# Patient Record
Sex: FEMALE | Race: OTHER | HISPANIC OR LATINO | Employment: FULL TIME | URBAN - METROPOLITAN AREA
[De-identification: names, ages, dates, MRNs, and addresses within clinical notes are randomized per-mention and may not be internally consistent; named-entity substitution may affect disease eponyms.]

---

## 2018-05-02 ENCOUNTER — HOSPITAL ENCOUNTER (EMERGENCY)
Facility: HOSPITAL | Age: 28
Discharge: HOME | End: 2018-05-02
Attending: EMERGENCY MEDICINE | Admitting: EMERGENCY MEDICINE

## 2018-05-02 VITALS
OXYGEN SATURATION: 100 % | TEMPERATURE: 98.5 F | DIASTOLIC BLOOD PRESSURE: 85 MMHG | HEART RATE: 69 BPM | RESPIRATION RATE: 16 BRPM | WEIGHT: 176 LBS | SYSTOLIC BLOOD PRESSURE: 124 MMHG

## 2018-05-02 DIAGNOSIS — R05.9 COUGH: Primary | ICD-10-CM

## 2018-05-02 PROCEDURE — 99283 EMERGENCY DEPT VISIT LOW MDM: CPT

## 2018-05-02 RX ORDER — BENZONATATE 100 MG/1
100 CAPSULE ORAL EVERY 8 HOURS
Qty: 24 CAPSULE | Refills: 0 | Status: SHIPPED | OUTPATIENT
Start: 2018-05-02 | End: 2018-09-22 | Stop reason: ALTCHOICE

## 2018-05-02 RX ORDER — ALBUTEROL SULFATE 90 UG/1
2 AEROSOL, METERED RESPIRATORY (INHALATION) EVERY 6 HOURS PRN
Qty: 8 G | Refills: 0 | Status: SHIPPED | OUTPATIENT
Start: 2018-05-02 | End: 2018-09-22 | Stop reason: ALTCHOICE

## 2018-05-02 NOTE — DISCHARGE INSTRUCTIONS
Tos aguda   LO QUE NECESITA SABER:   Chen Lessen tos aguda puede durar hasta 3 semanas  Las causas comunes de radha tos aguda incluyen un resfriado, alergias o radha infección pulmonar  INSTRUCCIONES SOBRE EL CAMPOS HOSPITALARIA:   Regrese a la mary de emergencias si:   · Tiene dificultad para respirar o le falta el aire  · Usted tose john o nota john en ricketts mucosidad  · Se desmaya, se siente débil o mareado  · Le duele el pecho cuando respira hondo o tose  · Tiene respiración silbante  Pregúntele a ricketts [place]it [place] vitaminas y minerales son adecuados para usted  · Usted tiene fiebre  · La tos dura más de 4 semanas  · Andreina síntomas no mejoran con el tratamiento  · Usted tiene preguntas o inquietudes acerca de ricketts condición o cuidado  Medicamentos:   · Medicamentos,  para detener la tos, disminuir la inflamación de las vías respiratorias o ayudar a abrirlas  Los medicamentos también pueden despejar las vías respiratorias  Pregunte a ricketts médico qué medicamentos de venta hiram puede ayala  Si tiene radha infección causada por bacterias, es posible que necesite antibióticos  · [place] andreina medicamentos olimpia se le haya indicado  Consulte con ricketts médico si usted rl que ricketts medicamento no le está ayudando o si presenta efectos secundarios  Infórmele si es alérgico a cualquier medicamento  Mantenga radha lista actualizada de los [place], las vitaminas y los productos herbales que ana  Incluya los siguientes datos de los medicamentos: cantidad, frecuencia y motivo de administración  Traiga con usted la lista o los envases de la píldoras a andreina citas de seguimiento  Lleve la lista de los medicamentos con usted en he de radha emergencia  El manejo de ricketts síntomas:   · No fume y permanezca lejos de otras personas que fuman  La nicotina y otros químicos contenidos en los cigarrillos y puros pueden causar daño pulmonar y empeorar ricketts tos   Pida información a ricketts médico si usted actualmente fuma y [place] para dejar de fumar  Los cigarrillos electrónicos o tabaco sin humo todavía contienen nicotina  Consulte con smith médico antes de [employer]  · Benjamin líquidos adicionales olimpia se le indique  Los líquidos le ayudarán a aflojar y disolver la mucosidad para que la pueda expectorar  Los líquidos ayudarán a evitar la deshidratación  Los mejores líquidos para ayala son el agua, el jugo y el caldo  No tome líquidos que contienen cafeína  La cafeína puede aumentar el riesgo de deshidratación  Pregúntele a smith médico cuál es la cantidad de líquido que necesita ingerir a diario  · Repose según le indicaron  No realice actividades que empeoren la tos, olimpia el ejercicio físico      · Use un humidificador o vaporizador  Use un humidificador de tk frío o un vaporizador para elevar la humedad en smith casa  [place] podría ayudarle a respirar más fácilmente y al mismo tiempo disminuir la tos  · Ingiera de 2 a 5 ml de [employer]h [employer] al día  La miel puede ayudar a diluir los mocos y [employer] tos  · Utilice gotas o pastillas para la tos  Estas pueden ayudar a disminuir la irritación de la garganta y la tos  Acuda a leoncio consultas de control con smith médico según le indicaron  Anote leoncio preguntas para que se acuerde de hacerlas jeremy leoncio visitas  © 2017 [address] [employer] Information is for End User's use only and may not be sold, redistributed or otherwise used for commercial purposes  All illustrations and images included in CareNotes® are the copyrighted property of A D A M , Inc  or Gonsalo Moraes  Esta información es sólo para uso en educación  Smith intención no es darle un consejo médico sobre enfermedades o tratamientos  Colsulte con smith Jorge So farmacéutico antes de seguir cualquier régimen médico para saber si es seguro y efectivo para usted  DISCHARGE INSTRUCTIONS:    FOLLOW UP WITH YOUR PRIMARY CARE PROVIDER OR THE [address] [place] [address]   MAKE AN APPOINTMENT TO BE SEEN  TAKE TESSALON AS PRESCRIBED FOR COUGH  IF RASH, SHORTNESS OF BREATH OR TROUBLE SWALLOWING, STOP TAKING THE MEDICATION AND BE SEEN  USE ALBUTEROL INHALER AS PRESCRIBED  IF RASH, SHORTNESS OF BREATH OR TROUBLE SWALLOWING, STOP TAKING THE MEDICATION AND BE SEEN  REST AND DRINK PLENTY OF FLUIDS  IF SYMPTOMS WORSEN OR NEW SYMPTOMS ARISE, RETURN TO THE ER TO BE SEEN

## 2018-05-02 NOTE — ED PROVIDER NOTES
History  Chief Complaint   Patient presents with    Asthma     SOB- asthma for 3 days  has no inhaler     27y  o female with PMH of asthma presents to the ER for cough for 3 days  She denies taking any medication for symptoms  She describes her cough as dry  Symptoms are constant  She has positive sick contacts with similar symptoms but denies recent travel  She denies fever, chills, rhinorrhea/congestion, sore throat, chest pain, dyspnea, N/V/D, abdominal pain, weakness or paresthesias  History provided by:  Patient   used: Yes ([employer] [id number])        None       History reviewed  No pertinent past medical history  History reviewed  No pertinent surgical history  History reviewed  No pertinent family history  I have reviewed and agree with the history as documented  Social History   Substance Use Topics    Smoking status: Never Smoker    Smokeless tobacco: Never Used    Alcohol use No        Review of Systems   Constitutional: Negative for activity change, appetite change, chills and fever  HENT: Negative for congestion, drooling, ear discharge, ear pain, facial swelling, rhinorrhea and sore throat  Eyes: Negative for redness  Respiratory: Positive for cough  Negative for chest tightness and shortness of breath  Cardiovascular: Negative for chest pain  Gastrointestinal: Negative for abdominal pain, diarrhea, nausea and vomiting  Musculoskeletal: Negative for neck stiffness  Skin: Negative for rash  Allergic/Immunologic: Negative for food allergies  Neurological: Negative for weakness and numbness         Physical Exam  ED Triage Vitals [05/02/18 1232]   Temperature Pulse Respirations Blood Pressure SpO2   98 5 °F (36 9 °C) 69 16 124/85 100 %      Temp Source Heart Rate Source Patient Position - Orthostatic VS BP Location FiO2 (%)   Temporal -- -- -- --      Pain Score       No Pain           Orthostatic Vital Signs  Vitals:    05/02/18 1232   BP: 124/85 Pulse: 69       Physical Exam   Constitutional:  Non-toxic appearance  No distress  HENT:   Head: Normocephalic and atraumatic  Right Ear: Tympanic membrane, external ear and ear canal normal  No drainage, swelling or tenderness  No foreign bodies  Tympanic membrane is not erythematous  No hemotympanum  Left Ear: Tympanic membrane, external ear and ear canal normal  No drainage, swelling or tenderness  No foreign bodies  Tympanic membrane is not erythematous  No hemotympanum  Nose: Nose normal    Mouth/Throat: Uvula is midline, oropharynx is clear and moist and mucous membranes are normal  No uvula swelling  No posterior oropharyngeal edema, posterior oropharyngeal erythema or tonsillar abscesses  No tonsillar exudate  Neck: Normal range of motion and phonation normal  Neck supple  No tracheal deviation present  Cardiovascular: Normal rate, regular rhythm, S1 normal, S2 normal and normal heart sounds  Exam reveals no gallop and no friction rub  No murmur heard  Pulmonary/Chest: Effort normal and breath sounds normal  No respiratory distress  She has no decreased breath sounds  She has no wheezes  She has no rhonchi  She has no rales  She exhibits no tenderness  Neurological: She is alert  GCS eye subscore is 4  GCS verbal subscore is 5  GCS motor subscore is 6  Skin: Skin is warm and dry  No rash noted  Psychiatric: She has a normal mood and affect  Nursing note and vitals reviewed  ED Medications  Medications - No data to display    Diagnostic Studies  Results Reviewed     None                 No orders to display              Procedures  Procedures       Phone Contacts  ED Phone Contact    ED Course                               MDM  Number of Diagnoses or Management Options  Cough: new and does not require workup  Diagnosis management comments: DDX consists of but not limited to: viral syndrome, asthma, pneumonia    Without fever and normal lung sounds, will hold off on CXR       At discharge, I instructed the patient to:  -follow up with pcp  -take Tessalon as prescribed for cough  -use Albuterol inhaler as prescribed  -rest and drink plenty of fluids  -return to the ER if symptoms worsened or new symptoms arose  Patient agreed to this plan and was stable at time of discharge  Patient Progress  Patient progress: stable    CritCare Time    Disposition  Final diagnoses:   Cough     Time reflects when diagnosis was documented in both MDM as applicable and the Disposition within this note     Time User Action Codes Description Comment    5/2/2018 12:48 PM Erna Sheridan [[id number]] Cough       ED Disposition     ED Disposition Condition Comment    Discharge  Jhon Chavez discharge to home/self care  Condition at discharge: Stable        Follow-up Information     Follow up With Specialties Details Why [facility] Medicine Schedule an appointment as soon as possible for a visit As needed [address] [place] [address]  [id number] [phone]        Patient's Medications   Discharge Prescriptions    ALBUTEROL (PROVENTIL HFA,VENTOLIN HFA) 90 MCG/ACT INHALER    Inhale 2 puffs every 6 (six) hours as needed for wheezing or shortness of breath       Start Date: 5/2/2018  End Date: --       Order Dose: 2 puffs       Quantity: 8 g    Refills: 0    BENZONATATE (TESSALON PERLES) 100 MG CAPSULE    Take 1 capsule (100 mg total) by mouth every 8 (eight) hours       Start Date: 5/2/2018  End Date: --       Order Dose: 100 mg       Quantity: 24 capsule    Refills: 0     No discharge procedures on file      ED Provider  Electronically Signed by           Julia Wilburn PA-C  05/02/18 4438

## 2018-09-21 ENCOUNTER — HOSPITAL ENCOUNTER (EMERGENCY)
Facility: HOSPITAL | Age: 28
Discharge: HOME | End: 2018-09-22
Admitting: EMERGENCY MEDICINE
Payer: OTHER MISCELLANEOUS

## 2018-09-21 VITALS
RESPIRATION RATE: 16 BRPM | OXYGEN SATURATION: 100 % | DIASTOLIC BLOOD PRESSURE: 85 MMHG | HEART RATE: 77 BPM | SYSTOLIC BLOOD PRESSURE: 132 MMHG | TEMPERATURE: 98.2 F

## 2018-09-21 DIAGNOSIS — S82.402A LEFT FIBULAR FRACTURE: Primary | ICD-10-CM

## 2018-09-21 PROCEDURE — 99283 EMERGENCY DEPT VISIT LOW MDM: CPT

## 2018-09-21 RX ORDER — IBUPROFEN 600 MG/1
600 TABLET ORAL ONCE
Status: COMPLETED | OUTPATIENT
Start: 2018-09-22 | End: 2018-09-22

## 2018-09-22 ENCOUNTER — APPOINTMENT (EMERGENCY)
Dept: RADIOLOGY | Facility: HOSPITAL | Age: 28
End: 2018-09-22
Payer: OTHER MISCELLANEOUS

## 2018-09-22 PROCEDURE — 73610 X-RAY EXAM OF ANKLE: CPT

## 2018-09-22 RX ORDER — OXYCODONE HYDROCHLORIDE AND ACETAMINOPHEN 5; 325 MG/1; MG/1
1 TABLET ORAL EVERY 4 HOURS PRN
Qty: 10 TABLET | Refills: 0 | Status: SHIPPED | OUTPATIENT
Start: 2018-09-22

## 2018-09-22 RX ADMIN — IBUPROFEN 600 MG: 600 TABLET, FILM COATED ORAL at 00:00

## 2018-09-22 NOTE — ED PROVIDER NOTES
History  Chief Complaint   Patient presents with    Ankle Pain     Pt reports " I feel at work " c/o left ankle pain, ankle noted to be swollen  Pt denies hitting head and denies LOC      28y  o female with no significant PMH presents to the ER for left ankle pain for 2 hours after falling at work  She denies taking any medication for pain  She describes her pain as sharp and non-radiating  She rates her pain 10/10 and states it is constant  She denies hitting her head or LOC  She denies fever, chills, chest pain, dyspnea, N/V/D, abdominal pain, weakness or paresthesias  History provided by:  Patient   used: No        None       History reviewed  No pertinent past medical history  History reviewed  No pertinent surgical history  History reviewed  No pertinent family history  I have reviewed and agree with the history as documented  Social History   Substance Use Topics    Smoking status: Never Smoker    Smokeless tobacco: Never Used    Alcohol use No        Review of Systems   Constitutional: Negative for chills and fever  Eyes: Negative for redness  Respiratory: Negative for shortness of breath  Cardiovascular: Negative for chest pain  Gastrointestinal: Negative for abdominal pain, diarrhea, nausea and vomiting  Musculoskeletal: Positive for joint swelling (left ankle)  Negative for neck stiffness  Skin: Negative for rash  Allergic/Immunologic: Negative for food allergies  Neurological: Negative for weakness and numbness  Physical Exam  Physical Exam   Constitutional:  Non-toxic appearance  No distress  HENT:   Head: Normocephalic and atraumatic  Right Ear: Tympanic membrane, external ear and ear canal normal  No drainage, swelling or tenderness  No foreign bodies  Tympanic membrane is not erythematous  No hemotympanum  Left Ear: Tympanic membrane, external ear and ear canal normal  No drainage, swelling or tenderness  No foreign bodies  Tympanic membrane is not erythematous  No hemotympanum  Nose: Nose normal    Mouth/Throat: Uvula is midline, oropharynx is clear and moist and mucous membranes are normal  No uvula swelling  No posterior oropharyngeal edema, posterior oropharyngeal erythema or tonsillar abscesses  No tonsillar exudate  Neck: Normal range of motion and phonation normal  Neck supple  No tracheal deviation present  Cardiovascular: Normal rate, regular rhythm, S1 normal, S2 normal and normal heart sounds  Exam reveals no gallop and no friction rub  No murmur heard  Pulmonary/Chest: Effort normal and breath sounds normal  No respiratory distress  She has no decreased breath sounds  She has no wheezes  She has no rhonchi  She has no rales  She exhibits no tenderness  Musculoskeletal:        Left knee: Normal         Left ankle: She exhibits decreased range of motion and swelling  She exhibits no ecchymosis, no deformity, no laceration and normal pulse  Tenderness  Lateral malleolus tenderness found  Left lower leg: Normal         Left foot: Normal         Feet:    Neurological: She is alert  GCS eye subscore is 4  GCS verbal subscore is 5  GCS motor subscore is 6  Skin: Skin is warm and dry  No rash noted  Psychiatric: She has a normal mood and affect  Nursing note and vitals reviewed        Vital Signs  ED Triage Vitals [09/21/18 2354]   Temperature Pulse Respirations Blood Pressure SpO2   98 2 °F (36 8 °C) 77 16 132/85 100 %      Temp Source Heart Rate Source Patient Position - Orthostatic VS BP Location FiO2 (%)   Temporal Monitor Sitting Right arm --      Pain Score       Worst Possible Pain           Vitals:    09/21/18 2354   BP: 132/85   Pulse: 77   Patient Position - Orthostatic VS: Sitting       Visual Acuity      ED Medications  Medications   ibuprofen (MOTRIN) tablet 600 mg (600 mg Oral Given 9/22/18 0000)       Diagnostic Studies  Results Reviewed     None                 XR ankle 3+ views LEFT   ED Interpretation by Feliciano Fish PA-C (09/22 6579)   Distal fibular fracture seen by me at this time  Procedures  Orthopedic Injury  Date/Time: 9/22/2018 12:59 AM  Performed by: Viki Lua by: Fausto Matt     Patient Location:  ED  Other Assisting Provider: Yes (comment) (ED technician)    Verbal consent obtained?: Yes    Consent given by:  Patient  Patient states understanding of procedure being performed: Yes    Radiology Images displayed and confirmed  If images not available, report reviewed: Yes    Patient identity confirmed:  Arm band  Injury location:  Ankle  Location details:  Left ankle  Injury type:  Fracture  Fracture type: lateral malleolus    Fracture type: lateral malleolus    Neurovascular status: Neurovascularly intact    Distal perfusion: normal    Neurological function: normal    Range of motion: reduced    Local anesthesia used?: No    General anesthesia used?: No    Manipulation performed?: No    Immobilization:  Splint and crutches  Splint type:  Short leg  Supplies used:  Cotton padding, elastic bandage and Ortho-Glass  Neurovascular status: Neurovascularly intact    Distal perfusion: normal    Neurological function: normal    Range of motion: unchanged    Patient tolerance:  Patient tolerated the procedure well with no immediate complications           Phone Contacts  ED Phone Contact    ED Course                               MDM  Number of Diagnoses or Management Options  Left fibular fracture: new and requires workup  Diagnosis management comments: DDX consists of but not limited to: fracture, contusion, dislocation, sprain    Will xray the ankle  Fracture of the distal fibula seen by me at this time  Will place in posterior short leg splint      At discharge, I instructed the patient to:  -follow up with pcp  -take Tylenol or Motrin for mild pain  -take Percocet as prescribed for moderate pain  -follow up with the recommended orthopedic specialist  -rest, ice and elevate the ankle  -wear the splint until seen by orthopedics  -return to the ER if symptoms worsened or new symptoms arose  Patient agreed to this plan and was stable at time of discharge  Amount and/or Complexity of Data Reviewed  Tests in the radiology section of CPT®: ordered and reviewed  Independent visualization of images, tracings, or specimens: yes    Patient Progress  Patient progress: stable    CritCare Time    Disposition  Final diagnoses:   Left fibular fracture     Time reflects when diagnosis was documented in both MDM as applicable and the Disposition within this note     Time User Action Codes Description Comment    9/22/2018 12:54 AM Daija RICARDO Add [[id number]] Left fibular fracture       ED Disposition     ED Disposition Condition Comment    Discharge  Satira Lobe discharge to home/self care  Condition at discharge: Stable        Follow-up Information     Follow up With Specialties Details Why [address] [facility] Schedule an appointment as soon as possible for a visit in 1 day left fibula fracture [address] [facility] [place] [address] [id number] [phone]          Patient's Medications   Discharge Prescriptions    OXYCODONE-ACETAMINOPHEN (PERCOCET) 5-325 MG PER TABLET    Take 1 tablet by mouth every 4 (four) hours as needed for moderate pain Max Daily Amount: 6 tablets       Start Date: 9/22/2018 End Date: --       Order Dose: 1 tablet       Quantity: 10 tablet    Refills: 0     No discharge procedures on file      ED Provider  Electronically Signed by           Edmond Marcum PA-C  09/22/18 0110

## 2018-09-22 NOTE — DISCHARGE INSTRUCTIONS
Fractura de pierna   LO QUE NECESITA SABER:   Radha fractura de pierna es radha fractura en cualquiera de los 3 huesos largos de la pierna  El fémur es el hueso más teddy y va de ricketts cadera a la rodilla  Luly Mote y tibia son los 2 huesos en la parte inferior de ricketts pierna que Vika misti de ricketts rodilla al tobillo  INSTRUCCIONES SOBRE EL CAMPOS HOSPITALARIA:   Regrese a la mary de emergencias si:   · Ricketts pierna se siente cálida, sensible y adolorida  Se podría natasha inflamado y escobedo  · El dolor que siente en ricketts pierna lesionada empeora aún después de descansar y ayala medicamentos  · El yeso se moja o se daña  · El pie o los dedos del pie están entumecidos  · La piel en los dedos de la pierna lesionada se enrojece, está fría, o [place]  Comuníquese con ricketts médico o ricketts especialista en huesos si:   · Usted tiene fiebre  · El yeso o la abrazadera están muy apretados  · Hay manchas nuevas de john o un mal olor que sale de por debajo del yeso  · Usted tiene dificultad para  ricketts pierna o la dificultad que ya tenía empeora  · Usted tiene preguntas o inquietudes acerca de ricketts condición o cuidado  Medicamentos:   · Un medicamento con receta para el dolor  podrían ser Sherle Hedge  Pregunte cómo ayala estos medicamentos de radha forma vaughan  · AINEs (Analgésicos antiinflamatorios no esteroides) olimpia el ibuprofeno, ayudan a disminuir la inflamación, el dolor y la [place]  Los AINEs pueden causar sangrado estomacal o problemas renales en ciertas personas  Si usted ana un medicamento anticoagulante, siempre pregúntele a ricketts médico si los GATITO son seguros para usted  Siempre fior la etiqueta de lilly medicamento y [employer] instrucciones  · Acetaminofeno:  patsy el dolor y baja la fiebre  Está disponible sin receta médica  Pregunte la cantidad y la frecuencia con que debe tomarlos  [facility]   Fior las etiquetas de todos los demás medicamentos que esté usando para saber si también contienen acetaminofén, o pregunte a ricketts médico o farmacéutico  El acetaminofén puede causar daño en el hígado cuando no se ana de forma correcta  No use más de 4 gramos (4000 miligramos) en total de acetaminofeno en un día  · [place] leoncio medicamentos olimpia se le haya indicado  Consulte con ricketts médico si usted rl que ricketts medicamento no le está ayudando o si presenta efectos secundarios  Infórmele si es alérgico a algún medicamento  Mantenga radha lista actualizada de los [place], las vitaminas y los productos herbales que ana  Incluya los siguientes datos de los medicamentos: cantidad, frecuencia y motivo de administración  Traiga con usted la lista o los envases de la píldoras a leoncio citas de seguimiento  Lleve la lista de los medicamentos con usted en he de radha emergencia  Cuidado del yeso o la férula:   · Fíjese todos los días si la piel alrededor del yeso o la férula está enrojecida o [place]  · No use objetos afilados o punzantes para rascarse la piel debajo del yeso o la férula  · No se quite la férula a menos que ricketts médico lo autorice  Bañarse con un yeso o radha férula:  No permita que el yeso o férula se moje  Antes de bañarse, cubra el yeso o la férula con radha bolsa plástica  Pegue la bolsa a ricketts piel encima de la férula o yeso para sellar el agua  Mantenga la pierna fuera del agua en he de fugas en la bolsa  Pregunte cuándo puede ayala un baño o [place]  Cuidados personales:   · Descanse  la pierna olimpia se le indique y evite actividades que causen dolor en las piernas  · Aplique hielo  en la pierna de 15 a 20 minutos cada hora o olimpia se le indique  Use un paquete de hielo o ponga hielo molido dentro de [employer]  Cúbrala con radha toalla  El hielo ayuda a evitar daño al tejido y a disminuir la inflamación y el dolor  · Eleve  ricketts pierna por encima del nivel de ricketts corazón con la mayor frecuencia posible  [place] va a disminuir inflamación y el dolor   Coloque ricketts pierna sobre almohadas o cobijas para mantenerla elevada cómodamente  · Use muletas o un andador  según las indicaciones  Las [employer] ayudarán a caminar y quitar un poco de peso de la pierna lesionada hasta que sane  · Fisioterapia  podría recomendarse  Un fisioterapeuta le puede enseñar ejercicios para ayudarle a mejorar el movimiento y la fuerza, y para disminuir el dolor  Acuda a leoncio consultas de control con smith médico o especialista en huesos según le indicaron:  Es posible que deba regresar para que le quiten la férula o el yeso  Es posible que necesite radha radiografía de la pierna para comprobar qué tan berna el hueso bull sanado  Anote leoncio preguntas para que se acuerde de hacerlas jeremy leoncio visitas  © 2017 [facility] [facility] Information is for End User's use only and may not be sold, redistributed or otherwise used for commercial purposes  All illustrations and images included in CareNotes® are the copyrighted property of A D A M , Inc  or Gonsalo Moraes  Esta información es sólo para uso en educación  Smith intención no es darle un consejo médico sobre enfermedades o tratamientos  Colsulte con smith [place]ith [place] farmacéutico antes de seguir cualquier régimen médico para saber si es seguro y efectivo para usted  DISCHARGE INSTRUCTIONS:    FOLLOW UP WITH YOUR PRIMARY CARE PROVIDER OR THE [address] [place] [address]  MAKE AN APPOINTMENT TO BE SEEN  TAKE TYLENOL OR MOTRIN FOR MILD PAIN  TAKE PERCOCET AS PRESCRIBED FOR MODERATE PAIN  IF RASH, SHORTNESS OF BREATH OR TROUBLE SWALLOWING, STOP TAKING THE MEDICATION AND BE SEEN  NO DRINKING, DRIVING OR OPERATING HEAVY MACHINERY WHILE TAKING THIS MEDICATION  FOLLOW UP WITH THE RECOMMENDED ORTHOPEDIC SPECIALIST  MAKE AN APPOINTMENT TO BE SEEN  REST, ICE AND ELEVATE THE AREA  WEAR THE SPLINT UNTIL SEEN BY THE RECOMMENDED ORTHOPEDIC SPECIALIST  DO NOT GET THE SPLINT WET  DO NOT TAKE THE SPLINT OFF      IF SYMPTOMS WORSEN OR NEW SYMPTOMS ARISE, RETURN TO THE ER TO BE SEEN

## 2020-05-15 ENCOUNTER — APPOINTMENT (EMERGENCY)
Dept: RADIOLOGY | Facility: HOSPITAL | Age: 30
End: 2020-05-15
Payer: COMMERCIAL

## 2020-05-15 ENCOUNTER — HOSPITAL ENCOUNTER (EMERGENCY)
Facility: HOSPITAL | Age: 30
Discharge: HOME | End: 2020-05-15
Attending: EMERGENCY MEDICINE | Admitting: EMERGENCY MEDICINE
Payer: COMMERCIAL

## 2020-05-15 VITALS
WEIGHT: 199.3 LBS | TEMPERATURE: 97.7 F | DIASTOLIC BLOOD PRESSURE: 94 MMHG | SYSTOLIC BLOOD PRESSURE: 143 MMHG | HEART RATE: 73 BPM | OXYGEN SATURATION: 99 % | RESPIRATION RATE: 16 BRPM

## 2020-05-15 DIAGNOSIS — R07.81 RIB PAIN ON RIGHT SIDE: ICD-10-CM

## 2020-05-15 DIAGNOSIS — S52.502A DISTAL RADIUS FRACTURE, LEFT: ICD-10-CM
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
## 2020-05-15 PROCEDURE — 73110 X-RAY EXAM OF WRIST: CPT

## 2020-05-15 PROCEDURE — 99284 EMERGENCY DEPT VISIT MOD MDM: CPT | Performed by: EMERGENCY MEDICINE

## 2020-05-15 PROCEDURE — 71101 X-RAY EXAM UNILAT RIBS/CHEST: CPT

## 2020-05-15 PROCEDURE — 99284 EMERGENCY DEPT VISIT MOD MDM: CPT

## 2020-05-15 RX ORDER — ACETAMINOPHEN 325 MG/1
325 TABLET ORAL EVERY 6 HOURS PRN
Qty: 30 TABLET | Refills: 0 | Status: SHIPPED | OUTPATIENT
Start: 2020-05-15

## 2020-05-15 RX ORDER — OXYCODONE HYDROCHLORIDE AND ACETAMINOPHEN 5; 325 MG/1; MG/1
1 TABLET ORAL EVERY 4 HOURS PRN
Qty: 12 TABLET | Refills: 0 | Status: SHIPPED | OUTPATIENT
Start: 2020-05-15 | End: 2020-05-25

## 2020-05-15 RX ORDER — OXYCODONE HYDROCHLORIDE AND ACETAMINOPHEN 5; 325 MG/1; MG/1
1 TABLET ORAL ONCE
Status: COMPLETED | OUTPATIENT
Start: 2020-05-15 | End: 2020-05-15

## 2020-05-15 RX ORDER — IBUPROFEN 600 MG/1
600 TABLET ORAL EVERY 6 HOURS PRN
Qty: 30 TABLET | Refills: 0 | Status: SHIPPED | OUTPATIENT
Start: 2020-05-15 | End: 2020-07-14 | Stop reason: SDUPTHER

## 2020-05-15 RX ADMIN — OXYCODONE HYDROCHLORIDE AND ACETAMINOPHEN 1 TABLET: 5; 325 TABLET ORAL at 19:41

## 2020-05-18 ENCOUNTER — APPOINTMENT (OUTPATIENT)
Dept: RADIOLOGY | Facility: MEDICAL CENTER | Age: 30
End: 2020-05-18

## 2020-05-18 ENCOUNTER — OFFICE VISIT (OUTPATIENT)
Dept: OBGYN CLINIC | Facility: MEDICAL CENTER | Age: 30
End: 2020-05-18

## 2020-05-18 DIAGNOSIS — S52.602A CLOSED FRACTURE OF LEFT DISTAL RADIUS AND ULNA, INITIAL ENCOUNTER: ICD-10-CM

## 2020-05-18 DIAGNOSIS — S52.502A CLOSED FRACTURE OF LEFT DISTAL RADIUS AND ULNA, INITIAL ENCOUNTER: ICD-10-CM

## 2020-05-18 DIAGNOSIS — S52.602A CLOSED FRACTURE OF DISTAL ENDS OF LEFT RADIUS AND ULNA, INITIAL ENCOUNTER: Primary | ICD-10-CM

## 2020-05-18 DIAGNOSIS — S52.502A CLOSED FRACTURE OF DISTAL ENDS OF LEFT RADIUS AND ULNA, INITIAL ENCOUNTER: Primary | ICD-10-CM

## 2020-05-18 PROCEDURE — 25600 CLTX DST RDL FX/EPHYS SEP WO: CPT | Performed by: ORTHOPAEDIC SURGERY

## 2020-05-18 PROCEDURE — 99204 OFFICE O/P NEW MOD 45 MIN: CPT | Performed by: ORTHOPAEDIC SURGERY

## 2020-05-18 PROCEDURE — 73110 X-RAY EXAM OF WRIST: CPT

## 2020-05-26 ENCOUNTER — TELEPHONE (OUTPATIENT)
Dept: OBGYN CLINIC | Facility: HOSPITAL | Age: 30
End: 2020-05-26

## 2020-05-28 ENCOUNTER — APPOINTMENT (OUTPATIENT)
Dept: RADIOLOGY | Facility: MEDICAL CENTER | Age: 30
End: 2020-05-28

## 2020-05-28 ENCOUNTER — OFFICE VISIT (OUTPATIENT)
Dept: OBGYN CLINIC | Facility: MEDICAL CENTER | Age: 30
End: 2020-05-28

## 2020-05-28 VITALS
HEART RATE: 76 BPM | HEIGHT: 62 IN | SYSTOLIC BLOOD PRESSURE: 131 MMHG | DIASTOLIC BLOOD PRESSURE: 83 MMHG | BODY MASS INDEX: 36.45 KG/M2

## 2020-05-28 DIAGNOSIS — S52.602D CLOSED FRACTURE OF DISTAL ENDS OF LEFT RADIUS AND ULNA WITH ROUTINE HEALING, SUBSEQUENT ENCOUNTER: ICD-10-CM

## 2020-05-28 DIAGNOSIS — S52.502D CLOSED FRACTURE OF DISTAL ENDS OF LEFT RADIUS AND ULNA WITH ROUTINE HEALING, SUBSEQUENT ENCOUNTER: Primary | ICD-10-CM

## 2020-05-28 DIAGNOSIS — S52.502D CLOSED FRACTURE OF DISTAL ENDS OF LEFT RADIUS AND ULNA WITH ROUTINE HEALING, SUBSEQUENT ENCOUNTER: ICD-10-CM

## 2020-05-28 DIAGNOSIS — S52.602D CLOSED FRACTURE OF DISTAL ENDS OF LEFT RADIUS AND ULNA WITH ROUTINE HEALING, SUBSEQUENT ENCOUNTER: Primary | ICD-10-CM

## 2020-05-28 PROCEDURE — 73110 X-RAY EXAM OF WRIST: CPT

## 2020-05-28 PROCEDURE — 99024 POSTOP FOLLOW-UP VISIT: CPT | Performed by: ORTHOPAEDIC SURGERY

## 2020-06-03 ENCOUNTER — TELEPHONE (OUTPATIENT)
Dept: OBGYN CLINIC | Facility: MEDICAL CENTER | Age: 30
End: 2020-06-03

## 2020-06-04 ENCOUNTER — APPOINTMENT (OUTPATIENT)
Dept: RADIOLOGY | Facility: MEDICAL CENTER | Age: 30
End: 2020-06-04

## 2020-06-04 ENCOUNTER — OFFICE VISIT (OUTPATIENT)
Dept: OBGYN CLINIC | Facility: MEDICAL CENTER | Age: 30
End: 2020-06-04

## 2020-06-04 VITALS
SYSTOLIC BLOOD PRESSURE: 133 MMHG | WEIGHT: 199 LBS | DIASTOLIC BLOOD PRESSURE: 88 MMHG | HEART RATE: 64 BPM | BODY MASS INDEX: 36.4 KG/M2

## 2020-06-04 DIAGNOSIS — S52.502D CLOSED FRACTURE OF DISTAL ENDS OF LEFT RADIUS AND ULNA WITH ROUTINE HEALING, SUBSEQUENT ENCOUNTER: ICD-10-CM

## 2020-06-04 DIAGNOSIS — S52.502D CLOSED FRACTURE OF DISTAL ENDS OF LEFT RADIUS AND ULNA WITH ROUTINE HEALING, SUBSEQUENT ENCOUNTER: Primary | ICD-10-CM

## 2020-06-04 DIAGNOSIS — S52.602D CLOSED FRACTURE OF DISTAL ENDS OF LEFT RADIUS AND ULNA WITH ROUTINE HEALING, SUBSEQUENT ENCOUNTER: Primary | ICD-10-CM

## 2020-06-04 DIAGNOSIS — S52.602D CLOSED FRACTURE OF DISTAL ENDS OF LEFT RADIUS AND ULNA WITH ROUTINE HEALING, SUBSEQUENT ENCOUNTER: ICD-10-CM

## 2020-06-04 PROCEDURE — 99024 POSTOP FOLLOW-UP VISIT: CPT | Performed by: ORTHOPAEDIC SURGERY

## 2020-06-04 PROCEDURE — 73110 X-RAY EXAM OF WRIST: CPT

## 2020-06-11 ENCOUNTER — OFFICE VISIT (OUTPATIENT)
Dept: OBGYN CLINIC | Facility: MEDICAL CENTER | Age: 30
End: 2020-06-11

## 2020-06-11 ENCOUNTER — APPOINTMENT (OUTPATIENT)
Dept: RADIOLOGY | Facility: MEDICAL CENTER | Age: 30
End: 2020-06-11

## 2020-06-11 VITALS
TEMPERATURE: 98.3 F | HEART RATE: 72 BPM | HEIGHT: 61 IN | SYSTOLIC BLOOD PRESSURE: 121 MMHG | BODY MASS INDEX: 37.57 KG/M2 | WEIGHT: 199 LBS | DIASTOLIC BLOOD PRESSURE: 75 MMHG

## 2020-06-11 DIAGNOSIS — S52.602D CLOSED FRACTURE OF DISTAL ENDS OF LEFT RADIUS AND ULNA WITH ROUTINE HEALING, SUBSEQUENT ENCOUNTER: ICD-10-CM

## 2020-06-11 DIAGNOSIS — S52.602D CLOSED FRACTURE OF DISTAL ENDS OF LEFT RADIUS AND ULNA WITH ROUTINE HEALING, SUBSEQUENT ENCOUNTER: Primary | ICD-10-CM

## 2020-06-11 DIAGNOSIS — S52.502D CLOSED FRACTURE OF DISTAL ENDS OF LEFT RADIUS AND ULNA WITH ROUTINE HEALING, SUBSEQUENT ENCOUNTER: Primary | ICD-10-CM

## 2020-06-11 DIAGNOSIS — S52.502D CLOSED FRACTURE OF DISTAL ENDS OF LEFT RADIUS AND ULNA WITH ROUTINE HEALING, SUBSEQUENT ENCOUNTER: ICD-10-CM

## 2020-06-11 PROCEDURE — 99024 POSTOP FOLLOW-UP VISIT: CPT | Performed by: ORTHOPAEDIC SURGERY

## 2020-06-11 PROCEDURE — 73110 X-RAY EXAM OF WRIST: CPT

## 2020-06-28 ENCOUNTER — TELEPHONE (OUTPATIENT)
Dept: OTHER | Facility: OTHER | Age: 30
End: 2020-06-28

## 2020-07-02 ENCOUNTER — OFFICE VISIT (OUTPATIENT)
Dept: OBGYN CLINIC | Facility: MEDICAL CENTER | Age: 30
End: 2020-07-02

## 2020-07-02 ENCOUNTER — APPOINTMENT (OUTPATIENT)
Dept: RADIOLOGY | Facility: MEDICAL CENTER | Age: 30
End: 2020-07-02

## 2020-07-02 VITALS
HEIGHT: 62 IN | DIASTOLIC BLOOD PRESSURE: 74 MMHG | SYSTOLIC BLOOD PRESSURE: 126 MMHG | HEART RATE: 68 BPM | TEMPERATURE: 98.3 F | BODY MASS INDEX: 36.62 KG/M2 | WEIGHT: 199 LBS

## 2020-07-02 DIAGNOSIS — S52.602D CLOSED FRACTURE OF DISTAL ENDS OF LEFT RADIUS AND ULNA WITH ROUTINE HEALING, SUBSEQUENT ENCOUNTER: Primary | ICD-10-CM

## 2020-07-02 DIAGNOSIS — S52.502A DISTAL RADIUS FRACTURE, LEFT: ICD-10-CM

## 2020-07-02 DIAGNOSIS — S52.502D CLOSED FRACTURE OF DISTAL ENDS OF LEFT RADIUS AND ULNA WITH ROUTINE HEALING, SUBSEQUENT ENCOUNTER: ICD-10-CM

## 2020-07-02 DIAGNOSIS — S52.502D CLOSED FRACTURE OF DISTAL ENDS OF LEFT RADIUS AND ULNA WITH ROUTINE HEALING, SUBSEQUENT ENCOUNTER: Primary | ICD-10-CM

## 2020-07-02 DIAGNOSIS — S52.602D CLOSED FRACTURE OF DISTAL ENDS OF LEFT RADIUS AND ULNA WITH ROUTINE HEALING, SUBSEQUENT ENCOUNTER: ICD-10-CM

## 2020-07-02 PROCEDURE — 73110 X-RAY EXAM OF WRIST: CPT

## 2020-07-02 PROCEDURE — 99024 POSTOP FOLLOW-UP VISIT: CPT | Performed by: ORTHOPAEDIC SURGERY
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
## 2020-07-14 ENCOUNTER — TELEPHONE (OUTPATIENT)
Dept: PAIN MEDICINE | Facility: MEDICAL CENTER | Age: 30
End: 2020-07-14

## 2020-07-14 RX ORDER — IBUPROFEN 600 MG/1
600 TABLET ORAL EVERY 6 HOURS PRN
Qty: 30 TABLET | Refills: 0 | Status: SHIPPED | OUTPATIENT
Start: 2020-07-14

## 2020-07-14 NOTE — TELEPHONE ENCOUNTER
Patient's job is now requesting a note that patient is able to go back to work and continue normal work activities  Patient also went to the pharmacy to  medication and pharmacist states no scripts sent from Orth[facility]  Please advise

## 2020-07-15 NOTE — TELEPHONE ENCOUNTER
Patient states she does not need medication, she was just wondering if you wanted her to still taking it    I advised her to take it only if needed and can get it over the counter

## 2020-07-15 NOTE — TELEPHONE ENCOUNTER
I do not remember discussing medicine with her, but I called in some ibuprofen in case this is what she is hoping for

## 2022-07-05 ENCOUNTER — APPOINTMENT (EMERGENCY)
Dept: RADIOLOGY | Facility: HOSPITAL | Age: 32
End: 2022-07-05
Payer: COMMERCIAL

## 2022-07-05 ENCOUNTER — HOSPITAL ENCOUNTER (EMERGENCY)
Facility: HOSPITAL | Age: 32
Discharge: HOME | End: 2022-07-05
Attending: EMERGENCY MEDICINE | Admitting: EMERGENCY MEDICINE
Payer: COMMERCIAL

## 2022-07-05 VITALS
RESPIRATION RATE: 20 BRPM | TEMPERATURE: 98.2 F | DIASTOLIC BLOOD PRESSURE: 86 MMHG | OXYGEN SATURATION: 98 % | SYSTOLIC BLOOD PRESSURE: 128 MMHG | WEIGHT: 186.8 LBS | HEART RATE: 73 BPM | BODY MASS INDEX: 34.17 KG/M2

## 2022-07-05 DIAGNOSIS — S83.91XA RIGHT KNEE SPRAIN: Primary | ICD-10-CM

## 2022-07-05 PROCEDURE — 99283 EMERGENCY DEPT VISIT LOW MDM: CPT

## 2022-07-05 PROCEDURE — 99284 EMERGENCY DEPT VISIT MOD MDM: CPT | Performed by: EMERGENCY MEDICINE

## 2022-07-05 PROCEDURE — 73564 X-RAY EXAM KNEE 4 OR MORE: CPT

## 2022-07-05 RX ORDER — IBUPROFEN 600 MG/1
600 TABLET ORAL ONCE
Status: COMPLETED | OUTPATIENT
Start: 2022-07-05 | End: 2022-07-05

## 2022-07-05 RX ORDER — NAPROXEN 375 MG/1
375 TABLET ORAL 2 TIMES DAILY WITH MEALS
Qty: 20 TABLET | Refills: 0 | Status: SHIPPED | OUTPATIENT
Start: 2022-07-05

## 2022-07-05 RX ADMIN — IBUPROFEN 600 MG: 600 TABLET ORAL at 17:59

## 2022-07-05 NOTE — ED PROVIDER NOTES
History  Chief Complaint   Patient presents with    Knee Pain     R knee pain "When I am walking it looks white and it hurts" x2 days tylenol taken 260     78-year-old female presents emergency room with 2 days of right knee pain  Patient notes that hurts when she is walking  Pain is in the lateral knee  No trauma  No fevers chills cough congestion rhinorrhea     Denies swelling      History provided by:  Patient  Knee Pain  Location:  Knee  Time since incident:  2 days  Injury: no    Knee location:  R knee  Pain details:     Quality:  Aching    Radiates to:  Does not radiate    Severity:  Mild    Onset quality:  Gradual    Duration:  2 days    Timing:  Intermittent    Progression:  Unchanged  Chronicity:  New  Prior injury to area:  No  Relieved by:  Nothing  Worsened by:  Nothing  Associated symptoms: no back pain and no fever        Prior to Admission Medications   Prescriptions Last Dose Informant Patient Reported? Taking?   acetaminophen (TYLENOL) 325 mg tablet Not Taking at Unknown time  No No   Sig: Take 1 tablet (325 mg total) by mouth every 6 (six) hours as needed for mild pain   Patient not taking: Reported on 7/5/2022   ibuprofen (MOTRIN) 600 mg tablet Not Taking at Unknown time  No No   Sig: Take 1 tablet (600 mg total) by mouth every 6 (six) hours as needed for mild pain   Patient not taking: Reported on 7/5/2022   oxyCODONE-acetaminophen (PERCOCET) 5-325 mg per tablet Not Taking at Unknown time  No No   Sig: Take 1 tablet by mouth every 4 (four) hours as needed for moderate pain Max Daily Amount: 6 tablets   Patient not taking: Reported on 7/5/2022      Facility-Administered Medications: None       History reviewed  No pertinent past medical history  History reviewed  No pertinent surgical history  History reviewed  No pertinent family history  I have reviewed and agree with the history as documented      E-Cigarette/Vaping    E-Cigarette Use Never User      E-Cigarette/Vaping Substances Social History     Tobacco Use    Smoking status: Never Smoker    Smokeless tobacco: Never Used   Vaping Use    Vaping Use: Never used   Substance Use Topics    Alcohol use: No    Drug use: No       Review of Systems   Constitutional: Negative for chills and fever  HENT: Negative for ear pain and sore throat  Eyes: Negative for pain and visual disturbance  Respiratory: Negative for cough and shortness of breath  Cardiovascular: Negative for chest pain and palpitations  Gastrointestinal: Negative for abdominal pain and vomiting  Genitourinary: Negative for dysuria and hematuria  Musculoskeletal: Negative for arthralgias and back pain  Skin: Negative for color change and rash  Neurological: Negative for seizures and syncope  All other systems reviewed and are negative  Physical Exam  Physical Exam  Vitals and nursing note reviewed  Constitutional:       General: She is not in acute distress  Appearance: She is well-developed  HENT:      Head: Normocephalic and atraumatic  Nose: Nose normal       Mouth/Throat:      Mouth: Mucous membranes are moist    Eyes:      Conjunctiva/sclera: Conjunctivae normal    Cardiovascular:      Rate and Rhythm: Normal rate and regular rhythm  Heart sounds: No murmur heard  Pulmonary:      Effort: Pulmonary effort is normal  No respiratory distress  Breath sounds: Normal breath sounds  Abdominal:      Palpations: Abdomen is soft  Tenderness: There is no abdominal tenderness  Musculoskeletal:      Cervical back: Neck supple  Comments: Right knee with no swelling, no effusion  Mild tenderness to palpation on the lateral knee  No ligamentous laxity  Skin:     General: Skin is warm and dry  Neurological:      Mental Status: She is alert           Vital Signs  ED Triage Vitals   Temperature Pulse Respirations Blood Pressure SpO2   07/05/22 1733 07/05/22 1733 07/05/22 1733 07/05/22 1733 07/05/22 1733   98 2 °F (36 8 °C) 73 20 128/86 98 %      Temp Source Heart Rate Source Patient Position - Orthostatic VS BP Location FiO2 (%)   07/05/22 1733 07/05/22 1733 07/05/22 1733 07/05/22 1733 --   Oral Monitor Sitting Left arm       Pain Score       07/05/22 1759       8           Vitals:    07/05/22 1733   BP: 128/86   Pulse: 73   Patient Position - Orthostatic VS: Sitting         Visual Acuity      ED Medications  Medications   ibuprofen (MOTRIN) tablet 600 mg (600 mg Oral Given 7/5/22 1759)       Diagnostic Studies  Results Reviewed     None                 XR knee 4+ vw right injury    (Results Pending)              Procedures  Procedures         ED Course                               SBIRT 22yo+    Flowsheet Row Most Recent Value   SBIRT (23 yo +)    In order to provide better care to our patients, we are screening all of our patients for alcohol and drug use  Would it be okay to ask you these screening questions? No Filed at: 07/05/2022 1800                    MDM  Number of Diagnoses or Management Options  Right knee sprain  Diagnosis management comments: Likely LCL sprain, will treat with Ace wrap, NSAIDs, orthopedics follow-up if not improved in 1 week  Disposition  Final diagnoses:   Right knee sprain     Time reflects when diagnosis was documented in both MDM as applicable and the Disposition within this note     Time User Action Codes Description Comment    7/5/2022  6:36 PM Lei Vinicius Add [M25 561] Acute pain of right knee     7/5/2022  6:37 PM Lei Vinicius Remove [M25 561] Acute pain of right knee     7/5/2022  6:37 PM Quique Vinicius Add [G61 75YT] Right knee sprain       ED Disposition     ED Disposition   Discharge    Condition   Stable    Date/Time   Tue Jul 5, 2022  6:36 PM    Comment   Jonh Bruno discharge to home/self care                 Follow-up Information     Follow up With Specialties Details Why Contact Info Additional [address] [facility] [place] [facility]   [facility] [place] [id number]  [address] [place] [address], [address], [address] [phone]          Patient's Medications   Discharge Prescriptions    NAPROXEN (NAPROSYN) 375 MG TABLET    Take 1 tablet (375 mg total) by mouth 2 (two) times a day with meals       Start Date: 7/5/2022  End Date: --       Order Dose: 375 mg       Quantity: 20 tablet    Refills: 0       No discharge procedures on file      PDMP Review     None          ED Provider  Electronically Signed by           Darwin Levi MD  07/05/22 5787

## 2022-07-05 NOTE — Clinical Note
Whitney Rayo was seen and treated in our emergency department on 7/5/2022  Diagnosis:     Jhon Macias  may return to work on return date  She may return on this date: 07/07/2022         If you have any questions or concerns, please don't hesitate to call        Sandi Moreland MD    ______________________________           _______________          _______________  Hospital Representative                              Date                                Time

## 2022-08-01 ENCOUNTER — HOSPITAL ENCOUNTER (EMERGENCY)
Facility: HOSPITAL | Age: 32
Discharge: HOME | End: 2022-08-01
Attending: EMERGENCY MEDICINE
Payer: COMMERCIAL

## 2022-08-01 VITALS
BODY MASS INDEX: 33.93 KG/M2 | HEART RATE: 69 BPM | DIASTOLIC BLOOD PRESSURE: 85 MMHG | OXYGEN SATURATION: 100 % | TEMPERATURE: 98.3 F | WEIGHT: 185.5 LBS | SYSTOLIC BLOOD PRESSURE: 134 MMHG | RESPIRATION RATE: 16 BRPM

## 2022-08-01 DIAGNOSIS — K29.70 GASTRITIS: Primary | ICD-10-CM

## 2022-08-01 LAB
2HR DELTA HS TROPONIN: 0 NG/L
ALBUMIN SERPL BCP-MCNC: 4.6 G/DL (ref 3.5–5)
ALP SERPL-CCNC: 61 U/L (ref 43–122)
ALT SERPL W P-5'-P-CCNC: 25 U/L
ANION GAP SERPL CALCULATED.3IONS-SCNC: 8 MMOL/L (ref 5–14)
AST SERPL W P-5'-P-CCNC: 24 U/L (ref 14–36)
BASOPHILS # BLD AUTO: 0.03 THOUSANDS/ΜL (ref 0–0.1)
BASOPHILS NFR BLD AUTO: 1 % (ref 0–1)
BILIRUB SERPL-MCNC: 0.62 MG/DL (ref 0.2–1)
BILIRUB UR QL STRIP: NEGATIVE
BUN SERPL-MCNC: 11 MG/DL (ref 5–25)
CALCIUM SERPL-MCNC: 9.1 MG/DL (ref 8.4–10.2)
CARDIAC TROPONIN I PNL SERPL HS: 11 NG/L
CARDIAC TROPONIN I PNL SERPL HS: 11 NG/L
CHLORIDE SERPL-SCNC: 103 MMOL/L (ref 96–108)
CLARITY UR: ABNORMAL
CO2 SERPL-SCNC: 28 MMOL/L (ref 21–32)
COLOR UR: ABNORMAL
CREAT SERPL-MCNC: 0.76 MG/DL (ref 0.6–1.2)
EOSINOPHIL # BLD AUTO: 0.07 THOUSAND/ΜL (ref 0–0.61)
EOSINOPHIL NFR BLD AUTO: 2 % (ref 0–6)
ERYTHROCYTE [DISTWIDTH] IN BLOOD BY AUTOMATED COUNT: 13 % (ref 11.6–15.1)
EXT PREG TEST URINE: NEGATIVE
EXT. CONTROL ED NAV: NORMAL
GFR SERPL CREATININE-BSD FRML MDRD: 104 ML/MIN/1.73SQ M
GLUCOSE SERPL-MCNC: 81 MG/DL (ref 70–99)
GLUCOSE UR STRIP-MCNC: NEGATIVE MG/DL
HCT VFR BLD AUTO: 42.8 % (ref 34.8–46.1)
HGB BLD-MCNC: 13.9 G/DL (ref 11.5–15.4)
HGB UR QL STRIP.AUTO: NEGATIVE
IMM GRANULOCYTES # BLD AUTO: 0 THOUSAND/UL (ref 0–0.2)
IMM GRANULOCYTES NFR BLD AUTO: 0 % (ref 0–2)
KETONES UR STRIP-MCNC: NEGATIVE MG/DL
LEUKOCYTE ESTERASE UR QL STRIP: NEGATIVE
LIPASE SERPL-CCNC: 73 U/L (ref 23–300)
LYMPHOCYTES # BLD AUTO: 1.42 THOUSANDS/ΜL (ref 0.6–4.47)
LYMPHOCYTES NFR BLD AUTO: 42 % (ref 14–44)
MCH RBC QN AUTO: 31.5 PG (ref 26.8–34.3)
MCHC RBC AUTO-ENTMCNC: 32.5 G/DL (ref 31.4–37.4)
MCV RBC AUTO: 97 FL (ref 82–98)
MONOCYTES # BLD AUTO: 0.33 THOUSAND/ΜL (ref 0.17–1.22)
MONOCYTES NFR BLD AUTO: 10 % (ref 4–12)
NEUTROPHILS # BLD AUTO: 1.57 THOUSANDS/ΜL (ref 1.85–7.62)
NEUTS SEG NFR BLD AUTO: 45 % (ref 43–75)
NITRITE UR QL STRIP: NEGATIVE
NRBC BLD AUTO-RTO: 0 /100 WBCS
PH UR STRIP.AUTO: 5 [PH]
PLATELET # BLD AUTO: 274 THOUSANDS/UL (ref 149–390)
PMV BLD AUTO: 9.6 FL (ref 8.9–12.7)
POTASSIUM SERPL-SCNC: 3.4 MMOL/L (ref 3.5–5.3)
PROT SERPL-MCNC: 8.5 G/DL (ref 6.4–8.4)
PROT UR STRIP-MCNC: NEGATIVE MG/DL
RBC # BLD AUTO: 4.41 MILLION/UL (ref 3.81–5.12)
SODIUM SERPL-SCNC: 139 MMOL/L (ref 135–147)
SP GR UR STRIP.AUTO: 1.02 (ref 1–1.04)
UROBILINOGEN UA: NEGATIVE MG/DL
WBC # BLD AUTO: 3.42 THOUSAND/UL (ref 4.31–10.16)

## 2022-08-01 PROCEDURE — 99284 EMERGENCY DEPT VISIT MOD MDM: CPT

## 2022-08-01 PROCEDURE — 96361 HYDRATE IV INFUSION ADD-ON: CPT

## 2022-08-01 PROCEDURE — 81003 URINALYSIS AUTO W/O SCOPE: CPT | Performed by: PHYSICIAN ASSISTANT

## 2022-08-01 PROCEDURE — 36415 COLL VENOUS BLD VENIPUNCTURE: CPT | Performed by: PHYSICIAN ASSISTANT

## 2022-08-01 PROCEDURE — 84484 ASSAY OF TROPONIN QUANT: CPT | Performed by: PHYSICIAN ASSISTANT

## 2022-08-01 PROCEDURE — 99285 EMERGENCY DEPT VISIT HI MDM: CPT | Performed by: PHYSICIAN ASSISTANT

## 2022-08-01 PROCEDURE — 85025 COMPLETE CBC W/AUTO DIFF WBC: CPT | Performed by: PHYSICIAN ASSISTANT

## 2022-08-01 PROCEDURE — 96360 HYDRATION IV INFUSION INIT: CPT

## 2022-08-01 PROCEDURE — 81025 URINE PREGNANCY TEST: CPT | Performed by: PHYSICIAN ASSISTANT

## 2022-08-01 PROCEDURE — 93005 ELECTROCARDIOGRAM TRACING: CPT

## 2022-08-01 PROCEDURE — 83690 ASSAY OF LIPASE: CPT | Performed by: PHYSICIAN ASSISTANT

## 2022-08-01 PROCEDURE — 80053 COMPREHEN METABOLIC PANEL: CPT | Performed by: PHYSICIAN ASSISTANT

## 2022-08-01 RX ORDER — MAGNESIUM HYDROXIDE/ALUMINUM HYDROXICE/SIMETHICONE 120; 1200; 1200 MG/30ML; MG/30ML; MG/30ML
30 SUSPENSION ORAL ONCE
Status: COMPLETED | OUTPATIENT
Start: 2022-08-01 | End: 2022-08-01

## 2022-08-01 RX ORDER — LIDOCAINE HYDROCHLORIDE 20 MG/ML
15 SOLUTION OROPHARYNGEAL ONCE
Status: COMPLETED | OUTPATIENT
Start: 2022-08-01 | End: 2022-08-01

## 2022-08-01 RX ORDER — PANTOPRAZOLE SODIUM 20 MG/1
20 TABLET, DELAYED RELEASE ORAL DAILY
Qty: 14 TABLET | Refills: 0 | Status: SHIPPED | OUTPATIENT
Start: 2022-08-01

## 2022-08-01 RX ORDER — SUCRALFATE 1 G/1
1 TABLET ORAL 4 TIMES DAILY
Qty: 28 TABLET | Refills: 0 | Status: SHIPPED | OUTPATIENT
Start: 2022-08-01

## 2022-08-01 RX ORDER — FAMOTIDINE 20 MG/1
20 TABLET, FILM COATED ORAL ONCE
Status: COMPLETED | OUTPATIENT
Start: 2022-08-01 | End: 2022-08-01

## 2022-08-01 RX ADMIN — FAMOTIDINE 20 MG: 20 TABLET ORAL at 20:21

## 2022-08-01 RX ADMIN — LIDOCAINE HYDROCHLORIDE 15 ML: 20 SOLUTION ORAL; TOPICAL at 20:21

## 2022-08-01 RX ADMIN — ALUMINUM HYDROXIDE, MAGNESIUM HYDROXIDE, AND SIMETHICONE 30 ML: 200; 200; 20 SUSPENSION ORAL at 20:21

## 2022-08-01 RX ADMIN — SODIUM CHLORIDE 1000 ML: 0.9 INJECTION, SOLUTION INTRAVENOUS at 20:19

## 2022-08-01 NOTE — Clinical Note
Cr Kendall was seen and treated in our emergency department on 8/1/2022  Diagnosis:     Adina    She may return on this date: If you have any questions or concerns, please don't hesitate to call        Neysa Cushing, RN    ______________________________           _______________          _______________  Hospital Representative                              Date                                Time
no

## 2022-08-02 LAB
ATRIAL RATE: 54 BPM
P AXIS: 38 DEGREES
PR INTERVAL: 148 MS
QRS AXIS: 38 DEGREES
QRSD INTERVAL: 76 MS
QT INTERVAL: 438 MS
QTC INTERVAL: 415 MS
T WAVE AXIS: -18 DEGREES
VENTRICULAR RATE: 54 BPM

## 2022-08-02 PROCEDURE — 93010 ELECTROCARDIOGRAM REPORT: CPT

## 2022-08-02 NOTE — ED PROVIDER NOTES
History  Chief Complaint   Patient presents with    Abdominal Pain     Mid epigastric since this AM      Patient presents for an evaluation of epigastric pain ongoing since this morning  Denies any vomiting but reports nausea  No chest pain or shortness of breath  Denies history of similar  Pain localized to epigastric area without radiation  No history of abdominal surgeries  No fevers or chills  No urinary symptoms  No other complaints  Prior to Admission Medications   Prescriptions Last Dose Informant Patient Reported? Taking?   acetaminophen (TYLENOL) 325 mg tablet   No No   Sig: Take 1 tablet (325 mg total) by mouth every 6 (six) hours as needed for mild pain   Patient not taking: Reported on 7/5/2022   ibuprofen (MOTRIN) 600 mg tablet   No No   Sig: Take 1 tablet (600 mg total) by mouth every 6 (six) hours as needed for mild pain   Patient not taking: Reported on 7/5/2022   naproxen (NAPROSYN) 375 mg tablet   No No   Sig: Take 1 tablet (375 mg total) by mouth 2 (two) times a day with meals   oxyCODONE-acetaminophen (PERCOCET) 5-325 mg per tablet   No No   Sig: Take 1 tablet by mouth every 4 (four) hours as needed for moderate pain Max Daily Amount: 6 tablets   Patient not taking: Reported on 7/5/2022      Facility-Administered Medications: None       History reviewed  No pertinent past medical history  History reviewed  No pertinent surgical history  History reviewed  No pertinent family history  I have reviewed and agree with the history as documented  E-Cigarette/Vaping    E-Cigarette Use Never User      E-Cigarette/Vaping Substances     Social History     Tobacco Use    Smoking status: Never Smoker    Smokeless tobacco: Never Used   Vaping Use    Vaping Use: Never used   Substance Use Topics    Alcohol use: No    Drug use: No       Review of Systems   Constitutional: Negative for chills and fever  HENT: Negative for congestion, ear pain and sore throat      Eyes: Negative for pain    Respiratory: Negative for cough and shortness of breath  Cardiovascular: Negative for chest pain  Gastrointestinal: Positive for abdominal pain and nausea  Negative for vomiting  Genitourinary: Negative for dysuria  Musculoskeletal: Negative for back pain  Skin: Negative for rash  Neurological: Negative for dizziness and numbness  Psychiatric/Behavioral: Negative for suicidal ideas  All other systems reviewed and are negative  Physical Exam  Physical Exam  Vitals reviewed  Constitutional:       Appearance: She is well-developed  HENT:      Head: Normocephalic and atraumatic  Right Ear: External ear normal       Left Ear: External ear normal       Nose: Nose normal    Cardiovascular:      Rate and Rhythm: Normal rate and regular rhythm  Heart sounds: Normal heart sounds  Pulmonary:      Effort: Pulmonary effort is normal       Breath sounds: Normal breath sounds  Abdominal:      General: Bowel sounds are normal       Palpations: Abdomen is soft  Tenderness: There is abdominal tenderness in the epigastric area  There is no guarding  Musculoskeletal:         General: Normal range of motion  Cervical back: Normal range of motion and neck supple  Skin:     General: Skin is warm and dry  Capillary Refill: Capillary refill takes less than 2 seconds  Neurological:      Mental Status: She is alert and oriented to person, place, and time     Psychiatric:         Behavior: Behavior normal          Vital Signs  ED Triage Vitals [08/01/22 1955]   Temperature Pulse Respirations Blood Pressure SpO2   98 3 °F (36 8 °C) 69 16 134/85 100 %      Temp Source Heart Rate Source Patient Position - Orthostatic VS BP Location FiO2 (%)   Oral Monitor Sitting Left arm --      Pain Score       --           Vitals:    08/01/22 1955   BP: 134/85   Pulse: 69   Patient Position - Orthostatic VS: Sitting         Visual Acuity      ED Medications  Medications   sodium chloride 0 9 % bolus 1,000 mL (0 mL Intravenous Stopped 8/1/22 2302)   aluminum-magnesium hydroxide-simethicone (MYLANTA) oral suspension 30 mL (30 mL Oral Given 8/1/22 2021)   famotidine (PEPCID) tablet 20 mg (20 mg Oral Given 8/1/22 2021)   Lidocaine Viscous HCl (XYLOCAINE) 2 % mucosal solution 15 mL (15 mL Swish & Swallow Given 8/1/22 2021)       Diagnostic Studies  Results Reviewed     Procedure Component Value Units Date/Time    HS Troponin I 2hr [[id number]]  (Normal) Collected: 08/01/22 2209    Lab Status: Final result Specimen: Blood from Arm, Left Updated: 08/01/22 2259     hs TnI 2hr 11 ng/L      Delta 2hr hsTnI 0 ng/L     HS Troponin I 4hr [[id number]]     Lab Status: No result Specimen: Blood     UA w Reflex to Microscopic w Reflex to Culture [[id number]]  (Abnormal) Collected: 08/01/22 2032    Lab Status: Final result Specimen: Urine, Clean Catch Updated: 08/01/22 2055     Color, UA Allie     Clarity, UA Slightly Cloudy     Specific Gravity, UA 1 025     pH, UA 5 0     Leukocytes, UA Negative     Nitrite, UA Negative     Protein, UA Negative mg/dl      Glucose, UA Negative mg/dl      Ketones, UA Negative mg/dl      Bilirubin, UA Negative     Occult Blood, UA Negative     UROBILINOGEN UA Negative mg/dL     HS Troponin 0hr (reflex protocol) [[id number]]  (Normal) Collected: 08/01/22 2017    Lab Status: Final result Specimen: Blood from Arm, Left Updated: 08/01/22 2050     hs TnI 0hr 11 ng/L     Lipase [[id number]]  (Normal) Collected: 08/01/22 2017    Lab Status: Final result Specimen: Blood from Arm, Left Updated: 08/01/22 2042     Lipase 73 u/L     Comprehensive metabolic panel [[id number]]  (Abnormal) Collected: 08/01/22 2017    Lab Status: Final result Specimen: Blood from Arm, Left Updated: 08/01/22 2042     Sodium 139 mmol/L      Potassium 3 4 mmol/L      Chloride 103 mmol/L      CO2 28 mmol/L      ANION GAP 8 mmol/L      BUN 11 mg/dL      Creatinine 0 76 mg/dL      Glucose 81 mg/dL      Calcium 9 1 mg/dL      AST 24 U/L      ALT 25 U/L      Alkaline Phosphatase 61 U/L      Total Protein 8 5 g/dL      Albumin 4 6 g/dL      Total Bilirubin 0 62 mg/dL      eGFR 104 ml/min/1 73sq m     Narrative:      [place] guidelines for Chronic Kidney Disease (CKD):     Stage 1 with normal or high GFR (GFR > 90 mL/min/1 73 square meters)    Stage 2 Mild CKD (GFR = 60-89 mL/min/1 73 square meters)    Stage 3A Moderate CKD (GFR = 45-59 mL/min/1 73 square meters)    Stage 3B Moderate CKD (GFR = 30-44 mL/min/1 73 square meters)    Stage 4 Severe CKD (GFR = 15-29 mL/min/1 73 square meters)    Stage 5 End Stage CKD (GFR <15 mL/min/1 73 square meters)  Note: GFR calculation is accurate only with a steady state creatinine    POCT pregnancy, urine [[id number]]  (Normal) Resulted: 08/01/22 2032    Lab Status: Final result Updated: 08/01/22 2032     EXT PREG TEST UR (Ref: Negative) Negative     Control Valid    CBC and differential [[id number]]  (Abnormal) Collected: 08/01/22 2017    Lab Status: Final result Specimen: Blood from Arm, Left Updated: 08/01/22 2028     WBC 3 42 Thousand/uL      RBC 4 41 Million/uL      Hemoglobin 13 9 g/dL      Hematocrit 42 8 %      MCV 97 fL      MCH 31 5 pg      MCHC 32 5 g/dL      RDW 13 0 %      MPV 9 6 fL      Platelets 454 Thousands/uL      nRBC 0 /100 WBCs      Neutrophils Relative 45 %      Immat GRANS % 0 %      Lymphocytes Relative 42 %      Monocytes Relative 10 %      Eosinophils Relative 2 %      Basophils Relative 1 %      Neutrophils Absolute 1 57 Thousands/µL      Immature Grans Absolute 0 00 Thousand/uL      Lymphocytes Absolute 1 42 Thousands/µL      Monocytes Absolute 0 33 Thousand/µL      Eosinophils Absolute 0 07 Thousand/µL      Basophils Absolute 0 03 Thousands/µL                  No orders to display              Procedures  Procedures         ED Course  ED Course as of 08/01/22 2305   [facility] Aug 01, 2022   2028 Procedure Note: EKG  Date/Time: 08/01/22 8:11 PM   Performed by: Pia Sebastian  Authorized by: Pia Sebastian  ECG interpreted by me, the ED Provider: yes   The EKG demonstrates:  Rate 54  Rhythm sinus simeon  QTc 415  No ST elevations/depressions                                                 MDM  Number of Diagnoses or Management Options  Gastritis  Diagnosis management comments: Symptoms resolved  Labs unremarkable  Likely gastritis  Will Dc with PCP f/u      Disposition  Final diagnoses:   Gastritis     Time reflects when diagnosis was documented in both MDM as applicable and the Disposition within this note     Time User Action Codes Description Comment    8/1/2022 11:00 PM Duffy, Juaquin Kanner Add [K29 70] Gastritis       ED Disposition     ED Disposition   Discharge    Condition   Stable    Date/Time   Mon Aug 1, 2022 11:00 PM    [address] discharge to home/self care                 Follow-up Information     Follow up With Specialties Details Why Contact Info Additional [address] [address]   [address], [address] [facility] [address]  [address], [address], [address] [place], [place], [address]    [facility] Call in 2 days  [address] [facility] [address] [address]  Qian Staples [address] [facility], [address] [facility], [address], [address] [phone]          Patient's Medications   Discharge Prescriptions    ALUMINUM-MAGNESIUM HYDROXIDE 200-200 MG/5ML SUSPENSION    Take 15 mL by mouth every 6 (six) hours as needed for heartburn       Start Date: 8/1/2022  End Date: --       Order Dose: 15 mL       Quantity: 355 mL    Refills: 0    PANTOPRAZOLE (PROTONIX) 20 MG TABLET    Take 1 tablet (20 mg total) by mouth daily       Start Date: 8/1/2022  End Date: --       Order Dose: 20 mg       Quantity: 14 tablet    Refills: 0    SUCRALFATE (CARAFATE) 1 G TABLET    Take 1 tablet (1 g total) by mouth 4 (four) times a day       Start Date: 8/1/2022  End Date: --       Order Dose: 1 g       Quantity: 28 tablet    Refills: 0       No discharge procedures on file      PDMP Review     None          ED Provider  Electronically Signed by           Karsten Bermudez PA-C  08/01/22 9260

## 2022-09-13 ENCOUNTER — APPOINTMENT (OUTPATIENT)
Dept: RADIOLOGY | Facility: HOSPITAL | Age: 32
End: 2022-09-13
Payer: COMMERCIAL

## 2022-09-13 ENCOUNTER — HOSPITAL ENCOUNTER (EMERGENCY)
Facility: HOSPITAL | Age: 32
Discharge: HOME | End: 2022-09-13
Attending: EMERGENCY MEDICINE
Payer: COMMERCIAL

## 2022-09-13 VITALS
WEIGHT: 186.95 LBS | HEIGHT: 62 IN | TEMPERATURE: 97.8 F | OXYGEN SATURATION: 100 % | DIASTOLIC BLOOD PRESSURE: 81 MMHG | HEART RATE: 68 BPM | RESPIRATION RATE: 16 BRPM | BODY MASS INDEX: 34.4 KG/M2 | SYSTOLIC BLOOD PRESSURE: 125 MMHG

## 2022-09-13 DIAGNOSIS — M25.532 LEFT WRIST PAIN: Primary | ICD-10-CM

## 2022-09-13 PROCEDURE — 96372 THER/PROPH/DIAG INJ SC/IM: CPT

## 2022-09-13 PROCEDURE — 73110 X-RAY EXAM OF WRIST: CPT

## 2022-09-13 PROCEDURE — 99284 EMERGENCY DEPT VISIT MOD MDM: CPT | Performed by: EMERGENCY MEDICINE

## 2022-09-13 PROCEDURE — 99283 EMERGENCY DEPT VISIT LOW MDM: CPT

## 2022-09-13 RX ORDER — NAPROXEN 500 MG/1
500 TABLET ORAL 2 TIMES DAILY WITH MEALS
Qty: 30 TABLET | Refills: 0 | Status: SHIPPED | OUTPATIENT
Start: 2022-09-13

## 2022-09-13 RX ORDER — KETOROLAC TROMETHAMINE 30 MG/ML
15 INJECTION, SOLUTION INTRAMUSCULAR; INTRAVENOUS ONCE
Status: COMPLETED | OUTPATIENT
Start: 2022-09-13 | End: 2022-09-13

## 2022-09-13 RX ADMIN — KETOROLAC TROMETHAMINE 15 MG: 30 INJECTION, SOLUTION INTRAMUSCULAR; INTRAVENOUS at 09:39
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
## 2022-09-13 NOTE — Clinical Note
Ladonna Mahmood was seen and treated in our emergency department on 9/13/2022  Diagnosis:     Kylie Floor  is off the rest of the shift today, may return to work on return date  She may return on this date: 09/15/2022    Can return sooner if feeling okay     If you have any questions or concerns, please don't hesitate to call        Adebayo Fish MD    ______________________________           _______________          _______________  Hospital Representative                              Date                                Time
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
## 2022-11-09 ENCOUNTER — HOSPITAL ENCOUNTER (EMERGENCY)
Facility: HOSPITAL | Age: 32
Discharge: HOME | End: 2022-11-09
Attending: EMERGENCY MEDICINE

## 2022-11-09 VITALS
TEMPERATURE: 98.5 F | BODY MASS INDEX: 36.64 KG/M2 | OXYGEN SATURATION: 100 % | HEART RATE: 70 BPM | RESPIRATION RATE: 18 BRPM | WEIGHT: 200.3 LBS | DIASTOLIC BLOOD PRESSURE: 79 MMHG | SYSTOLIC BLOOD PRESSURE: 133 MMHG

## 2022-11-09 DIAGNOSIS — J06.9 INFECTION OF THE UPPER RESPIRATORY TRACT: Primary | ICD-10-CM

## 2022-11-09 LAB
FLUAV RNA RESP QL NAA+PROBE: NEGATIVE
FLUBV RNA RESP QL NAA+PROBE: NEGATIVE
RSV RNA RESP QL NAA+PROBE: NEGATIVE
SARS-COV-2 RNA RESP QL NAA+PROBE: NEGATIVE

## 2022-11-09 RX ORDER — ACETAMINOPHEN 500 MG
500 TABLET ORAL EVERY 6 HOURS PRN
Qty: 30 TABLET | Refills: 0 | Status: SHIPPED | OUTPATIENT
Start: 2022-11-09

## 2022-11-09 RX ORDER — IBUPROFEN 400 MG/1
400 TABLET ORAL EVERY 6 HOURS PRN
Qty: 12 TABLET | Refills: 0 | Status: SHIPPED | OUTPATIENT
Start: 2022-11-09

## 2022-11-09 NOTE — Clinical Note
Mari Iraheta was seen and treated in our emergency department on 11/9/2022  Diagnosis:     Joseph Dooley  may return to work on return date  She may return on this date: 11/12/2022    May not return to work/school until COVID-19 results return  If negative may return to school/work on date listed above  If Positive Hasmukh Genre is required until 5 days after symptoms began  If you have any questions or concerns, please don't hesitate to call        Kj Victor PA-C    ______________________________           _______________          _______________  Hospital Representative                              Date                                Time

## 2022-11-09 NOTE — ED PROVIDER NOTES
History  Chief Complaint   Patient presents with   • Cough     2 days and now has lost her voice       59-year-old female reports no significant past medical conditions presents for evaluation nasal congestion a cough and sore throat which have resolved however the patient reports she has lost her voice  Patient denies any chest pain shortness of breath, abdominal pain, nausea, vomiting, fevers, chills  Patient reports her sore throat and cough have resolved at this time however she is unable to attend work and is required to have a COVID test prior to returning to work  History provided by:  Patient   used: No    Cough  Associated symptoms: sore throat    Associated symptoms: no chest pain, no chills, no ear pain, no fever, no rash, no rhinorrhea and no shortness of breath        Prior to Admission Medications   Prescriptions Last Dose Informant Patient Reported?  Taking?   acetaminophen (TYLENOL) 325 mg tablet   No No   Sig: Take 1 tablet (325 mg total) by mouth every 6 (six) hours as needed for mild pain   Patient not taking: Reported on 7/5/2022   aluminum-magnesium hydroxide 200-200 MG/5ML suspension   No No   Sig: Take 15 mL by mouth every 6 (six) hours as needed for heartburn   ibuprofen (MOTRIN) 600 mg tablet   No No   Sig: Take 1 tablet (600 mg total) by mouth every 6 (six) hours as needed for mild pain   Patient not taking: Reported on 7/5/2022   naproxen (NAPROSYN) 375 mg tablet   No No   Sig: Take 1 tablet (375 mg total) by mouth 2 (two) times a day with meals   naproxen (Naprosyn) 500 mg tablet   No No   Sig: Take 1 tablet (500 mg total) by mouth 2 (two) times a day with meals   oxyCODONE-acetaminophen (PERCOCET) 5-325 mg per tablet   No No   Sig: Take 1 tablet by mouth every 4 (four) hours as needed for moderate pain Max Daily Amount: 6 tablets   Patient not taking: Reported on 7/5/2022   pantoprazole (PROTONIX) 20 mg tablet   No No   Sig: Take 1 tablet (20 mg total) by mouth daily   sucralfate (CARAFATE) 1 g tablet   No No   Sig: Take 1 tablet (1 g total) by mouth 4 (four) times a day      Facility-Administered Medications: None       History reviewed  No pertinent past medical history  History reviewed  No pertinent surgical history  History reviewed  No pertinent family history  I have reviewed and agree with the history as documented  E-Cigarette/Vaping   • E-Cigarette Use Never User      E-Cigarette/Vaping Substances     Social History     Tobacco Use   • Smoking status: Never Smoker   • Smokeless tobacco: Never Used   Vaping Use   • Vaping Use: Never used   Substance Use Topics   • Alcohol use: No     Comment: occasional   • Drug use: No       Review of Systems   Constitutional: Negative for chills, fatigue and fever  HENT: Positive for congestion and sore throat  Negative for ear pain and rhinorrhea  Eyes: Negative for redness  Respiratory: Positive for cough  Negative for chest tightness and shortness of breath  Cardiovascular: Negative for chest pain and palpitations  Gastrointestinal: Negative for abdominal pain, nausea and vomiting  Genitourinary: Negative for dysuria and hematuria  Musculoskeletal: Negative  Skin: Negative for rash  Neurological: Negative for dizziness, syncope, light-headedness and numbness  Physical Exam  Physical Exam  Vitals and nursing note reviewed  Constitutional:       Appearance: She is well-developed  HENT:      Head: Normocephalic  Mouth/Throat:      Comments: Patient with a raspy voice however speaking in full sentences managing oral secretions that difficulty   clear oropharynx midline uvula  Eyes:      General: No scleral icterus  Cardiovascular:      Rate and Rhythm: Normal rate and regular rhythm  Pulmonary:      Effort: Pulmonary effort is normal       Breath sounds: Normal breath sounds  No stridor  Abdominal:      General: There is no distension  Palpations: Abdomen is soft  Tenderness: There is no abdominal tenderness  Musculoskeletal:         General: Normal range of motion  Skin:     General: Skin is warm and dry  Capillary Refill: Capillary refill takes less than 2 seconds  Neurological:      Mental Status: She is alert and oriented to person, place, and time  Vital Signs  ED Triage Vitals [11/09/22 1757]   Temperature Pulse Respirations Blood Pressure SpO2   98 5 °F (36 9 °C) 70 18 133/79 100 %      Temp src Heart Rate Source Patient Position - Orthostatic VS BP Location FiO2 (%)   -- -- -- -- --      Pain Score       No Pain           Vitals:    11/09/22 1757   BP: 133/79   Pulse: 70         Visual Acuity      ED Medications  Medications - No data to display    Diagnostic Studies  Results Reviewed     Procedure Component Value Units Date/Time    FLU/RSV/COVID - if FLU/RSV clinically relevant [[id number]] Collected: 11/09/22 1808    Lab Status: In process Specimen: Nares from Nose Updated: 11/09/22 1825                 No orders to display              Procedures  Procedures         ED Course                                             MDM  Number of Diagnoses or Management Options  Infection of the upper respiratory tract  Diagnosis management comments: All imaging and/or lab testing discussed with patient, strict return to ED precautions discussed  Patient and/or family members verbalizes understanding and agrees with plan  Patient is stable for discharge     Portions of the record may have been created with voice recognition software  Occasional wrong word or "sound a like" substitutions may have occurred due to the inherent limitations of voice recognition software  Read the chart carefully and recognize, using context, where substitutions have occurred          Disposition  Final diagnoses:   Infection of the upper respiratory tract     Time reflects when diagnosis was documented in both MDM as applicable and the Disposition within this note     Time User Action Codes Description Comment    11/9/2022  6:08 PM Jeimy Suarez Add [J06 9] Infection of the upper respiratory tract       ED Disposition     ED Disposition   Discharge    Condition   Good    Date/Time   Wed Nov 9, 2022  6:08 PM    [address] discharge to home/self care  Follow-up Information     Follow up With Specialties Details Why Contact Info    Genaro Perez MD Family Medicine Schedule an appointment as soon as possible for a visit in 2 days As needed [address] [facility] [place] [id number]            Discharge Medication List as of 11/9/2022  6:09 PM      START taking these medications    Details   !! acetaminophen (TYLENOL) 500 mg tablet Take 1 tablet (500 mg total) by mouth every 6 (six) hours as needed (pain), Starting Wed 11/9/2022, Normal      !! ibuprofen (MOTRIN) 400 mg tablet Take 1 tablet (400 mg total) by mouth every 6 (six) hours as needed for mild pain, Starting Wed 11/9/2022, Normal       !! - Potential duplicate medications found  Please discuss with provider        CONTINUE these medications which have NOT CHANGED    Details   !! acetaminophen (TYLENOL) 325 mg tablet Take 1 tablet (325 mg total) by mouth every 6 (six) hours as needed for mild pain, Starting Fri 5/15/2020, Normal      aluminum-magnesium hydroxide 200-200 MG/5ML suspension Take 15 mL by mouth every 6 (six) hours as needed for heartburn, Starting Mon 8/1/2022, Normal      !! ibuprofen (MOTRIN) 600 mg tablet Take 1 tablet (600 mg total) by mouth every 6 (six) hours as needed for mild pain, Starting Tue 7/14/2020, Normal      !! naproxen (NAPROSYN) 375 mg tablet Take 1 tablet (375 mg total) by mouth 2 (two) times a day with meals, Starting Tue 7/5/2022, Normal      !! naproxen (Naprosyn) 500 mg tablet Take 1 tablet (500 mg total) by mouth 2 (two) times a day with meals, Starting Tue 9/13/2022, Normal      oxyCODONE-acetaminophen (PERCOCET) 5-325 mg per tablet Take 1 tablet by mouth every 4 (four) hours as needed for moderate pain Max Daily Amount: 6 tablets, Starting Sat 9/22/2018, Print      pantoprazole (PROTONIX) 20 mg tablet Take 1 tablet (20 mg total) by mouth daily, Starting Mon 8/1/2022, Normal      sucralfate (CARAFATE) 1 g tablet Take 1 tablet (1 g total) by mouth 4 (four) times a day, Starting Mon 8/1/2022, Normal       !! - Potential duplicate medications found  Please discuss with provider  No discharge procedures on file      PDMP Review     None          ED Provider  Electronically Signed by           Chencho Shannon PA-C  11/09/22 3620

## 2023-01-12 ENCOUNTER — ULTRASOUND (OUTPATIENT)
Dept: OBGYN CLINIC | Facility: CLINIC | Age: 33
End: 2023-01-12

## 2023-01-12 VITALS
WEIGHT: 199.6 LBS | DIASTOLIC BLOOD PRESSURE: 74 MMHG | SYSTOLIC BLOOD PRESSURE: 114 MMHG | TEMPERATURE: 73 F | BODY MASS INDEX: 36.73 KG/M2 | HEIGHT: 62 IN

## 2023-01-12 DIAGNOSIS — N92.6 MISSED MENSES: Primary | ICD-10-CM

## 2023-01-12 LAB — SL AMB POCT URINE HCG: POSITIVE

## 2023-01-12 RX ORDER — PNV NO.95/FERROUS FUM/FOLIC AC 28MG-0.8MG
1 TABLET ORAL DAILY
Qty: 90 TABLET | Refills: 4 | Status: SHIPPED | OUTPATIENT
Start: 2023-01-12

## 2023-01-12 NOTE — LETTER
[address] REFERRAL      Date: 1/12/2023    Patient name: Nilda Christianson    YOB: 1990    Estimated Date of Delivery: 9/5/23      /74   Temp (!) 73 °F (22 8 °C)   Ht 5' 2" (1 575 m)   Wt 90 5 kg (199 lb 9 6 oz)   LMP 11/16/2022   BMI 36 51 kg/m²         Thank you,  Xochilt Lock, [facility] [place] [address] [address] locations:   1  [facility] and [address] [address]       Phone: [phone]       Fax: [phone]     2   [address] [place] [address]       Phone: [phone]       Fax: [phone]

## 2023-01-12 NOTE — PATIENT INSTRUCTIONS
Jessica por ricketts confianza en nuestro equipo  Amador Larson y agradecemos leoncio comentarios  Si recibe radha encuesta nuestra, tómese unos momentos para informarnos cómo estamos     Sinceramente,  [address] [facility]

## 2023-01-12 NOTE — PROGRESS NOTES
Oni Durham presents today for viability/dating ultrasound  Patient's last menstrual period was approximately 11/16/2022  She reports positive pregnancy test at home on 12/26/22  She reports that she had a small amount of vaginal spotting 2 weeks ago x1 episode but none since  Denies abdominal/pelvic pain  /74   Temp (!) 73 °F (22 8 °C)   Ht 5' 2" (1 575 m)   Wt 90 5 kg (199 lb 9 6 oz)   LMP 11/16/2022   BMI 36 51 kg/m²   Abdomen soft and non-tender  Transvaginal Pelvic Ultrasound:  # of fetuses: 1  Intrauterine: yes  Average CRL: 0 53cm (6w2d)  EDC based on CRL: 9/5/23  Fetal cardiac activity: present  Additional Findings: + yolk sac   FHR: 120    Please choose how you are assigning the SHAHRIAR: The LMP is unknown or uncertain, therefore the final SHAHRIAR will be based on today's ultrasound  Final SHAHRIAR: 9/5/2023 by ultrasound at this encounter  Return in 2 weeks for OB intake  Rx prenatal vitamins sent to pharmacy      Current Outpatient Medications   Medication Instructions   • Prenatal Vit-Fe Fumarate-FA (Prenatal Vitamin) 27-0 8 MG TABS 1 tablet, Oral, Daily       George Baron, [address] [address]  Dept: [phone]  Dept Fax: [phone] 35: [phone]  [address] [place] [address] South: [phone]  [address] [place] [address] Fax: [phone]

## 2023-01-18 ENCOUNTER — TELEPHONE (OUTPATIENT)
Dept: OBGYN CLINIC | Facility: CLINIC | Age: 33
End: 2023-01-18

## 2023-01-24 ENCOUNTER — PATIENT OUTREACH (OUTPATIENT)
Dept: OBGYN CLINIC | Facility: CLINIC | Age: 33
End: 2023-01-24

## 2023-01-24 ENCOUNTER — INITIAL PRENATAL (OUTPATIENT)
Dept: OBGYN CLINIC | Facility: CLINIC | Age: 33
End: 2023-01-24

## 2023-01-24 VITALS
BODY MASS INDEX: 37.1 KG/M2 | HEIGHT: 62 IN | WEIGHT: 201.6 LBS | SYSTOLIC BLOOD PRESSURE: 102 MMHG | HEART RATE: 73 BPM | DIASTOLIC BLOOD PRESSURE: 70 MMHG

## 2023-01-24 DIAGNOSIS — Z3A.08 8 WEEKS GESTATION OF PREGNANCY: ICD-10-CM

## 2023-01-24 DIAGNOSIS — Z34.91 PRENATAL CARE IN FIRST TRIMESTER: Primary | ICD-10-CM

## 2023-01-24 NOTE — LETTER
[employer] REFERRAL      Date: 1/24/2023    Patient name: Eric Nelson    YOB: 1990    Estimated Date of Delivery: 9/5/23      /70 (BP Location: Left arm, Patient Position: Sitting, Cuff Size: Adult)   Pulse 73   Ht 5' 2" (1 575 m)   Wt 91 4 kg (201 lb 9 6 oz)   LMP 11/16/2022   BMI 36 87 kg/m²         Thank you,  Garrett Moreno, [address] [place] [address] [employer] locations:   1  [facility] and [address]       [address]       Phone: [phone]       Fax: [phone]     2   [address]       [facility]        [address]       Phone: [phone]       Fax: [phone]

## 2023-01-24 NOTE — PROGRESS NOTES
OBSTETRIC INTAKE VISIT    Calderon Clements I Lora Watson presents today for initial OB visit and intake at 8w0d  LMP - 22  History obtained from patient and she reports it as follows:    Past Medical History:   Diagnosis Date   • Asthma      History reviewed  No pertinent surgical history  OB History    Para Term  AB Living   1 0 0 0 0 0   SAB IAB Ectopic Multiple Live Births   0 0 0 0 0      # Outcome Date GA Lbr Van/2nd Weight Sex Delivery Anes PTL Lv   1 Current              Social History     Tobacco Use   • Smoking status: Never   • Smokeless tobacco: Never   Vaping Use   • Vaping Use: Never used   Substance Use Topics   • Alcohol use: No     Comment: occasional   • Drug use: Never       Current Outpatient Medications   Medication Instructions   • Prenatal Vit-Fe Fumarate-FA (Prenatal Vitamin) 27-0 8 MG TABS 1 tablet, Oral, Daily       Allergies   Allergen Reactions   • Penicillins        Vitals: /70 (BP Location: Left arm, Patient Position: Sitting, Cuff Size: Adult)   Pulse 73   Ht 5' 2" (1 575 m)   Wt 91 4 kg (201 lb 9 6 oz)   LMP 2022   BMI 36 87 kg/m²     Review of Systems:  Denies vaginal bleeding or leaking fluid  Denies abdominal/pelvic pain or contractions  Plan:  1  OB labwork ordered today to include Prenatal Panel, HCV antibody, Hgb fractionation cascade, Prenatal Carrier Screen Panel  Other labs include Glucose 1H PG  Advised patient to have drawn within the next few days  2  Will help pt schedule ultrasound with MFM  3  Return 23 for H&P prenatal visit  4  Referrals placed for [facility], , and [facility]  5  Given vaccines: Pt declines Flu vaccine at this time  6  Patient's depression screening was assessed with a PHQ-2 score of 0  Their PHQ-9 score was 1  Clinically patient does not have depression  No treatment is required   in to see patient    7  Reviewed the following educational topics with patient:   -routine prenatal visit/ultrasound/labwork schedule   -hospital for delivery and office phone/answering service contact information   -nutritional demands of pregnancy, healthy dietary habits   -listeria, toxoplasmosis, seafood precautions   -weight gain expectations (based on pre-pregnant BMI)   -exercise, rest, and sexual activity during pregnancy   -abstinence from alcohol, tobacco, and illegal drugs   -common discomforts of pregnancy and appropriate management   -OTC medications safe to use in pregnancy   -genetic screening options   -vaccines in pregnancy   -symptoms to report to OB provider    -signs of PTL and pre-eclampsia    -vaginal bleeding/leaking of fluid    -severe n/v-unable to tolerate ANY food/fluids for more than 24 hours

## 2023-01-24 NOTE — LETTER
1/24/2023      This letter is to confirm that [address], [address] is pregnant and is under our care  Her Estimated Date of Delivery: 9/5/23  If you have any questions or concerns, please contact our office    Thank you,    TAPAN Arias

## 2023-01-24 NOTE — PATIENT INSTRUCTIONS
SENALES TRISH EL EMBARAZO  Llame a nuestra oficina al [phone] para cualquiera de los siguientes:    1  sangrado vaginal  2  Dolor abdominal sharona que no desaparece  3  Fiebre (más de 100 4 y no se patsy con Tylenol)  4  Vómitos persistentes que bermeo más de 24 horas  5  dolor de pecho  6  Dolor o ardor al orinar  7  Dolor de davin severo que no se resuelve con Tylenol  8  Visión borrosa o natasha puntos en ricketts visión  9  Hinchazón repentina de ricketts jensen o reji  10  Enrojecimiento, hinchazón o dolor en radha pierna  11  Un aumento de peso repentino en pocos días  12  Contar los movimientos fetales del bebé  (después de 28 semanas o el sexto mes de embarazo)  15  Radha pérdida de líquido acuoso de la vagina: puede ser un chorro, un goteo o radha humedad continua  14   Después de 20 semanas de embarazo, calambres rítmicos (más de 4 por hora) o menstruales olimpia dolor Jennifer Mo / Hailee Kinyarwanda

## 2023-01-24 NOTE — PROGRESS NOTES
ESMER THURSTON was referred by TAPAN Grier to complete a PN SW assessment in the first trimester  Pt is  with an SHAHRIAR of 2023, her GA is 8w0d  She is Austrian speaking only, ESMER THURSTON utilized the interprtor today to complete the assessment:    Pt reports this pregnancy is a surprised but she and FOB are both excited, this is the first baby for both  She and FOB live together in a rented house, she denies any housing concerns  She drives herself to appointments  She has MA, she does not have SNAP, she plans to apply for [facility]  She is not currently working  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

## 2023-01-31 ENCOUNTER — INITIAL PRENATAL (OUTPATIENT)
Dept: OBGYN CLINIC | Facility: CLINIC | Age: 33
End: 2023-01-31

## 2023-01-31 ENCOUNTER — APPOINTMENT (OUTPATIENT)
Dept: LAB | Facility: CLINIC | Age: 33
End: 2023-01-31

## 2023-01-31 VITALS — SYSTOLIC BLOOD PRESSURE: 123 MMHG | BODY MASS INDEX: 36.76 KG/M2 | DIASTOLIC BLOOD PRESSURE: 67 MMHG | WEIGHT: 201 LBS

## 2023-01-31 DIAGNOSIS — Z34.91 PRENATAL CARE IN FIRST TRIMESTER: ICD-10-CM

## 2023-01-31 DIAGNOSIS — Z3A.09 9 WEEKS GESTATION OF PREGNANCY: ICD-10-CM

## 2023-01-31 DIAGNOSIS — Z34.91 PRENATAL CARE IN FIRST TRIMESTER: Primary | ICD-10-CM

## 2023-01-31 DIAGNOSIS — J45.909 ASTHMA DURING PREGNANCY: ICD-10-CM

## 2023-01-31 DIAGNOSIS — Z3A.08 8 WEEKS GESTATION OF PREGNANCY: ICD-10-CM

## 2023-01-31 DIAGNOSIS — O99.211 OBESITY AFFECTING PREGNANCY IN FIRST TRIMESTER: ICD-10-CM

## 2023-01-31 DIAGNOSIS — O99.519 ASTHMA DURING PREGNANCY: ICD-10-CM

## 2023-01-31 PROBLEM — O99.210 OBESITY AFFECTING PREGNANCY: Status: ACTIVE | Noted: 2023-01-31

## 2023-01-31 LAB
ABO GROUP BLD: NORMAL
BACTERIA UR QL AUTO: ABNORMAL /HPF
BASOPHILS # BLD AUTO: 0.03 THOUSANDS/ÂΜL (ref 0–0.1)
BASOPHILS NFR BLD AUTO: 1 % (ref 0–1)
BILIRUB UR QL STRIP: NEGATIVE
BLD GP AB SCN SERPL QL: NEGATIVE
CLARITY UR: CLEAR
COLOR UR: YELLOW
EOSINOPHIL # BLD AUTO: 0.09 THOUSAND/ÂΜL (ref 0–0.61)
EOSINOPHIL NFR BLD AUTO: 2 % (ref 0–6)
ERYTHROCYTE [DISTWIDTH] IN BLOOD BY AUTOMATED COUNT: 12.5 % (ref 11.6–15.1)
GLUCOSE UR STRIP-MCNC: NEGATIVE MG/DL
HBV SURFACE AG SER QL: NORMAL
HCT VFR BLD AUTO: 32.5 % (ref 34.8–46.1)
HCV AB SER QL: NORMAL
HGB BLD-MCNC: 11.1 G/DL (ref 11.5–15.4)
HGB UR QL STRIP.AUTO: ABNORMAL
IMM GRANULOCYTES # BLD AUTO: 0.02 THOUSAND/UL (ref 0–0.2)
IMM GRANULOCYTES NFR BLD AUTO: 1 % (ref 0–2)
KETONES UR STRIP-MCNC: NEGATIVE MG/DL
LEUKOCYTE ESTERASE UR QL STRIP: NEGATIVE
LYMPHOCYTES # BLD AUTO: 0.92 THOUSANDS/ÂΜL (ref 0.6–4.47)
LYMPHOCYTES NFR BLD AUTO: 24 % (ref 14–44)
MCH RBC QN AUTO: 32.3 PG (ref 26.8–34.3)
MCHC RBC AUTO-ENTMCNC: 34.2 G/DL (ref 31.4–37.4)
MCV RBC AUTO: 95 FL (ref 82–98)
MONOCYTES # BLD AUTO: 0.26 THOUSAND/ÂΜL (ref 0.17–1.22)
MONOCYTES NFR BLD AUTO: 7 % (ref 4–12)
MUCOUS THREADS UR QL AUTO: ABNORMAL
NEUTROPHILS # BLD AUTO: 2.47 THOUSANDS/ÂΜL (ref 1.85–7.62)
NEUTS SEG NFR BLD AUTO: 65 % (ref 43–75)
NITRITE UR QL STRIP: NEGATIVE
NON-SQ EPI CELLS URNS QL MICRO: ABNORMAL /HPF
NRBC BLD AUTO-RTO: 0 /100 WBCS
PH UR STRIP.AUTO: 6 [PH]
PLATELET # BLD AUTO: 243 THOUSANDS/UL (ref 149–390)
PMV BLD AUTO: 10.1 FL (ref 8.9–12.7)
PROT UR STRIP-MCNC: ABNORMAL MG/DL
RBC # BLD AUTO: 3.44 MILLION/UL (ref 3.81–5.12)
RBC #/AREA URNS AUTO: ABNORMAL /HPF
RH BLD: POSITIVE
RUBV IGG SERPL IA-ACNC: 87.2 IU/ML
SP GR UR STRIP.AUTO: >=1.03 (ref 1–1.03)
SPECIMEN EXPIRATION DATE: NORMAL
UROBILINOGEN UR STRIP-ACNC: <2 MG/DL
WBC # BLD AUTO: 3.79 THOUSAND/UL (ref 4.31–10.16)
WBC #/AREA URNS AUTO: ABNORMAL /HPF

## 2023-01-31 RX ORDER — ALBUTEROL SULFATE 90 UG/1
2 AEROSOL, METERED RESPIRATORY (INHALATION) EVERY 6 HOURS PRN
Qty: 8.5 G | Refills: 2 | Status: SHIPPED | OUTPATIENT
Start: 2023-01-31

## 2023-01-31 NOTE — PROGRESS NOTES
Jace Pineda presents today for H&P OB visit at 9w0d   FS=192/67  Wt=91 2 kg (201 lb); Body mass index is 36 76 kg/m² ; TWG=4 99 kg (11 lb)  Fetal Heart Rate: 170  Abdomen: soft, non-tender  She reports some vaginal spotting on 23, but reports nothing since then  Denies vaginal leaking of fluid  Pap/GC/CT collected today  Scheduled for ultrasound 23  Reviewed common discomforts of pregnancy in first trimester and warning signs  Advised to continue medications and return in 4 weeks  Current Outpatient Medications   Medication Instructions   • albuterol (ProAir HFA) 90 mcg/act inhaler 2 puffs, Inhalation, Every 6 hours PRN   • Prenatal Vit-Fe Fumarate-FA (Prenatal Vitamin) 27-0 8 MG TABS 1 tablet, Oral, Daily       Laboratory workup: initial OB labs (ordered 23)    Genetic Screening: scheduled for 23 w/MFM    Vaccinations: influenza (refused 23); Tdap (will offer after 27 weeks);  COVID-19 (recommended & declined 23)    Postpartum contraception: undecided    Fetal Ultrasounds:  23 (6w2d) EDC confirmed      G1 Problems (from 23 to present)     Problem Noted Resolved    Obesity affecting pregnancy 2023 by Walker Dubin, CRNP No    Overview Signed 2023  1:52 PM by Walker Dubin, CRNP     Pre-pregnant BMI=34 74  Needs early GDM screen - ordered 23  Serial growth scans         Asthma during pregnancy 2023 by Walker Dubin, CRNP No    Overview Signed 2023  1:57 PM by Walker Dubin, CRNP     Albuterol prn               Past Medical History:   Diagnosis Date   • Asthma      Past Surgical History:   Procedure Laterality Date   • NO PAST SURGERIES       OB History        1    Para   0    Term   0       0    AB   0    Living   0       SAB   0    IAB   0    Ectopic   0    Multiple   0    Live Births   0               Social History     Tobacco Use   • Smoking status: Never   • Smokeless tobacco: Never   Vaping Use • Vaping Use: Never used   Substance Use Topics   • Alcohol use: Not Currently     Comment: couple times/year, none since pregnant   • Drug use: Never

## 2023-01-31 NOTE — PATIENT INSTRUCTIONS
Jessica por ricketts confianza en nuestro equipo  Bernardino Cronin y agradecemos leoncio comentarios  Si recibe romy encuesta nuestra, tómese unos momentos para informarnos cómo estamos  TAPAN Guevara TRISH EL [place]  Llame a Will Danger al [phone] para cualquiera de los siguientes:    1  sangrado vaginal  2  Dolor abdominal sharona que no desaparece  3  Fiebre (más de 100 4 y no se patsy con Tylenol)  4  Vómitos persistentes que bermeo más de 24 horas  5  dolor de pecho  6  Dolor o ardor al orinar  7  Dolor de davin severo que no se resuelve con Tylenol  8  Visión borrosa o natasha puntos en ricketts visión  9  Hinchazón repentina de ricketts jensen o reji  10  Enrojecimiento, hinchazón o dolor en romy pierna  11  Un aumento de peso repentino en pocos días  12  Contar los movimientos fetales del bebé  (después de 28 semanas o el sexto mes de embarazo)  15  Romy pérdida de líquido acuoso de la vagina: puede ser un chorro, un goteo o romy humedad continua  14   Después de 20 semanas de embarazo, calambres rítmicos (más de 4 por hora) o menstruales olimpia dolor Clearnce Seller / Carvel Irma

## 2023-02-01 ENCOUNTER — APPOINTMENT (OUTPATIENT)
Dept: LAB | Facility: CLINIC | Age: 33
End: 2023-02-01

## 2023-02-01 DIAGNOSIS — Z34.91 PRENATAL CARE IN FIRST TRIMESTER: ICD-10-CM

## 2023-02-01 DIAGNOSIS — Z3A.08 8 WEEKS GESTATION OF PREGNANCY: ICD-10-CM

## 2023-02-01 LAB
BACTERIA UR CULT: NORMAL
GLUCOSE 1H P 50 G GLC PO SERPL-MCNC: 106 MG/DL (ref 40–134)
HIV 1+2 AB+HIV1 P24 AG SERPL QL IA: NORMAL
HIV 2 AB SERPL QL IA: NORMAL
HIV1 AB SERPL QL IA: NORMAL
HIV1 P24 AG SERPL QL IA: NORMAL
HPV HR 12 DNA CVX QL NAA+PROBE: POSITIVE
HPV16 DNA CVX QL NAA+PROBE: NEGATIVE
HPV18 DNA CVX QL NAA+PROBE: NEGATIVE
RPR SER QL: NORMAL

## 2023-02-03 LAB
HGB A MFR BLD: 2.4 % (ref 1.8–3.2)
HGB A MFR BLD: 97.6 % (ref 96.4–98.8)
HGB F MFR BLD: 0 % (ref 0–2)
HGB FRACT BLD-IMP: NORMAL
HGB S MFR BLD: 0 %

## 2023-02-06 LAB
CF COMMENT: NORMAL
CFTR MUT ANL BLD/T: NORMAL

## 2023-02-07 LAB
C TRACH DNA SPEC QL NAA+PROBE: NEGATIVE
N GONORRHOEA DNA SPEC QL NAA+PROBE: NEGATIVE

## 2023-02-08 LAB
CLINICAL INFO: NORMAL
COMMENTX: (FRAGILE X): NORMAL
ETHNIC BACKGROUND STATED: NORMAL
FMR1 GENE CGG RPT BLD/T QL: NORMAL
GENE MUT TESTED BLD/T: NORMAL
GENERAL COMMENTS:: NORMAL
LAB AP GYN PRIMARY INTERPRETATION: NORMAL
LAB DIRECTOR NAME PROVIDER: NORMAL
Lab: NORMAL
REASON FOR REFERRAL (NARRATIVE): NORMAL
REF LAB TEST METHOD: NORMAL
SL AMB DISCLAIMER: NORMAL
SL AMB GENETIC COUNSELOR: NORMAL
SMN1 GENE MUT ANL BLD/T: NORMAL
SPECIMEN SOURCE: NORMAL

## 2023-02-28 ENCOUNTER — TRANSCRIBE ORDERS (OUTPATIENT)
Dept: LAB | Facility: CLINIC | Age: 33
End: 2023-02-28

## 2023-02-28 ENCOUNTER — APPOINTMENT (OUTPATIENT)
Dept: LAB | Facility: CLINIC | Age: 33
End: 2023-02-28

## 2023-02-28 ENCOUNTER — ROUTINE PRENATAL (OUTPATIENT)
Dept: PERINATAL CARE | Facility: CLINIC | Age: 33
End: 2023-02-28

## 2023-02-28 VITALS
DIASTOLIC BLOOD PRESSURE: 78 MMHG | WEIGHT: 203.4 LBS | HEIGHT: 62 IN | SYSTOLIC BLOOD PRESSURE: 116 MMHG | HEART RATE: 76 BPM | BODY MASS INDEX: 37.43 KG/M2

## 2023-02-28 DIAGNOSIS — Z36.82 ENCOUNTER FOR NUCHAL TRANSLUCENCY TESTING: ICD-10-CM

## 2023-02-28 DIAGNOSIS — Z33.1 PREGNANT STATE, INCIDENTAL: Primary | ICD-10-CM

## 2023-02-28 DIAGNOSIS — Z3A.13 13 WEEKS GESTATION OF PREGNANCY: ICD-10-CM

## 2023-02-28 DIAGNOSIS — Z36.9 UNSPECIFIED ANTENATAL SCREENING: ICD-10-CM

## 2023-02-28 DIAGNOSIS — Z34.91 PRENATAL CARE IN FIRST TRIMESTER: ICD-10-CM

## 2023-02-28 DIAGNOSIS — O99.519 ASTHMA DURING PREGNANCY: Primary | ICD-10-CM

## 2023-02-28 DIAGNOSIS — J45.909 ASTHMA DURING PREGNANCY: Primary | ICD-10-CM

## 2023-02-28 DIAGNOSIS — O36.80X0 ENCOUNTER TO DETERMINE FETAL VIABILITY OF PREGNANCY, SINGLE OR UNSPECIFIED FETUS: ICD-10-CM

## 2023-02-28 DIAGNOSIS — Z3A.08 8 WEEKS GESTATION OF PREGNANCY: ICD-10-CM

## 2023-02-28 DIAGNOSIS — O46.8X1 SUBCHORIONIC HEMATOMA IN FIRST TRIMESTER, SINGLE OR UNSPECIFIED FETUS: ICD-10-CM

## 2023-02-28 DIAGNOSIS — O41.8X10 SUBCHORIONIC HEMATOMA IN FIRST TRIMESTER, SINGLE OR UNSPECIFIED FETUS: ICD-10-CM

## 2023-02-28 NOTE — PROGRESS NOTES
Patient chose to have [facility] Non-invasive Prenatal Screen with fetal sex  Patient given brochure and is aware [facility] will contact patients insurance and coordinate coverage  Patient made aware she will need to respond to text message or e-mail from [employer] within 2 business days or testing will be run through insurance  Patient informed text message will come from area code  "415"  Provided [address] [place] [address] # [phone] and web site : [email or web address]  Blood collection tubes labeled with patient identifiers (name, date of birth)    printed [facility] lab order and test kit given to patient to take to [facility] outpatient lab for blood collection  Copy of lab order scanned to Epic media  Maternal Fetal Medicine will have results in approximately 7-10 business days and will call patient or notify via [address]  Patient aware viewing lab result online will reveal fetal sex If ordered  Patient verbalized understanding of all instructions and no questions at this time

## 2023-02-28 NOTE — LETTER
March 7, 2023     Kendall Belcher, [address]  [place] [address] [place] [address]    Patient: Mono Oreilly   YOB: 1990   Date of Visit: 2/28/2023       Dear Dr Elda Viramontes:    Thank you for referring Patrick Palma to me for evaluation  Below are my notes for this consultation  If you have questions, please do not hesitate to call me  I look forward to following your patient along with you  Sincerely,        Titus Ray MD        CC: No Recipients  Titus Ray MD  3/7/2023  8:30 AM  Sign when Signing Visit  Please note, I communicated the patient utilizing  [id number]  Zeny Kumar presents today for a genetic screening ultrasound  This is her first pregnancy  She had first trimester bleeding which has since resolved  She has a history of asthma for which she utilizes albuterol infrequently  She has an allergy to penicillins and takes prenatal vitamins  Substance use history and family medical history are otherwise unremarkable  A review of systems is otherwise negative  We discussed the options for genetic screening, including but not limited to first trimester screening, second trimester screening, combined first and second trimester screening, noninvasive prenatal screening (NIPS) for patients at high risk and diagnostic screening through the use of CVS and amniocentesis  We discussed the risks and benefits of each approach including the sensitivities and false positive rates as well as the difference between a screening test and a diagnostic test  At the conclusion of our discussion the patient elected noninvasive prenatal testing utilizing the [facility] Non-invasive prenatal screening (NIPS) test  The patient had this blood work drawn in the office and the results should be available approximately 7-10 days after her blood draw  Her results will be reported from [facility]  A subchorionic hematoma is identified today    We reviewed that a subchorionic hematoma is a common first-trimester ultrasound finding which may or may not be associated with vaginal bleeding  We reviewed that a subchorionic hematoma may be a risk factor for adverse pregnancy outcomes including miscarriage,  premature rupture membranes, or placental abruption  Most subchorionic hematomas resolve without significant impact of the pregnancy  There is a particularly increased risk for increased pregnancy loss when the hematoma amounts to 25% or more of the volume of the gestational sac  Appropriate precautions were reviewed  Clinical follow-up is recommended at this gestational age  We discussed follow-up in detail and I recommend an anatomy ultrasound be scheduled for 20 weeks gestation  Thank you very much for allowing us to participate in the care of this very nice patient  Should you have any questions, please do not hesitate to contact me  Portions of the record may have been created with voice recognition software  Occasional wrong word or "sound a like" substitutions may have occurred due to the inherent limitations of voice recognition software  Read the chart carefully and recognize, using context, where substitutions have occurred

## 2023-03-01 ENCOUNTER — TELEPHONE (OUTPATIENT)
Dept: OBGYN CLINIC | Facility: CLINIC | Age: 33
End: 2023-03-01

## 2023-03-01 PROBLEM — Z3A.13 13 WEEKS GESTATION OF PREGNANCY: Status: ACTIVE | Noted: 2023-01-31

## 2023-03-01 PROBLEM — R87.619 ABNORMAL PAP SMEAR OF CERVIX: Status: ACTIVE | Noted: 2023-03-01

## 2023-03-07 PROBLEM — O46.8X1 SUBCHORIONIC HEMATOMA IN FIRST TRIMESTER: Status: ACTIVE | Noted: 2023-03-07

## 2023-03-07 PROBLEM — O41.8X10 SUBCHORIONIC HEMATOMA IN FIRST TRIMESTER: Status: ACTIVE | Noted: 2023-03-07

## 2023-03-07 NOTE — PROGRESS NOTES
Please note, I communicated the patient utilizing  [phone]  Viet Estrada presents today for a genetic screening ultrasound  This is her first pregnancy  She had first trimester bleeding which has since resolved  She has a history of asthma for which she utilizes albuterol infrequently  She has an allergy to penicillins and takes prenatal vitamins  Substance use history and family medical history are otherwise unremarkable  A review of systems is otherwise negative  We discussed the options for genetic screening, including but not limited to first trimester screening, second trimester screening, combined first and second trimester screening, noninvasive prenatal screening (NIPS) for patients at high risk and diagnostic screening through the use of CVS and amniocentesis  We discussed the risks and benefits of each approach including the sensitivities and false positive rates as well as the difference between a screening test and a diagnostic test  At the conclusion of our discussion the patient elected noninvasive prenatal testing utilizing the [facility] Non-invasive prenatal screening (NIPS) test  The patient had this blood work drawn in the office and the results should be available approximately 7-10 days after her blood draw  Her results will be reported from [facility] [place] [place]  A subchorionic hematoma is identified today  We reviewed that a subchorionic hematoma is a common first-trimester ultrasound finding which may or may not be associated with vaginal bleeding  We reviewed that a subchorionic hematoma may be a risk factor for adverse pregnancy outcomes including miscarriage,  premature rupture membranes, or placental abruption  Most subchorionic hematomas resolve without significant impact of the pregnancy  There is a particularly increased risk for increased pregnancy loss when the hematoma amounts to 25% or more of the volume of the gestational sac   Appropriate precautions were reviewed  Clinical follow-up is recommended at this gestational age  We discussed follow-up in detail and I recommend an anatomy ultrasound be scheduled for 20 weeks gestation  Thank you very much for allowing us to participate in the care of this very nice patient  Should you have any questions, please do not hesitate to contact me  Portions of the record may have been created with voice recognition software  Occasional wrong word or "sound a like" substitutions may have occurred due to the inherent limitations of voice recognition software  Read the chart carefully and recognize, using context, where substitutions have occurred

## 2023-03-10 ENCOUNTER — TELEPHONE (OUTPATIENT)
Facility: HOSPITAL | Age: 33
End: 2023-03-10

## 2023-03-10 NOTE — TELEPHONE ENCOUNTER
----- Message from Evi Easley MD sent at 3/9/2023  1:11 PM EST -----  I have reviewed the results of the NIPS which are low risk  Please call patient and notify her of reassuring results if she has not viewed on MyChart  Please ensure she is notified of recommendation of MSAFP to be ordered and followed up through her primary Obstetrician's office  Thank you

## 2023-03-10 NOTE — TELEPHONE ENCOUNTER
Used [employer] [place]   ID # [id number] Stephon Reveal to leave M for patient with results of her [facility] test, gender not provided  Patient instructed on MSAFP being ordered by OB and timing of test  Patient instructed to contact the nurse line with any questions

## 2023-03-29 ENCOUNTER — ROUTINE PRENATAL (OUTPATIENT)
Dept: OBGYN CLINIC | Facility: CLINIC | Age: 33
End: 2023-03-29

## 2023-03-29 VITALS
WEIGHT: 208.4 LBS | HEIGHT: 62 IN | DIASTOLIC BLOOD PRESSURE: 72 MMHG | BODY MASS INDEX: 38.35 KG/M2 | SYSTOLIC BLOOD PRESSURE: 108 MMHG | HEART RATE: 73 BPM

## 2023-03-29 DIAGNOSIS — Z34.92 PRENATAL CARE IN SECOND TRIMESTER: Primary | ICD-10-CM

## 2023-03-29 DIAGNOSIS — Z3A.17 17 WEEKS GESTATION OF PREGNANCY: ICD-10-CM

## 2023-03-29 NOTE — PROGRESS NOTES
Steffanie Bose presents today for routine OB visit at 17w1d  Blood Pressure: 108/72  Wt=94 5 kg (208 lb 6 4 oz); Body mass index is 38 12 kg/m² ; TWG=8 346 kg (18 lb 6 4 oz)  Fetal Heart Rate: 148  Abdomen: gravid, soft, non-tender  She reports asthma well-controlled without need for Albuterol inhaler for more than a month  Denies uterine contractions or persistent cramping  Denies vaginal bleeding or leaking of fluid  Reports fetal movement  Scheduled for ultrasound 4/18/23  Reviewed common discomforts of pregnancy in second trimester and warning signs  Advised to continue medications and return in 4 weeks  Current Outpatient Medications   Medication Instructions   • albuterol (ProAir HFA) 90 mcg/act inhaler 2 puffs, Inhalation, Every 6 hours PRN   • Prenatal Vit-Fe Fumarate-FA (Prenatal Vitamin) 27-0 8 MG TABS 1 tablet, Oral, Daily       Laboratory workup: initial OB labs (done 1/31/23)    Genetic Screening: NIPS negative, MSAFP ordered 3/29/23    Vaccinations: influenza (refused 1/24/23); Tdap (will offer after 27 weeks);  COVID-19 (recommended & declined 1/31/23)    Postpartum contraception: undecided    Fetal Ultrasounds:  1/12/23 (6w2d) EDC confirmed  2/28/23 (13w0d) [facility] noted      G1 Problems (from 01/12/23 to present)     Problem Noted Resolved    Abnormal Pap smear of cervix 3/1/2023 by Emily Gibbs MD No    Overview Addendum 3/29/2023  2:36 PM by Naman Lynn, [address] [place] [address] Drive with + other HRHPV  Needs repeat pap 1/2024         Obesity affecting pregnancy 1/31/2023 by TAPAN Swift No    Overview Addendum 3/29/2023  2:37 PM by TAPAN Swift     Pre-pregnant BMI=34 74  Needs early GDM screen - done, normal   Serial growth scans         Asthma during pregnancy 1/31/2023 by TAPAN Swift No    Overview Signed 1/31/2023  1:57 PM by TAPAN Swift     Albuterol prn

## 2023-03-29 NOTE — PATIENT INSTRUCTIONS
Jessica por ricketts confianza en nuestro equipo  [facility] y agradecemos leoncio comentarios  Si recibe romy encuesta nuestra, tómese unos momentos para informarnos cómo estamos  TAPAN [place] [facility] [place] al [phone] para cualquiera de los siguientes:    1  sangrado vaginal  2  Dolor abdominal sharona que no desaparece  3  Fiebre (más de 100 4 y no se patsy con Tylenol)  4  Vómitos persistentes que bermeo más de 24 horas  5  dolor de pecho  6  Dolor o ardor al orinar  7  Dolor de davin severo que no se resuelve con Tylenol  8  Visión borrosa o natasha puntos en ricketts visión  9  Hinchazón repentina de ricketts jensen o reji  10  Enrojecimiento, hinchazón o dolor en romy pierna  11  Un aumento de peso repentino en pocos días  12  Contar los movimientos fetales del bebé  (después de 28 semanas o el sexto mes de embarazo)  15  Romy pérdida de líquido acuoso de la vagina: puede ser un chorro, un goteo o romy humedad continua  14   Después de 20 semanas de embarazo, calambres rítmicos (más de 4 por hora) o menstruales olimpia dolor Lee Ann Brice / Rose Quivers

## 2023-04-25 PROBLEM — Z3A.21 21 WEEKS GESTATION OF PREGNANCY: Status: ACTIVE | Noted: 2023-01-31

## 2023-04-25 PROBLEM — Z34.90 PRENATAL CARE: Status: ACTIVE | Noted: 2023-04-25

## 2023-04-25 NOTE — PROGRESS NOTES
[address] [facility]  [id number]  1990        ASSESSMENT/PLAN:  Problem List        Respiratory    Asthma during pregnancy    Overview     Albuterol prn            Other    Obesity affecting pregnancy    Overview     Pre-pregnant BMI=34 74  Needs early GDM screen - done, normal   Serial growth scans         21 weeks gestation of pregnancy    Abnormal Pap smear of cervix    Overview     NILM with + other HRHPV  Needs repeat pap 2024         Prenatal care    Overview     • Labs  o Pap smear NILM, HPV positive, GC/CT negative  o Prenatal panel wnl  o 28w labs [ ],   o Early 1 hour GTT was wnl, repeat 1 and 3-hour GTT at 28 wks [ ]  • Vaccines:  o Tdap vaccine: [ ] complete at 28 wks  • Genetic screening: NIPS normal, MSAFP ordered  • Contraception: [ ]  • Ultrasounds: Level I US completed, Level II US patient needs to schedule  • RTC in 4 weeks          Other Visit Diagnoses     Encounter for prenatal care    -  Primary            Subjective: 33 y o  Neil Carias 21w0d here for prenatal visit  She denies contractions  She denies leakage of fluid and vaginal bleeding  She endorses good fetal movement  Objective:  Pre- Vitals    Flowsheet Row Most Recent Value   Prenatal Assessment    Fetal Heart Rate 159   Fundal Height (cm) 21 cm   Prenatal Vitals    Blood Pressure 123/80   Weight - Scale 96 7 kg (213 lb 3 2 oz)   Urine Albumin/Glucose    Dilation/Effacement/Station    Vaginal Drainage    Draining Fluid No   Edema    LLE Edema None   RLE Edema None   Facial Edema None           Pregnancy Plan:  Pregnancy: Elio Noe  Fetal sex: Male     Delivery Plans  Deliver by GA (weeks): 42  Planned delivery method: Vaginal  Planned delivery location: [facility] L&D  Planned anesthesia: None  Acceptable blood products:  All     Post-Delivery Plans  Feeding intentions: Non-human milk substitute  Circumcision requested: Provider Performed      General: Well appearing, no distress  Respiratory: Unlabored breathing  Cardiovascular: Regular rate  Abdomen: Soft, gravid, nontender  Fundal Height: Appropriate for gestational age  Extremities: Warm and well perfused  Non tender          D/w Dr Comfort Medina MD  Obstetrics & Gynecology, PGY1

## 2023-04-25 NOTE — PATIENT INSTRUCTIONS
If you are experiencing painful contractions, loss of fluids, vaginal bleeding, or decreased fetal movement, please call our office during business hours  If our office is closed, call [phone] to talk to the triage nurse, and ask to speak to OBGYN doctor on call prior to proceeding to [facility] 2nd floor [address] 3rd floor of [facility]  We have a resident doctor on call at both [facility] 24 hours a day

## 2023-04-26 ENCOUNTER — ROUTINE PRENATAL (OUTPATIENT)
Dept: OBGYN CLINIC | Facility: CLINIC | Age: 33
End: 2023-04-26

## 2023-04-26 VITALS
HEIGHT: 62 IN | HEART RATE: 76 BPM | SYSTOLIC BLOOD PRESSURE: 123 MMHG | BODY MASS INDEX: 39.23 KG/M2 | WEIGHT: 213.2 LBS | DIASTOLIC BLOOD PRESSURE: 80 MMHG

## 2023-04-26 DIAGNOSIS — Z34.90 ENCOUNTER FOR PRENATAL CARE: Primary | ICD-10-CM

## 2023-04-27 ENCOUNTER — ROUTINE PRENATAL (OUTPATIENT)
Dept: PERINATAL CARE | Facility: OTHER | Age: 33
End: 2023-04-27

## 2023-04-27 VITALS
SYSTOLIC BLOOD PRESSURE: 118 MMHG | DIASTOLIC BLOOD PRESSURE: 66 MMHG | HEIGHT: 62 IN | BODY MASS INDEX: 39.23 KG/M2 | HEART RATE: 74 BPM | WEIGHT: 213.2 LBS

## 2023-04-27 DIAGNOSIS — O99.212 OBESITY AFFECTING PREGNANCY IN SECOND TRIMESTER: Primary | ICD-10-CM

## 2023-04-27 DIAGNOSIS — Z36.86 ENCOUNTER FOR ANTENATAL SCREENING FOR CERVICAL LENGTH: ICD-10-CM

## 2023-04-27 DIAGNOSIS — Z3A.21 21 WEEKS GESTATION OF PREGNANCY: ICD-10-CM

## 2023-04-27 DIAGNOSIS — Z36.3 ENCOUNTER FOR ANTENATAL SCREENING FOR MALFORMATION: ICD-10-CM

## 2023-04-27 NOTE — PATIENT INSTRUCTIONS
Thank you for choosing us for your  care today  If you have any questions about your ultrasound or care, please do not hesitate to contact us or your primary obstetrician  Some general instructions for your pregnancy are:    Protect against coronavirus: get vaccinated - pregnant women are increased risk of severe COVID  Notify your primary care doctor if you have any symptoms  Exercise: Aim for 22 minutes per day (150 minutes per week) of regular exercise  Walking is great! Nutrition: aim for calcium-rich and iron-rich foods as well as healthy sources of protein  Learn about Preeclampsia: preeclampsia is a common, serious high blood pressure complication in pregnancy  A blood pressure of 914MYPV (systolic or top number) or 97JWOE (diastolic or bottom number) is not normal and needs evaluation by your doctor  Aspirin is sometimes prescribed in early pregnancy to prevent preeclampsia in women with risk factors - ask your obstetrician if you should be on this medication  If you smoke, try to reduce how many cigarettes you smoke or try to quit completely  Do not vape  Other warning signs to watch out for in pregnancy or postpartum: chest pain, obstructed breathing or shortness of breath, seizures, thoughts of hurting yourself or your baby, bleeding, a painful or swollen leg, fever, or headache (see AWHONN POST-BIRTH Warning Signs campaign)  If these happen call 911  Itching is also not normal in pregnancy and if you experience this, especially over your hands and feet, potentially worse at night, notify your doctors

## 2023-04-27 NOTE — PROGRESS NOTES
"[address]: Ms Whitney Reyes was seen today for anatomic survey and cervical length screening ultrasound  See ultrasound report under \"OB Procedures\" tab     Please don't hesitate to contact our office with any concerns or questions   -Monroe Rocha MD      "

## 2023-04-27 NOTE — PROGRESS NOTES
Ultrasound Probe Disinfection    A transvaginal ultrasound was performed  Prior to use, disinfection was performed with High Level Disinfection Process (Trophon)  Probe serial number F2: [id number] was used        Iris Guo  04/27/23  10:48 AM

## 2023-05-25 ENCOUNTER — ROUTINE PRENATAL (OUTPATIENT)
Dept: OBGYN CLINIC | Facility: CLINIC | Age: 33
End: 2023-05-25

## 2023-05-25 VITALS
WEIGHT: 219.2 LBS | HEART RATE: 81 BPM | BODY MASS INDEX: 40.09 KG/M2 | SYSTOLIC BLOOD PRESSURE: 123 MMHG | DIASTOLIC BLOOD PRESSURE: 75 MMHG

## 2023-05-25 DIAGNOSIS — J45.909 ASTHMA DURING PREGNANCY: ICD-10-CM

## 2023-05-25 DIAGNOSIS — Z3A.25 25 WEEKS GESTATION OF PREGNANCY: ICD-10-CM

## 2023-05-25 DIAGNOSIS — O99.519 ASTHMA DURING PREGNANCY: ICD-10-CM

## 2023-05-25 DIAGNOSIS — Z34.92 PRENATAL CARE IN SECOND TRIMESTER: Primary | ICD-10-CM

## 2023-05-25 RX ORDER — FLUTICASONE PROPIONATE 220 UG/1
2 AEROSOL, METERED RESPIRATORY (INHALATION) 2 TIMES DAILY
Qty: 12 G | Refills: 5 | Status: SHIPPED | OUTPATIENT
Start: 2023-05-25

## 2023-05-25 NOTE — PATIENT INSTRUCTIONS
Jessica por ricketts confianza en nuestro equipo  Radha Cordial y agradecemos leoncio comentarios  Si recibe radha encuesta nuestra, tómese unos momentos para informarnos cómo estamos  Sinceramente,  TAPAN Ramey       WARNING SIGNS DURING PREGNANCY  Call our office at [phone] for any of the followin  Vaginal bleeding  2  Sharp abdominal pain that does not go away  3  Fever (more than 100 4 and is not relieved by Tylenol)  4  Persistent vomiting lasting greater than 24 hours  5  Chest pain   6  Pain or burning when you urinate  7  Severe headache that doesn't resolve with Tylenol  8  Blurred vision or seeing spots in your vision  9  Sudden swelling of your face or hands  10  Redness, swelling or pain in a leg  11  A sudden weight gain in just a few days  12  Decrease in your baby's movement (after 28 weeks or the 6th month of pregnancy)  13  A loss of watery fluid from your vagina - can be a gush, a trickle or continuous wetness  14   After 20 weeks of pregnancy, rhythmic cramping (greater than 4 per hour) or menstrual like low/pelvic pain

## 2023-05-25 NOTE — PROGRESS NOTES
Shantell Loco presents today for routine OB visit at 25w2d  Blood Pressure: 123/75  Wt=99 4 kg (219 lb 3 2 oz); Body mass index is 40 09 kg/m² ; TWG=13 2 kg (29 lb 3 2 oz)  Fetal Heart Rate: 155; Fundal Height (cm): 28 cm  Abdomen: gravid, soft, non-tender  She reports need for Albuterol on a daily basis  Given Rx Flovent and instruction for administration  Denies uterine contractions  Denies vaginal bleeding or leaking of fluid  Reports adequate fetal movement of at least 10 movements in 2 hours once daily  Scheduled for ultrasound 7/12/23  Reviewed premature labor precautions and fetal kick counts  Advised to continue medications and return in 3 weeks  Current Outpatient Medications   Medication Instructions   • albuterol (ProAir HFA) 90 mcg/act inhaler 2 puffs, Inhalation, Every 6 hours PRN   • fluticasone (Flovent HFA) 220 mcg/act inhaler 2 puffs, Inhalation, 2 times daily, Rinse mouth after use  • Prenatal Vit-Fe Fumarate-FA (Prenatal Vitamin) 27-0 8 MG TABS 1 tablet, Oral, Daily       Laboratory workup: initial OB labs (done 1/31/23)    Genetic Screening: NIPS negative, MSAFP not done    Vaccinations: influenza (refused 1/24/23); Tdap (will offer after 27 weeks);  COVID-19 (recommended & declined 1/31/23)    Postpartum contraception: undecided    Fetal Ultrasounds:  1/12/23 (6w2d) EDC confirmed  2/28/23 (13w0d) NT WNL, [address] noted  4/27/23 (21w2d) no previa, kevin WNL w/missed views      G1 Problems (from 01/12/23 to present)     Problem Noted Resolved    Abnormal Pap smear of cervix 3/1/2023 by Rosa Sinclair MD No    Overview Addendum 3/29/2023  2:36 PM by TAPAN Tapia     NILM with + other HRHPV  Needs repeat pap 1/2024         Obesity affecting pregnancy 1/31/2023 by TAPAN Tapia No    Overview Addendum 5/25/2023  2:25 PM by TAPAN Tapia     Pre-pregnant BMI=34 74  Needs early GDM screen - done WNL  Serial growth scans         Asthma during pregnancy 1/31/2023 by TAPAN Gonzalez No    Overview Addendum 5/25/2023  2:34 PM by TAPAN Gonzalez     Albuterol prn  Flovent started 5/25/23

## 2023-06-14 ENCOUNTER — TELEPHONE (OUTPATIENT)
Dept: OBGYN CLINIC | Facility: CLINIC | Age: 33
End: 2023-06-14

## 2023-06-15 ENCOUNTER — ROUTINE PRENATAL (OUTPATIENT)
Dept: OBGYN CLINIC | Facility: CLINIC | Age: 33
End: 2023-06-15

## 2023-06-15 VITALS
BODY MASS INDEX: 41.3 KG/M2 | HEIGHT: 62 IN | WEIGHT: 224.4 LBS | SYSTOLIC BLOOD PRESSURE: 119 MMHG | HEART RATE: 74 BPM | DIASTOLIC BLOOD PRESSURE: 79 MMHG

## 2023-06-15 DIAGNOSIS — Z34.93 PRENATAL CARE, THIRD TRIMESTER: Primary | ICD-10-CM

## 2023-06-15 DIAGNOSIS — Z3A.28 28 WEEKS GESTATION OF PREGNANCY: ICD-10-CM

## 2023-06-15 DIAGNOSIS — R87.619 ABNORMAL CERVICAL PAPANICOLAOU SMEAR, UNSPECIFIED ABNORMAL PAP FINDING: ICD-10-CM

## 2023-06-15 DIAGNOSIS — O99.519 ASTHMA DURING PREGNANCY: ICD-10-CM

## 2023-06-15 DIAGNOSIS — O99.213 OBESITY AFFECTING PREGNANCY IN THIRD TRIMESTER: ICD-10-CM

## 2023-06-15 DIAGNOSIS — J45.909 ASTHMA DURING PREGNANCY: ICD-10-CM

## 2023-06-15 PROCEDURE — 99213 OFFICE O/P EST LOW 20 MIN: CPT | Performed by: OBSTETRICS & GYNECOLOGY

## 2023-06-15 RX ORDER — ALBUTEROL SULFATE 90 UG/1
2 AEROSOL, METERED RESPIRATORY (INHALATION) EVERY 6 HOURS PRN
Qty: 8.5 G | Refills: 2 | Status: SHIPPED | OUTPATIENT
Start: 2023-06-15

## 2023-06-15 RX ORDER — FLUTICASONE PROPIONATE 220 UG/1
2 AEROSOL, METERED RESPIRATORY (INHALATION) 2 TIMES DAILY
Qty: 12 G | Refills: 5 | Status: SHIPPED | OUTPATIENT
Start: 2023-06-15

## 2023-06-15 RX ORDER — PNV NO.95/FERROUS FUM/FOLIC AC 28MG-0.8MG
1 TABLET ORAL DAILY
Qty: 180 TABLET | Refills: 1 | Status: SHIPPED | OUTPATIENT
Start: 2023-06-15

## 2023-06-15 NOTE — PROGRESS NOTES
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 ([id number])  29 y o   28w2d who presents for routine prenatal visit  She has no obstetric complaints, including pelvic pain, contractions, vaginal bleeding, loss of fluid, or decreased fetal movement  Objective:  Pre-Soni Vitals    Flowsheet Row Most Recent Value   Prenatal Assessment    Fetal Heart Rate 145   Fundal Height (cm) 28 cm   Movement Present   Prenatal Vitals    Blood Pressure 119/79   Weight - Scale 102 kg (224 lb 6 4 oz)   Urine Albumin/Glucose    Dilation/Effacement/Station    Vaginal Drainage    Edema         TWG: 15 6 kg (34 lb 6 4 oz)    Gen: no acute distress, nonlabored breathing  Cardio: RRR, S1/S2 normal   Lungs: CTAB, good effort   Abd: soft, nontender  Gravid uterus  Fundal Height (cm): 28 cm  Fetal Heart Rate: 145    Assessment and Plan:  IUP @ 28w2d     Problem List        Respiratory    Asthma during pregnancy    Overview     Albuterol prn  Flovent started 23            Other    Obesity affecting pregnancy    Overview     Pre-pregnant BMI=34 74  TWG: 15 6 kg (34 lb 6 4 oz)   Needs early GDM screen - done WNL  Serial growth scans         28 weeks gestation of pregnancy    Overview     Continue PNV   Delivery consent 6/15/23   Declined tdap 6/15 visit, states \"next visit\" - continue to discuss   28 week labs ordered   [ ] Contraception:          Abnormal Pap smear of cervix    Overview     NILM with + other HRHPV  Needs repeat pap 2024         Prenatal care, third trimester        Pregnancy: Bradkarol Darling  Fetal sex: Male     Delivery Plans  Deliver by GA (weeks): 42  Planned delivery method: Vaginal  Planned delivery location: [facility]  Planned anesthesia: None  Acceptable blood products:  All     Post-Delivery Plans  Feeding intentions: Non-human milk substitute  Circumcision requested: Provider Performed    Chago Teresa MD  PGY-4, OBGYN  6/15/2023        "

## 2023-06-29 ENCOUNTER — ROUTINE PRENATAL (OUTPATIENT)
Dept: OBGYN CLINIC | Facility: CLINIC | Age: 33
End: 2023-06-29

## 2023-06-29 VITALS
HEART RATE: 80 BPM | BODY MASS INDEX: 41.37 KG/M2 | WEIGHT: 226.2 LBS | SYSTOLIC BLOOD PRESSURE: 128 MMHG | DIASTOLIC BLOOD PRESSURE: 64 MMHG

## 2023-06-29 DIAGNOSIS — Z34.93 PRENATAL CARE IN THIRD TRIMESTER: Primary | ICD-10-CM

## 2023-06-29 DIAGNOSIS — Z3A.30 30 WEEKS GESTATION OF PREGNANCY: ICD-10-CM

## 2023-06-29 PROCEDURE — 90471 IMMUNIZATION ADMIN: CPT | Performed by: NURSE PRACTITIONER

## 2023-06-29 PROCEDURE — 90715 TDAP VACCINE 7 YRS/> IM: CPT | Performed by: NURSE PRACTITIONER

## 2023-06-29 PROCEDURE — 99213 OFFICE O/P EST LOW 20 MIN: CPT | Performed by: NURSE PRACTITIONER

## 2023-06-29 NOTE — PROGRESS NOTES
Brad Nichole presents today for routine OB visit at 30w2d  Blood Pressure: 128/64  Uk=146 kg (226 lb 3 2 oz); Body mass index is 41 37 kg/m² ; TWG=16 4 kg (36 lb 3 2 oz)  Fetal Heart Rate: 142; Fundal Height (cm): 32 cm  Abdomen: gravid, soft, non-tender  She reports no complaints  Denies uterine contractions  Denies vaginal bleeding or leaking of fluid  Reports adequate fetal movement of at least 10 movements in 2 hours once daily  Scheduled for ultrasound 7/12/23  Reviewed premature labor precautions and fetal kick counts  Advised to continue medications and return in 2 weeks  Current Outpatient Medications   Medication Instructions   • albuterol (ProAir HFA) 90 mcg/act inhaler 2 puffs, Inhalation, Every 6 hours PRN   • fluticasone (Flovent HFA) 220 mcg/act inhaler 2 puffs, Inhalation, 2 times daily, Rinse mouth after use  • Prenatal Vit-Fe Fumarate-FA (Prenatal Vitamin) 27-0 8 MG TABS 1 tablet, Oral, Daily       Laboratory workup: initial OB labs (done 1/31/23); 28 week labs (ordered 6/29/23)    Genetic Screening: NIPS negative, MSAFP not done    Vaccinations: influenza (refused 1/24/23);  Tdap (given 6/29/23); COVID-19 (recommended & declined 1/31/23)    Postpartum contraception: undecided    Fetal Ultrasounds:  1/12/23 (6w2d) EDC confirmed  2/28/23 (13w0d) NT WNL, [facility] noted  4/27/23 (21w2d) no previa, kevin WNL w/missed views      G1 Problems (from 01/12/23 to present)     Problem Noted Resolved    Abnormal Pap smear of cervix 3/1/2023 by Eri Cortez MD No    Overview Addendum 3/29/2023  2:36 PM by TAPAN Pradhan     NILM with + other HRHPV  Needs repeat pap 1/2024         Obesity affecting pregnancy 1/31/2023 by TAPAN Pradhan No    Overview Addendum 6/29/2023 10:07 AM by TAPAN Pradhan     Pre-pregnant BMI=34 74  Needs early GDM screen - done WNL  Serial growth scans         Asthma during pregnancy 1/31/2023 by TAPAN Pradhan No    Overview Addendum 5/25/2023  2:34 PM by TAPAN Holguin     Albuterol prn  Flovent started 5/25/23

## 2023-06-29 NOTE — PATIENT INSTRUCTIONS
Jessica por ricketts confianza en nuestro equipo  Bob Zhou y agradecemos leoncio comentarios  Si recibe radha encuesta nuestra, tómese unos momentos para informarnos cómo estamos  TAPAN Hu       El Tercer Trimestre  (28-42 semanas)   RICKETTS BEBÉ   ·        ricketts bebé chupa ricketts dedo ahora! ·        ricketts bebé puede oír voces y responder al tacto    habla con [place]!!   ·        el cerebro de ricketts bebé crece y se desarrolla más en los últimos 2 meses de embarazo   ·        Katharyn Pott y [place] del bebé son Bessy Finesse y flexibles para que quepan por el canal del parto   ·        los movimientos del bebé cambian hacia el final del embarazo porque hay menos espacio para patear y estiramientos en tu vientre   ·        los pulmones del bebé no están completamente desarrollados y totalmente preparados para respirar por leoncio propios hasta el último 3-4 semanas antes de ricketts fecha de vencimiento     TU CUERPO   ·        ricketts vientre está creciendo mucho ahora   ·        será más difícil dormir berna de noche o ser tan activos olimpia eres generalmente   ·        puede sudar más de lo habitual   ·        serás más desequilibrado    tenga cuidado de no caer! ·        Usted puede desarrollar hemorroides (qué pueden ser dolorosas y hacen difícil sentarse)   ·        los dos últimos meses del embarazo puede ser muy incómodos con indira de [place], indira de Katharyn Pott y ardor de estómago   ·        Puedes empezar a tener contracciones    mientras son irregulares y Gela de 5 por hora, esto es radha parte normal de ricketts cuerpo a punto de tener un bebé   ·        el eileen uterino puede empezar a dilatar y abrir [place]    para estar listo para el parto   ·        Usted puede encontrarse que necesitan hacer orinar muy a menudo    porque el bebé está presionando mucho la vejiga   ·        Usted puede quedarse corta de respiracion más rápido de lo duc          CUENTAS DEL RETROCESO FETAL    En el tercer trimestre (después de 28 semanas de gestación) deben realizar patada fetal cuentas cada día  Smith bebé debe moverse al menos 10 veces en 2 horas jermey un tiempo Vesta, radha vez al día  Elegir el atime del día cuando el bebé es [place]  Trate de hacerlo a la misma hora cada día  Entrar en radha posición cómoda y luego escribir el tiempo que tu bebé se mueve blaise  Cuenta cada movimiento hasta que el bebé se mueve 10 veces  Estos movimientos incluyen patadas, puñetazos, codazos, revolotea o rollos  [place] puede tardar entre 5 minutos a 2 horas  Anote el tiempo que sientes movimiento 10 del bebé  Si ha pasado 2 horas y tu bebé no se ha movido al menos 10 veces, usted debe llamar a la oficina de inmediato  Qian Jonny Ramsey [address] a [phone]:  Raza Inks que llamar inmediatamente si tengo incluso radha pequeña cantidad de LIQUIDO de mi vagina, con o sin contracciones  * Tengo que llamar si estoy SANGRADO de mi vagina  * Tengo que llamar si tengo COLICOS que continúa después de beber 2 ó 3 vasos de agua y [place] en mi lado jeremy radha hora y que se siente olimpia que estoy teniendo un período  * Tengo que llamar si siento CONTRACCIONES más de 4 veces en radha hora quando siento que mi bouchra se mantiene dura después de que trato de beber 2-3 vasos del agua y [place] en mi lado jeremy radha hora  * Tengo que llamar si frances un cambio de mi FLUJO vaginal    * Tengo que llamar si siento la [place] P[place] que siente que el bebé aprieta en mi vagina y dura más de [place]  * Tengo que llamar si tengo DOLOR BAJO DE LA ESPALDA que es nueva y está cerca de mi cóccix  Puede entrar y salir varias veces jeremy radha hora o quedarse allí constantemente  LA PRE-ECLAMPSIA    ¿Qué es? La preeclampsia es radha enfermedad grave que puede ocurrir jeremy el embarazo se relaciona con la presión arterial nayana  Le puede pasar a cualquier doroteo  ¿Por qué Yes importarme?  [address] preeclampsia tienen riesgos graves pueden incluir convulsiones, trazo, daños [employer], nacimiento prematuro de ricketts bebé  En los [facility], puede causar la muerte de la madre y ricketts bebé  ¿Qué lashonda pagar Yasmine Riling? Signos y síntomas de la preeclampsia pueden incluir:   * Inflamación severa reji de la jensen o las   * Dolor de davin todavía presente después de acacia tomado Tylenol   * Riccardo manchas o cambios en [place]   * Dolor en la parte superior del abdomen o hombro   * [place] náuseas y vómitos (en la segunda mitad del embarazo)   * Aumento de peso repentino   * Dificultad para respirar     ¿Qué lashonda hacer? Si usted experimenta alguno de los síntomas de la preeclampsia, comuníquese con ricketts proveedor de [facility]  Encontrar la preeclampsia temprana es importante para usted y ricketts bebé  Livia Fluke al [phone]            LACTANCIA MATERNA     BENEFICIOS PARA LOS BEBÉS   ·       sistemas inmunológicos más rigoberto (menos alergias, eczema, asma y cáncer infantil)   ·       [de-identified] diarrea y estreñimiento u otras enfermedades GI   ·       menos resfriados e infecciones del oído   ·       mejor visión y dientes (menos cavidades)   ·       mejora el IQ   ·       menores tasas de diabetes y obesidad en la infancia     BENEFICIOS PARA LAS MADRES   ·        promueve la pérdida de peso más rápida después del parto   ·        angeli riesgo para la depresión postparto   ·        angeli riesgo para los cánceres Jesse, útero y ovario   ·        angeli riesgo para la osteoporosis en desarrollo con la edad   ·        siempre es más fácil que fórmula - correcto, limpio, y la temperatura adecuada   ·        menos costosa que la fórmula es gratis!!!     CLAVES PARA KAITY LACTANCIA EXITOSA   ·        mantener bebé piel a piel hasta después de la primera alimentación evento   ·        tener a bebé en ricketts cuarto con usted jeremy ricketts estadía en el hospital después del parto   ·        evitar cualquier botella de alimentación (a menos que sea médicamente necesario)   ·        limitar el uso de chupones y pañales   ·        Pida ayuda si usted está teniendo problemas    consultores de lactancia (que se especializan en la lactancia) están disponibles para ayudarle a   ·        radha dieta saludable para mamá    comiendo radha variedad de alimentos y raciones con moderación     COSAS QUE DEBES SABER SOBRE LA LACTANCIA   ·        mayoría de los medicamentos se considera compatible con la lactancia materna por [address] [place] [address] Lever consultarlo con ricketts médico o en lactancia antes de ayala un medicamento nuevo    para estar seguro es seguro   ·        alcohol (cerveza, vino, licor) puede transmitirse de la madre al bebé a través de la [place]    un ocasional, social bebida es considerado aceptable por [address]    más que eso deben evitarse   ·        la lactancia materna es NO es un método para evitar embarazo   ·        nicotina (cigarrillos) puede pasar de la madre al bebé a través de la [place]    sin embargo, para las [employer], es aún más saludable para amamantar que use la fórmula   ·        cafeína debería limitarse a 1-3 tazas por día    incluye café, refrescos, bebidas energéticas           MASAJE EN LA JOSE PERINEAL O VAGINAL    Que puedo hacer ahora para reducir las probabilidades de desgarro jeremy el parto? Masaje alrededor del orificio de la vagina realizado por usted misma (o por ricketts barber)  El masaje en esta jose, ya sea antes del parto o jeremy la segunda etapa del parto, [place] reducir la probabilidad de desgarro perineal jeremy el parto  Asimismo, el uso de compresas tibias en el perineo jeremy la etapa expulsiva del parto puede reducir la pravedad del desparro  Earth sucedera jeremy la etapa expulsiva del parto  En casa tambien puede reducir las probabilidades de sufrir lesiones durnate el parto de con masajes en la jose perineal     Betsey Portillo?   [address] embarazadas a partir de, [address], las 34 semanas de Annel  Cuando? Usted o ricketts barber deben hacer masajes en la radha vaginal trish 5 a 10 minutos de 1 a 4 veces por semana  [place]? Use radha mezcla de agua y aceite de [place], rajeev u garcia con 1 o 2 dedos (debbie la comodidad)  Inserte los dedos en la vagina entre 3 y 5cm  Aplique presion general hacia abajo y [facility] costados trish 5 a [address] y las 9 horas, de 1 a 4 veces por semana  SENALES TRISH EL EMBARAZO  Llame a nuestra oficina al [phone] para cualquiera de los siguientes:    1  Sangrado vaginal  2  Dolor abdominal sharona que no desaparece  3  Fiebre (más de 100 4 y no se patsy con Tylenol)  4  Vómitos persistentes que bermeo más de 24 horas  5  dolor de pecho  6  Dolor o ardor al orinar  7  Dolor de davin severo que no se resuelve con Tylenol  8  Visión borrosa o natasha puntos en ricketts visión  9  Hinchazón repentina de ricketts jensen o reji  10  Enrojecimiento, hinchazón o dolor en radha pierna  11  Un aumento de peso repentino en pocos días  12  Contar los movimientos fetales del bebé  (después de 28 semanas o el sexto mes de embarazo)  15  Radha pérdida de líquido acuoso de la vagina: puede ser un chorro, un goteo o radha humedad continua  14  Después de 20 semanas de embarazo, calambres rítmicos (más de 4 por hora) o menstruales olimpia dolor bajo / Linus Meager TRIHS EL EMBARAZO    TDAP  La tos ferina (o tos [place]) puede ser grave para cualquier persona, daryl para ricketts recién nacido, puede ser mortal  Hasta 20 recién nacidos mueren cada año en los [place] debido a la tos [place]   Aproximadamente la mitad de los bebés menores de 1 año de edad que contraen tos ferina necesitan tratamiento en el hospital  Cuanto más joven es el bebé cuando él o bhaskar son la tos [place]enu [place] probable es que él o bhaskar tendrá que ser tratado en un hospital  Cuando usted recibe la vacuna contra la tos ferina (Tdap) jeremy ricketts embarazo, ricketts cuerpo creará anticuerpos protectores y algunos de ellos pasan a ricketts bebé antes de nacer  Estos anticuerpos pueden ayudar a proteger a ricketts bebé contraigan la tos ferina hasta que tienen edad suficiente para recibir la vacuna ellos mismos (generalmente alrededor de 6 meses de edad)  INFLUENZA  Cambios en las funciones inmunológica, del corazón y pulmón jeremy el embarazo te hacen más susceptible a padecer seriamente enfermo de la gripe  Coger la gripe también aumenta las posibilidades de problemas graves para ricketts bebé en desarrollo, incluyendo la entrega y el trabajo de [place]  Se recomienda que todas las mujeres que están embarazadas jeremy la temporada de gripe deben recibir radha vacuna contra la influenza

## 2023-07-12 ENCOUNTER — ULTRASOUND (OUTPATIENT)
Age: 33
End: 2023-07-12
Payer: COMMERCIAL

## 2023-07-12 VITALS
BODY MASS INDEX: 41.62 KG/M2 | WEIGHT: 226.2 LBS | DIASTOLIC BLOOD PRESSURE: 70 MMHG | HEART RATE: 86 BPM | SYSTOLIC BLOOD PRESSURE: 120 MMHG | HEIGHT: 62 IN

## 2023-07-12 DIAGNOSIS — O99.213 OBESITY AFFECTING PREGNANCY IN THIRD TRIMESTER: Primary | ICD-10-CM

## 2023-07-12 DIAGNOSIS — O36.5990 FETAL GROWTH RESTRICTION ANTEPARTUM: ICD-10-CM

## 2023-07-12 DIAGNOSIS — Z3A.32 32 WEEKS GESTATION OF PREGNANCY: ICD-10-CM

## 2023-07-12 PROCEDURE — 76820 UMBILICAL ARTERY ECHO: CPT | Performed by: OBSTETRICS & GYNECOLOGY

## 2023-07-12 PROCEDURE — 59025 FETAL NON-STRESS TEST: CPT | Performed by: OBSTETRICS & GYNECOLOGY

## 2023-07-12 PROCEDURE — 99214 OFFICE O/P EST MOD 30 MIN: CPT | Performed by: OBSTETRICS & GYNECOLOGY

## 2023-07-12 PROCEDURE — 76816 OB US FOLLOW-UP PER FETUS: CPT | Performed by: OBSTETRICS & GYNECOLOGY

## 2023-07-12 NOTE — LETTER
July 12, 2023     Nishi Dawson, [address] [address]    Patient: Demar Esparza   YOB: 1990   Date of Visit: 7/12/2023       Dear Ms Adilson Bahena:    Thank you for referring Chago Champion to me for evaluation. Below are my notes for this consultation. If you have questions, please do not hesitate to call me. I look forward to following your patient along with you. Sincerely,        Helder Leblanc MD        CC: No Recipients    Helder Leblanc MD  7/12/2023  4:32 PM  Sign when Signing Visit  A fetal ultrasound and NST were completed. See Ob procedures in Epic for an interpretation and recommendations. Do not hesitate to contact us in [facility] if you have questions. Vilma Ron MD, [address] [facility]  Maternal Fetal Medicine

## 2023-07-12 NOTE — PROGRESS NOTES
A fetal ultrasound and NST were completed. See Ob procedures in Epic for an interpretation and recommendations. Do not hesitate to contact us in [facility] if you have questions. Jaswinder Villalba MD, [address] [facility]  Maternal Fetal Medicine

## 2023-07-12 NOTE — PROGRESS NOTES
Non-Stress Testing:    Non-Stress test, equipment, procedure, and expected outcomes reviewed. Reviewed fetal kick counts and when to call OB. [facility] Verified patient understanding of fetal kick counts with teach back method. Patient reports feeling daily fetal movements. Patient has no questions or concerns.  NST reviewed by Dr. Lincoln Newsome Subjective   Patient ID: Michelle is a 59 year old female.    Chief Complaint   Patient presents with   • Follow-up       HPI    59 yr old female with PMH of HTN, HLD, Fibromyalgia, Bipolar disorder, osteopenia who is presenting today for follow up on account of BP control monitoring.    #Hypertension  -BP in the office: 126/60  -Patient's home SBP's is in the 120-140s  -Endorse adherence to medications  -Reports she lives alone but unsure if she snores  Plan  -Continue current regimen lisinopril hydrochlorothiazide 10-25 mg daily  -Continue home daily BP monitoring and bring BP readings during next visit  -Continue low salt and DASH diet  -Counseled on the need to lose weight  -Follow up in 6 months    #Hyperlipidemia  -Lipid panel (2/3/23): Chol: 234, TGL: 107, HDL: 51, LDL: 162  -Endorse adherence to medications Rosuvastatin 10 mg daily  -Patient is requesting to have her Lipid panel checked today  -Order lipid panel today    #Hx of Fibromyalgia  -Reports it was diagnosed years ago  -States she completed physical therapy last month  -Requesting for Flexeril for occasional pain and stiffness all over her body    #Hx of Osteopenia  -Endorse she takes Vitamin D supplements but not calcium supplements which is available at her home.    -Denies any recent falls/fractures  Plan  -Counseled on the need to continue taking vitamin D and start taking calcium supplements & foods rich in Vitamin D and calcium such as milk, fish, egg yolks, mushroom, vegetables  -Encouraged to start weightbearing exercises     Health maintenance:  Up to date with vaccinations except COVID vaccine  Up to date with health maintenance age appropriate screenings     Patient Counseling:  -Nutrition: Stressed importance of moderation in sodium/caffeine intake, saturated fat and cholesterol, caloric balance, sufficient intake of fresh fruits, vegetables, fiber, calcium, and iron  -Exercise: Stressed importance of exercise  -Substance abuse: Discussed  cessation/prevention of tobacco, alcohol, or drug use; driving or other dangerous activities under the influence; probability of treatment for abuse  -Sexuality: Discussed sexually transmitted diseases, partner selection, use of condoms, avoidance of unintended pregnancy and contraceptive alternatives  -Injury prevention: Discussed safety belts, safety helmets, smoke detector  -Dental health: Discussed importance of regular tooth brushing, flossing and dental visits  -Immunizations reviewed: No history of adverse reaction to immunizations    Patient's medications, allergies, past medical, surgical, social and family histories were reviewed and updated as appropriate.    Review of Systems   Constitutional: Negative for appetite change, chills, diaphoresis, fatigue and fever.   HENT: Negative for congestion and rhinorrhea.    Eyes: Negative for visual disturbance.   Respiratory: Negative for cough and shortness of breath.    Cardiovascular: Negative for chest pain, palpitations and leg swelling.   Gastrointestinal: Negative for abdominal pain, blood in stool, constipation, diarrhea, nausea and vomiting.   Genitourinary: Negative for dysuria and frequency.   Skin: Negative for color change and rash.   Neurological: Negative for dizziness and headaches.   Hematological: Negative for adenopathy.   Psychiatric/Behavioral: Negative for agitation and behavioral problems.       Past Medical History:   Diagnosis Date   • Acute pharyngitis 6/5/2014   • Ankle pain, left 3/4/2015   • Back pain 7/15/2014   • Bereavement due to life event 10/9/2017   • Bilateral plantar fasciitis 8/28/2015   • BRBPR (bright red blood per rectum) 4/4/2016   • Chest pain 3/1/2017   • Constipation 3/12/2013   • Degeneration of lumbar intervertebral disc 6/22/2022   • Dizziness 5/7/2019   • Dizziness 5/7/2019   • Essential (primary) hypertension    • Fatigue 7/8/2013   • Foraminal stenosis of lumbar region 10/26/2017   • Frequency of micturition  1/10/2013   • Heel spur 3/9/2015   • High cholesterol    • History of 2019 novel coronavirus disease (COVID-19) 11/27/2020   • History of blood transfusion 11/5/2019   • Lipoma of arm 7/25/2018   • Menopausal symptoms 10/28/2013   • Narrowing of intervertebral disc space 6/22/2022   • Onychomycosis 7/25/2018   • Panic attack 11/5/2019   • Pelvic muscle wasting 3/12/2013   • Pelvic pain in female 5/7/2019   • Pruritus ani 9/8/2018   • Puncture wound 9/9/2014   • Rectal bleed 6/5/2018   • Right calf pain 5/7/2019   • Screening for diabetes mellitus (DM) 5/18/2022   • Tarsal tunnel syndrome 8/28/2015   • Vaginal candidiasis 7/11/2012     Past Surgical History:   Procedure Laterality Date   • Anesth,surg lower abdomen       Current Outpatient Medications   Medication Sig Dispense Refill   • cyclobenzaprine (FLEXERIL) 5 MG tablet Take 1 tablet by mouth 3 times daily as needed for Muscle spasms. 30 tablet 0   • ibuprofen (MOTRIN) 600 MG tablet Take 600 mg by mouth every 6 hours as needed.     • methocarbamol (ROBAXIN) 500 MG tablet Take by mouth 2 times daily as needed.     • rosuvastatin (CRESTOR) 10 MG tablet Take 1 tablet by mouth daily. 90 tablet 3   • hydrOXYzine (ATARAX) 25 MG tablet Take 1 tablet by mouth every 8 hours as needed for Anxiety. 30 tablet 0   • celecoxib (CeleBREX) 200 MG capsule Take 1 capsule by mouth daily. 30 capsule 1   • Cyanocobalamin (B-12) 500 MCG Tab Take 500 mcg by mouth daily. 90 tablet 3   • methocarbamol (ROBAXIN) 500 MG tablet Take 1 tablet by mouth 2 times daily as needed for Muscle spasms. 60 tablet 0   • lisinopril-hydroCHLOROthiazide (ZESTORETIC) 10-12.5 MG per tablet Take 1 tablet by mouth daily. 30 tablet 11   • albuterol 108 (90 Base) MCG/ACT inhaler Inhale 2 puffs into the lungs every 4 hours as needed for Shortness of Breath or Wheezing. 1 each 1   • meclizine (ANTIVERT) 12.5 MG tablet Take 1 tablet by mouth 3 times daily as needed for Dizziness or Nausea. 30 tablet 1   •  Cholecalciferol (Vitamin D3) 50 mcg (2,000 units) tablet        No current facility-administered medications for this visit.     Family History   Problem Relation Age of Onset   • Coronary Artery Disease Sister      Social History     Tobacco Use   • Smoking status: Never   • Smokeless tobacco: Never   Vaping Use   • Vaping Use: never used   Substance Use Topics   • Alcohol use: Not Currently   • Drug use: Not Currently     Allergies   Allergen Reactions   • Penicillins HIVES, RASH and Other (See Comments)       Objective   Vitals:    05/18/23 1240   BP: 126/60   Pulse: 67   Resp: 14   Temp: 97.8 °F (36.6 °C)     Physical Exam  Constitutional:       Appearance: She is obese.   HENT:      Head: Normocephalic and atraumatic.      Nose: Nose normal.      Mouth/Throat:      Mouth: Mucous membranes are moist.      Pharynx: Oropharynx is clear.   Eyes:      Conjunctiva/sclera: Conjunctivae normal.      Pupils: Pupils are equal, round, and reactive to light.   Cardiovascular:      Rate and Rhythm: Normal rate and regular rhythm.      Pulses: Normal pulses.      Heart sounds: Normal heart sounds.   Pulmonary:      Effort: Pulmonary effort is normal.      Breath sounds: Normal breath sounds.   Abdominal:      General: Bowel sounds are normal.      Palpations: Abdomen is soft.   Musculoskeletal:      Right lower leg: No edema.      Left lower leg: No edema.   Skin:     General: Skin is warm.      Capillary Refill: Capillary refill takes less than 2 seconds.   Neurological:      General: No focal deficit present.      Mental Status: She is alert and oriented to person, place, and time.   Psychiatric:         Mood and Affect: Mood normal.         Behavior: Behavior normal.         Assessment   Problem List Items Addressed This Visit        Cardiac and Vasculature    Hyperlipidemia     -Lipid panel (2/3/23): Chol: 234, TGL: 107, HDL: 51, LDL: 162  -Endorse adherence to medications Rosuvastatin 10 mg daily  -Patient is requesting  to have her Lipid panel checked today  -Will order lipid panel today           Relevant Orders    Lipid Panel With Reflex    Hypertension - Primary     -BP in the office: 126/60  -Patient's home SBP's is in the 120-140s  -Endorse adherence to medications  -Reports she lives alone but unsure if she snores  Plan  -Continue current regimen lisinopril hydrochlorothiazide 10-25 mg daily  -Continue home daily BP monitoring and bring BP readings during next visit  -Continue low salt and DASH diet  -Counseled on the need to lose weight by engaging in at least 150 minutes of moderate aerobic activity or 75 minutes of vigorous aerobic activity for a week or a combination of 3 to.  Aim for at least 30 minutes of aerobic activity most days of the week.  Aerobic activities include bicycling, climbing stairs, dancing, gardening such as mowing the lawn and raking leaves, jogging, swimming and walking  -Follow up in 6 months             Endocrine and Metabolic    Obesity (BMI 30-39.9)     BMI: 31.55  Has lost about 2 pounds since her last visit  Counseled on the importance of adopting healthy dietary and lifestyle modifications         Prediabetes     A1c: 6.0 (2/3/23)  Will repeat A1c today  Counseled on adopting healthy dietary and lifestyle modifications           Relevant Orders    Glycohemoglobin       Musculoskeletal and Injuries    Fibromyalgia     -Reports it was diagnosed years ago  -States she completed physical therapy last month  -Requesting for Flexeril for occasional pain and stiffness all over her body           Relevant Medications    cyclobenzaprine (FLEXERIL) 5 MG tablet    Osteopenia     -Endorse she takes Vitamin D supplements but not calcium supplements which is available at her home.    -Denies any recent falls/fractures  Plan  -Counseled on the need to continue taking vitamin D and start taking calcium supplements & foods rich in Vitamin D and calcium such as milk, fish, egg yolks, mushroom,  vegetables  -Encouraged to start weightbearing exercises            Discussed with Dr. Mckenzie Mendez MD, MPH  PGY-2, Internal Medicine   Available on [employer]

## 2023-07-13 ENCOUNTER — ROUTINE PRENATAL (OUTPATIENT)
Dept: OBGYN CLINIC | Facility: CLINIC | Age: 33
End: 2023-07-13

## 2023-07-13 VITALS
BODY MASS INDEX: 41.3 KG/M2 | WEIGHT: 225.8 LBS | HEART RATE: 87 BPM | SYSTOLIC BLOOD PRESSURE: 118 MMHG | DIASTOLIC BLOOD PRESSURE: 77 MMHG

## 2023-07-13 DIAGNOSIS — O36.5990 FETAL GROWTH RESTRICTION ANTEPARTUM: Primary | ICD-10-CM

## 2023-07-13 RX ORDER — BETAMETHASONE SODIUM PHOSPHATE AND BETAMETHASONE ACETATE 3; 3 MG/ML; MG/ML
12 INJECTION, SUSPENSION INTRA-ARTICULAR; INTRALESIONAL; INTRAMUSCULAR; SOFT TISSUE EVERY 24 HOURS
Status: COMPLETED | OUTPATIENT
Start: 2023-07-13 | End: 2023-07-14

## 2023-07-13 RX ADMIN — BETAMETHASONE SODIUM PHOSPHATE AND BETAMETHASONE ACETATE 12 MG: 3; 3 INJECTION, SUSPENSION INTRA-ARTICULAR; INTRALESIONAL; INTRAMUSCULAR; SOFT TISSUE at 11:16

## 2023-07-13 NOTE — PROGRESS NOTES
[address] [facility] [address]  [id number]  1990        A/P:  Problem List Items Addressed This Visit        Other    IUGR - Primary      growth with AC <1%tile, EFW <3%tile   Received first dose of BTM today, will return tomorrow for second dose  To begin twice weekly NST, weekly CHIO and dopplers   Repeat Growth in  2 weeks                    S: 29 y.o. Dominga Miguel Angel here for PN visit. She denies contractions. She denies leakage of fluid and vaginal bleeding. She reports good fetal movement. Santo Case was diagnosed with fetal growth restriction with elevated umbilical dopplers on . We discussed the administration of betamethasone for fetal lung maturity and beginning twice weekly  surveillance. She is in agreement. O:  Vitals:    23 1047   BP: 118/77   Pulse: 87     Physical Exam  Constitutional:       General: She is not in acute distress. Appearance: Normal appearance. HENT:      Head: Normocephalic and atraumatic. Cardiovascular:      Rate and Rhythm: Normal rate. Pulmonary:      Effort: Pulmonary effort is normal.   Abdominal:      Palpations: Abdomen is soft. Tenderness: There is no abdominal tenderness. Comments: Gravid   Skin:     General: Skin is warm and dry. Neurological:      General: No focal deficit present. Mental Status: She is alert.          Fetal Heart Rate: 138  Fundal Height (cm): 32 cm    D/w Dr. Suzanne Ma MD  OB/GYN PGY-3

## 2023-07-14 ENCOUNTER — CLINICAL SUPPORT (OUTPATIENT)
Dept: OBGYN CLINIC | Facility: CLINIC | Age: 33
End: 2023-07-14

## 2023-07-14 VITALS
WEIGHT: 226.8 LBS | SYSTOLIC BLOOD PRESSURE: 113 MMHG | HEART RATE: 92 BPM | DIASTOLIC BLOOD PRESSURE: 76 MMHG | BODY MASS INDEX: 41.48 KG/M2

## 2023-07-14 DIAGNOSIS — O36.5920 POOR FETAL GROWTH AFFECTING MANAGEMENT OF MOTHER IN SECOND TRIMESTER, SINGLE OR UNSPECIFIED FETUS: ICD-10-CM

## 2023-07-14 RX ADMIN — BETAMETHASONE SODIUM PHOSPHATE AND BETAMETHASONE ACETATE 12 MG: 3; 3 INJECTION, SUSPENSION INTRA-ARTICULAR; INTRALESIONAL; INTRAMUSCULAR; SOFT TISSUE at 11:20

## 2023-07-14 NOTE — PROGRESS NOTES
Pt here for Betamethasone given in the right arm           NDC#[id number]  LOT#[id number]  EXP#3/31/2024

## 2023-07-16 NOTE — ASSESSMENT & PLAN NOTE
Continue albuterol PRN   Continue Flovent   Avoid Hemabate at time of delivery if uterine atony occurs

## 2023-07-16 NOTE — ASSESSMENT & PLAN NOTE
Analgesia: undecided  Tdap: was given 6/29/23  Ultrasound: last growth 7/12 A/C <1%tile   Labs: 28 week labs ordered but not completed, encouraged to do so   Delivery consent signed 6/15/23  Next PNV 2 weeks

## 2023-07-16 NOTE — ASSESSMENT & PLAN NOTE
7/12 growth with AC <1%tile, EFW <3%tile   Received first dose of BTM today, will return tomorrow for second dose  To begin twice weekly NST, weekly CHIO and dopplers   Repeat Growth in  2 weeks

## 2023-07-20 ENCOUNTER — ROUTINE PRENATAL (OUTPATIENT)
Dept: OBGYN CLINIC | Facility: CLINIC | Age: 33
End: 2023-07-20

## 2023-07-20 VITALS
BODY MASS INDEX: 40.93 KG/M2 | SYSTOLIC BLOOD PRESSURE: 126 MMHG | WEIGHT: 223.8 LBS | HEART RATE: 82 BPM | DIASTOLIC BLOOD PRESSURE: 70 MMHG

## 2023-07-20 DIAGNOSIS — O36.5930 POOR FETAL GROWTH AFFECTING MANAGEMENT OF MOTHER IN THIRD TRIMESTER, SINGLE OR UNSPECIFIED FETUS: ICD-10-CM

## 2023-07-20 DIAGNOSIS — Z34.93 PRENATAL CARE IN THIRD TRIMESTER: Primary | ICD-10-CM

## 2023-07-20 DIAGNOSIS — Z3A.33 33 WEEKS GESTATION OF PREGNANCY: ICD-10-CM

## 2023-07-20 PROCEDURE — 99213 OFFICE O/P EST LOW 20 MIN: CPT | Performed by: NURSE PRACTITIONER

## 2023-07-20 PROCEDURE — 59025 FETAL NON-STRESS TEST: CPT | Performed by: NURSE PRACTITIONER

## 2023-07-20 NOTE — PROGRESS NOTES
Candance Butts presents today for routine OB visit at 33w2d. Blood Pressure: 126/70  Us=912 kg (223 lb 12.8 oz); Body mass index is 40.93 kg/m².; TWG=15.3 kg (33 lb 12.8 oz)  Fetal Heart Rate: 145  Abdomen: gravid, soft, non-tender. She reports asthma well-controlled. Denies uterine contractions. Denies vaginal bleeding or leaking of fluid. Reports adequate fetal movement of at least 10 movements in 2 hours once daily. Scheduled for ultrasound 7/26/23. Reminded patient to have 28 week labwork performed. States she will go tomorrow. Reviewed premature labor precautions and fetal kick counts. Advised to continue medications and return in 4 days for next NST. Current Outpatient Medications   Medication Instructions   • albuterol (ProAir HFA) 90 mcg/act inhaler 2 puffs, Inhalation, Every 6 hours PRN   • fluticasone (Flovent HFA) 220 mcg/act inhaler 2 puffs, Inhalation, 2 times daily, Rinse mouth after use. • Prenatal Vit-Fe Fumarate-FA (Prenatal Vitamin) 27-0.8 MG TABS 1 tablet, Oral, Daily       Laboratory workup: initial OB labs (done 1/31/23); 28 week labs (ordered 6/29/23)    Genetic Screening: NIPS negative, MSAFP not done    Vaccinations: influenza (refused 1/24/23);  Tdap (given 6/29/23); COVID-19 (recommended & declined 1/31/23)    Postpartum contraception: undecided    Fetal Ultrasounds:  1/12/23 (6w2d) EDC confirmed  2/28/23 (13w0d) NT WNL, [facility] noted  4/27/23 (21w2d) no previa, kevin WNL w/missed views  7/12/23 (32w1d) EFW<3%, AC<1%, CHIO=12.9cm, UA dopps elevated, kevin WNL & complete      G1 Problems (from 01/12/23 to present)     Problem Noted Resolved    IUGR 7/12/2023 by Nilsa Weathers MD No    Overview Addendum 7/20/2023  1:16 PM by TAPAN Galindo     Noted @94 weeks (with elevated UA dopplers)  Needs Betamethasone - given 7/13/23 & 7/14/23  Needs twice weekly NST, weekly UA dopplers/CHIO - in progress alternating b/w MFM and OB  Needs delivery @37 weeks or sooner if indicated         Abnormal Pap smear of cervix 3/1/2023 by Lucas Finley MD No    Overview Addendum 3/29/2023  2:36 PM by TAPAN Flores     NILM with + other HRHPV  Needs repeat pap 1/2024         Obesity affecting pregnancy 1/31/2023 by TAPAN Flores No    Overview Addendum 6/29/2023 10:07 AM by TAPAN Flores     Pre-pregnant BMI=34.74  Needs early GDM screen - done WNL  Serial growth scans         Asthma during pregnancy 1/31/2023 by TAPAN Flores No    Overview Addendum 5/25/2023  2:34 PM by TAPAN Flores     Albuterol prn  Flovent started 5/25/23

## 2023-07-20 NOTE — PATIENT INSTRUCTIONS
Jessica por montana confianza en nuestro equipo. Gradie Idamay y agradecemos leoncio comentarios. Si recibe radha encuesta nuestra, tómese unos momentos para informarnos cómo estamos. TAPAN Duke       El Tercer Trimestre  (28-42 semanas)   MONTANA BEBÉ   ·        montana bebé chupa montana dedo ahora! ·        montana bebé puede oír voces y responder al tacto. .. habla con Cherene Flavio!!   ·        el cerebro de montana bebé crece y se desarrolla más en los últimos 2 meses de embarazo   ·        Orie Elissa y [place] del bebé son Ritu Mona y flexibles para que quepan por el canal del parto   ·        los movimientos del bebé cambian hacia el final del embarazo porque hay menos espacio para patear y estiramientos en tu vientre   ·        los pulmones del bebé no están completamente desarrollados y totalmente preparados para respirar por leoncio propios hasta el último 3-4 semanas antes de montana fecha de vencimiento     TU CUERPO   ·        montana vientre está creciendo mucho ahora   ·        será más difícil dormir berna de noche o ser tan activos olimpia eres generalmente   ·        puede sudar más de lo habitual   ·        serás más desequilibrado. .. tenga cuidado de no caer! ·        Usted puede desarrollar hemorroides (qué pueden ser dolorosas y hacen difícil sentarse)   ·        los dos últimos meses del embarazo puede ser muy incómodos con indira de [place], indira de Orie Elissa y ardor de estómago   ·        Puedes empezar a tener contracciones. .. mientras son irregulares y [place] de 5 por hora, esto es radha parte normal de montana cuerpo a punto de tener un bebé   ·        el eileen uterino puede empezar a dilatar y abrir [place]. .. para estar listo para el parto   ·        Usted puede encontrarse que necesitan hacer orinar muy a menudo. .. porque el bebé está presionando mucho la vejiga   ·        Usted puede quedarse corta de respiracion más rápido de lo duc          CUENTAS DEL RETROCESO FETAL    En el tercer trimestre (después de 28 semanas de gestación) deben realizar patada fetal cuentas cada día. [place] bebé debe moverse al menos 10 veces en 2 horas jeremy un tiempo [place], radha vez al día. Elegir el atime del día cuando el bebé es [place]. Trate de hacerlo a la misma hora cada día. Entrar en radha posición cómoda y luego escribir el tiempo que tu bebé se mueve blaise. Cuenta cada movimiento hasta que el bebé se mueve 10 veces. Estos movimientos incluyen patadas, puñetazos, codazos, revolotea o rollos. [place] puede tardar entre 5 minutos a 2 horas. Anote el tiempo que sientes movimiento 10 del bebé. Si ha pasado 2 horas y tu bebé no se ha movido al menos 10 veces, usted debe llamar a la oficina de inmediato. [address] [place] [address] a [phone]:  Paula Days que llamar inmediatamente si tengo incluso radha pequeña cantidad de LIQUIDO de mi vagina, con o sin contracciones. * Tengo que llamar si estoy SANGRADO de mi vagina. * Tengo que llamar si tengo COLICOS que continúa después de beber 2 ó 3 vasos de agua y [place] en mi lado jeremy radha hora y que se siente olimpia que estoy teniendo un período. * Tengo que llamar si siento CONTRACCIONES más de 4 veces en radha hora quando siento que mi bouchra se mantiene dura después de que trato de beber 2-3 vasos del agua y [place] en mi lado jeremy radha hora. * Tengo que llamar si frances un cambio de mi FLUJO vaginal.   * Tengo que llamar si siento la [place] que siente que el bebé aprieta en mi vagina y dura más de [place]. * Tengo que llamar si tengo DOLOR BAJO DE LA ESPALDA que es nueva y está cerca de mi cóccix. Puede entrar y salir varias veces jeremy radha hora o quedarse allí constantemente. LA PRE-ECLAMPSIA    ¿Qué es? La preeclampsia es radha enfermedad grave que puede ocurrir jeremy el embarazo se relaciona con la presión arterial nayana. Le puede pasar a cualquier doroteo. ¿Por qué Yes importarme?  [place] preeclampsia tienen riesgos graves pueden incluir convulsiones, trazo, daños [employer], nacimiento prematuro de ricketts bebé. En los [employer], puede causar la muerte de la madre y ricketts bebé. ¿Qué lashonda pagar Jerelene Peek? Signos y síntomas de la preeclampsia pueden incluir:   * Inflamación severa reji de la jensen o las   * Dolor de davin todavía presente después de acacia tomado Tylenol   * Riccardo manchas o cambios en [place]   * Dolor en la parte superior del abdomen o hombro   * [place] náuseas y vómitos (en la segunda mitad del embarazo)   * Aumento de peso repentino   * Dificultad para respirar     ¿Qué lashonda hacer? Si usted experimenta alguno de los síntomas de la preeclampsia, comuníquese con ricketts proveedor de [facility]. Encontrar la preeclampsia temprana es importante para usted y ricketts bebé. Sejal Snowden al [phone].           LACTANCIA MATERNA     BENEFICIOS PARA LOS BEBÉS   ·       sistemas inmunológicos más rigoberto (menos alergias, eczema, asma y cáncer infantil)   ·       menos diarrea y estreñimiento u otras enfermedades GI   ·       menos resfriados e infecciones del oído   ·       mejor visión y dientes (menos cavidades)   ·       mejora el IQ   ·       menores tasas de diabetes y obesidad en la infancia     BENEFICIOS PARA LAS MADRES   ·        promueve la pérdida de peso más rápida después del parto   ·        angeli riesgo para la depresión postparto   ·        angeli riesgo para los cánceres de [place], útero y ovario   ·        angeli riesgo para la osteoporosis en desarrollo con la edad   ·        siempre es más fácil que fórmula - correcto, limpio, y la temperatura adecuada   ·        menos costosa que la fórmula es gratis!!!     CLAVES PARA KAITY LACTANCIA EXITOSA   ·        mantener bebé piel a piel hasta después de la primera alimentación evento   ·        tener a bebé en ricketts cuarto con usted jeremy ricketts estadía en el hospital después del parto   ·        evitar cualquier botella de alimentación (a menos que sea médicamente necesario)   ·        limitar el uso de chupones y pañales   ·        Pida ayuda si usted está teniendo problemas. .. consultores de lactancia (que se especializan en la lactancia) están disponibles para ayudarle a   ·        radha dieta saludable para mamá. .. comiendo radha variedad de alimentos y raciones con moderación     COSAS QUE DEBES SABER SOBRE LA LACTANCIA   ·        mayoría de los medicamentos se considera compatible con la lactancia materna por [address] Catrina Radha consultarlo con ricketts médico o en lactancia antes de ayala un medicamento nuevo. .. para estar seguro es seguro   ·        alcohol (cerveza, vino, licor) puede transmitirse de la madre al bebé a través de la [place]. .. un ocasional, social bebida es considerado aceptable por [facility]. .. más que eso deben evitarse   ·        la lactancia materna es NO es un método para evitar embarazo   ·        nicotina (cigarrillos) puede pasar de la madre al bebé a través de la [place]. .. sin embargo, para las [employer], es aún más saludable para amamantar que use la fórmula   ·        cafeína debería limitarse a 1-3 tazas por día. .. incluye café, refrescos, bebidas energéticas           MASAJE EN LA JOSE PERINEAL O VAGINAL    Que puedo hacer ahora para reducir las probabilidades de desgarro jeremy el parto? Masaje alrededor del orificio de la vagina realizado por usted misma (o por ricketts barber). El masaje en esta jose, ya sea antes del parto o jeremy la segunda etapa del parto, [place] reducir la probabilidad de desgarro perineal jereym el parto. Asimismo, el uso de compresas tibias en el perineo jeremy la etapa expulsiva del parto puede reducir la pravedad del desparro. [place] sucedera jeremy la etapa expulsiva del parto. En casa tambien puede reducir las probabilidades de sufrir lesiones durnate el parto de con masajes en la jose perineal.    Avni Silver   [facility] embarazadas a partir de, [facility], las 34 semanas de [place]. Cuando? Usted o ricketts barber deben hacer masajes en la radha vaginal trish 5 a 10 minutos de 1 a 4 veces por semana. Adwoa? Use radha mezcla de agua y aceite de [place], rajeev u garcia con 1 o 2 dedos (debbie la comodidad). Inserte los dedos en la vagina entre 3 y 5cm. Aplique presion general hacia abajo y [employer] costados trish 5 a [address] 3 y las 9 horas, de 1 a 4 veces por semana. SENALES TRISH EL EMBARAZO  Llame a nuestra oficina al [phone] para cualquiera de los siguientes:    1. Sangrado vaginal  2. Dolor abdominal sharona que no desaparece. 3. Fiebre (más de 100.4 y no se patsy con Tylenol)  4. Vómitos persistentes que bermeo más de 24 horas. 5. dolor de pecho  6. Dolor o ardor al orinar. 7. Dolor de davin severo que no se resuelve con Tylenol  8. Visión borrosa o natasha puntos en ricketts visión. 9. Hinchazón repentina de ricketts jensen o reji. 10. Enrojecimiento, hinchazón o dolor en radha pierna. 11. Un aumento de peso repentino en pocos días. 12. Contar los movimientos fetales del bebé. (después de 28 semanas o el sexto mes de embarazo)  15. Radha pérdida de líquido acuoso de la vagina: puede ser un chorro, un goteo o radha humedad continua  14. Después de 20 semanas de embarazo, calambres rítmicos (más de 4 por hora) o menstruales adwoa dolor bajo / Tia Severe TRISH EL EMBARAZO    TDAP  La tos ferina (o tos [place]) puede ser grave para cualquier persona, daryl para ricketts recién nacido, puede ser mortal. Hasta 20 recién nacidos mueren cada año en los [place] debido a la tos [place].  Aproximadamente la mitad de los bebés menores de 1 año de edad que contraen tos ferina necesitan tratamiento en el hospital. Cuanto más joven es el bebé cuando él o bhaskar son la tos Jessy Spear probable es que él o bhaskar tendrá que ser tratado en un hospital. Cuando usted recibe la vacuna contra la tos ferina (Tdap) jeremy ricketts embarazo, ricketts cuerpo creará anticuerpos protectores y algunos de ellos pasan a ricketts bebé antes de nacer. Estos anticuerpos pueden ayudar a proteger a ricketts bebé contraigan la tos ferina hasta que tienen edad suficiente para recibir la vacuna ellos mismos (generalmente alrededor de 6 meses de edad). INFLUENZA  Cambios en las funciones inmunológica, del corazón y pulmón jeremy el embarazo te hacen más susceptible a padecer seriamente enfermo de la gripe. Coger la gripe también aumenta las posibilidades de problemas graves para ricketts bebé en desarrollo, incluyendo la entrega y el trabajo de [place]. Se recomienda que todas las mujeres que están embarazadas jeremy la temporada de gripe deben recibir radha vacuna contra la influenza.

## 2023-07-24 ENCOUNTER — ROUTINE PRENATAL (OUTPATIENT)
Dept: OBGYN CLINIC | Facility: CLINIC | Age: 33
End: 2023-07-24

## 2023-07-24 VITALS
BODY MASS INDEX: 41.11 KG/M2 | DIASTOLIC BLOOD PRESSURE: 71 MMHG | HEART RATE: 89 BPM | WEIGHT: 223.4 LBS | SYSTOLIC BLOOD PRESSURE: 130 MMHG | HEIGHT: 62 IN

## 2023-07-24 DIAGNOSIS — O36.5930 POOR FETAL GROWTH AFFECTING MANAGEMENT OF MOTHER IN THIRD TRIMESTER, SINGLE OR UNSPECIFIED FETUS: ICD-10-CM

## 2023-07-24 DIAGNOSIS — Z3A.33 33 WEEKS GESTATION OF PREGNANCY: ICD-10-CM

## 2023-07-24 DIAGNOSIS — Z34.93 PRENATAL CARE IN THIRD TRIMESTER: Primary | ICD-10-CM

## 2023-07-24 PROCEDURE — 99213 OFFICE O/P EST LOW 20 MIN: CPT | Performed by: OBSTETRICS & GYNECOLOGY

## 2023-07-24 PROCEDURE — 59025 FETAL NON-STRESS TEST: CPT | Performed by: OBSTETRICS & GYNECOLOGY

## 2023-07-24 NOTE — PROGRESS NOTES
Candance Butts presents today for routine OB visit at 33w6d. Blood Pressure: 130/71  Ym=375 kg (223 lb 6.4 oz); Body mass index is 40.86 kg/m².; TWG=15.2 kg (33 lb 6.4 oz)   ; Abdomen: gravid, soft, non-tender. She reports no complaints. Denies uterine contractions. Denies vaginal bleeding or leaking of fluid. Reports adequate fetal movement of at least 10 movements in 2 hours once daily. Still needs to do labs- pt aware. NST reactive. Scheduled for ultrasound 07/27/23. Reviewed premature labor precautions and fetal kick counts. Advised to continue medications and return in 3 days. Current Outpatient Medications   Medication Instructions   • albuterol (ProAir HFA) 90 mcg/act inhaler 2 puffs, Inhalation, Every 6 hours PRN   • fluticasone (Flovent HFA) 220 mcg/act inhaler 2 puffs, Inhalation, 2 times daily, Rinse mouth after use.    • Prenatal Vit-Fe Fumarate-FA (Prenatal Vitamin) 27-0.8 MG TABS 1 tablet, Oral, Daily         G1 Problems (from 01/12/23 to present)     Problem Noted Resolved    IUGR 7/12/2023 by Nilsa Weathers MD No    Overview Addendum 7/20/2023  1:16 PM by TAPAN Galindo     Noted @08 weeks (with elevated UA dopplers)  Needs Betamethasone - given 7/13/23 & 7/14/23  Needs twice weekly NST, weekly UA dopplers/CHIO - in progress alternating b/w MFM and OB  Needs delivery @37 weeks or sooner if indicated         Abnormal Pap smear of cervix 3/1/2023 by Tristan Dickinson MD No    Overview Addendum 3/29/2023  2:36 PM by TAPAN Galindo     NILM with + other HRHPV  Needs repeat pap 1/2024         Obesity affecting pregnancy 1/31/2023 by TAPAN Galindo No    Overview Addendum 6/29/2023 10:07 AM by TAPAN Galindo     Pre-pregnant BMI=34.74  Needs early GDM screen - done WNL  Serial growth scans         Asthma during pregnancy 1/31/2023 by TAPAN Galindo No    Overview Addendum 5/25/2023  2:34 PM by TAPAN Galindo     Albkathieol prn  Flovent started 5/25/23

## 2023-07-26 NOTE — PROGRESS NOTES
Please refer to the [facility] ultrasound report in Ob Procedures for additional information regarding today's visit

## 2023-07-27 ENCOUNTER — ULTRASOUND (OUTPATIENT)
Age: 33
End: 2023-07-27
Payer: COMMERCIAL

## 2023-07-27 VITALS
HEIGHT: 62 IN | BODY MASS INDEX: 41.81 KG/M2 | HEART RATE: 99 BPM | DIASTOLIC BLOOD PRESSURE: 70 MMHG | WEIGHT: 227.2 LBS | SYSTOLIC BLOOD PRESSURE: 120 MMHG

## 2023-07-27 DIAGNOSIS — Z3A.34 34 WEEKS GESTATION OF PREGNANCY: Primary | ICD-10-CM

## 2023-07-27 DIAGNOSIS — O36.5930 INTRAUTERINE GROWTH RESTRICTION AFFECTING ANTEPARTUM CARE OF MOTHER IN THIRD TRIMESTER, SINGLE OR UNSPECIFIED FETUS: ICD-10-CM

## 2023-07-27 PROCEDURE — 76820 UMBILICAL ARTERY ECHO: CPT | Performed by: OBSTETRICS & GYNECOLOGY

## 2023-07-27 PROCEDURE — 76816 OB US FOLLOW-UP PER FETUS: CPT | Performed by: OBSTETRICS & GYNECOLOGY

## 2023-07-27 PROCEDURE — 76827 ECHO EXAM OF FETAL HEART: CPT | Performed by: OBSTETRICS & GYNECOLOGY

## 2023-07-27 PROCEDURE — 59025 FETAL NON-STRESS TEST: CPT | Performed by: OBSTETRICS & GYNECOLOGY

## 2023-07-27 NOTE — PROGRESS NOTES
Via  Madi:  Non-Stress Testing:    Non-Stress test, equipment, procedure, and expected outcomes explained. Reviewed fetal kick counts and when to call OB. Verified patient understanding of fetal kick counts with teach back method. Patient reports feeling daily fetal movements all day long baby active.

## 2023-07-27 NOTE — LETTER
July 27, 2023     Izaiah Shipley, [address] [address]    Patient: Kenan Alcantara   YOB: 1990   Date of Visit: 7/27/2023       Dear Dr. Jamila Mijares:    Thank you for referring Solitario Waters to me for evaluation. Below are my notes for this consultation. If you have questions, please do not hesitate to call me. I look forward to following your patient along with you.          Sincerely,        Sueann Ahumada, MD        CC: No Recipients    Sueann Ahumada, MD  7/26/2023  8:00 AM  Sign when Signing Visit  Please refer to the [facility] ultrasound report in Ob Procedures for additional information regarding today's visit

## 2023-07-31 ENCOUNTER — ROUTINE PRENATAL (OUTPATIENT)
Dept: OBGYN CLINIC | Facility: CLINIC | Age: 33
End: 2023-07-31

## 2023-07-31 VITALS
WEIGHT: 228 LBS | SYSTOLIC BLOOD PRESSURE: 133 MMHG | BODY MASS INDEX: 41.7 KG/M2 | HEART RATE: 86 BPM | DIASTOLIC BLOOD PRESSURE: 74 MMHG

## 2023-07-31 DIAGNOSIS — O36.5930 POOR FETAL GROWTH AFFECTING MANAGEMENT OF MOTHER IN THIRD TRIMESTER, SINGLE OR UNSPECIFIED FETUS: ICD-10-CM

## 2023-07-31 DIAGNOSIS — Z3A.34 34 WEEKS GESTATION OF PREGNANCY: ICD-10-CM

## 2023-07-31 DIAGNOSIS — Z34.93 PRENATAL CARE IN THIRD TRIMESTER: Primary | ICD-10-CM

## 2023-07-31 PROCEDURE — 59025 FETAL NON-STRESS TEST: CPT | Performed by: NURSE PRACTITIONER

## 2023-07-31 PROCEDURE — 99213 OFFICE O/P EST LOW 20 MIN: CPT | Performed by: NURSE PRACTITIONER

## 2023-07-31 NOTE — PROGRESS NOTES
Debby Lopez presents today for routine OB visit at 34w6d. Blood Pressure: 133/74  Qi=313 kg (228 lb); Body mass index is 41.7 kg/m².; TWG=17.2 kg (38 lb)  Fetal Heart Rate: 145; Fundal Height (cm): 33 cm   NST is reactive now. Abdomen: gravid, soft, non-tender. She reports no complaints. Denies uterine contractions. Denies vaginal bleeding or leaking of fluid. Reports adequate fetal movement of at least 10 movements in 2 hours once daily. Scheduled for ultrasound 8/2/23. States will have 28 week labwork performed tomorrow. Reviewed premature labor precautions and fetal kick counts. Advised to continue medications and return in 1 week. Current Outpatient Medications   Medication Instructions   • albuterol (ProAir HFA) 90 mcg/act inhaler 2 puffs, Inhalation, Every 6 hours PRN   • fluticasone (Flovent HFA) 220 mcg/act inhaler 2 puffs, Inhalation, 2 times daily, Rinse mouth after use. • Prenatal Vit-Fe Fumarate-FA (Prenatal Vitamin) 27-0.8 MG TABS 1 tablet, Oral, Daily       Laboratory workup: initial OB labs (done 1/31/23); 28 week labs (ordered 6/29/23)    Genetic Screening: NIPS negative, MSAFP not done    Vaccinations: influenza (refused 1/24/23);  Tdap (given 6/29/23); COVID-19 (recommended & declined 1/31/23)    Postpartum contraception: undecided    Fetal Ultrasounds:  1/12/23 (6w2d) EDC confirmed  2/28/23 (13w0d) NT WNL, [facility] noted  4/27/23 (21w2d) no previa, kevin WNL w/missed views  7/12/23 (32w1d) EFW<3%, AC<1%, CHIO=12.9cm, UA dopps elevated, kevin WNL & complete  7/27/23 (34w2d) EFW<3%, AC=4%, CHIO=9.2cm, UA dopps WNL      G1 Problems (from 01/12/23 to present)     Problem Noted Resolved    IUGR 7/12/2023 by Lakisha Camacho MD No    Overview Addendum 7/31/2023  3:13 PM by TAPAN Flores     Noted @32 weeks (elevated UA dopplers @32 weeks, UA dopplers normal @34 weeks)  Needs Betamethasone - given 7/13/23 & 7/14/23  Needs twice weekly NST, weekly UA dopplers/CHIO - in progress alternating b/w MFM and OB  Needs delivery @37 weeks - scheduled for induction 8/15/23 @0800         Abnormal Pap smear of cervix 3/1/2023 by Mark Jarrell MD No    Overview Addendum 3/29/2023  2:36 PM by TAPAN Harman     NILM with + other HRHPV  Needs repeat pap 1/2024         Obesity affecting pregnancy 1/31/2023 by TAPAN Harman No    Overview Addendum 6/29/2023 10:07 AM by TAPAN Harman     Pre-pregnant BMI=34.74  Needs early GDM screen - done WNL  Serial growth scans         Asthma during pregnancy 1/31/2023 by TAPAN Harman No    Overview Addendum 5/25/2023  2:34 PM by TAPAN Harman     Albuterol prn  Flovent started 5/25/23

## 2023-07-31 NOTE — PATIENT INSTRUCTIONS
Jessica por ricketts confianza en nuestro equipo. Dana Marcellus y agradecemos leoncio comentarios. Si recibe radha encuesta nuestra, tómese unos momentos para informarnos cómo estamos. Bonner Carrel Ryan, CRNP       El Tercer Trimestre  (28-42 semanas)   RICKETTS BEBÉ   ·        ricketts bebé chupa ricketts dedo ahora! ·        ricketts bebé puede oír voces y responder al tacto. .. habla con Akira Rafi!!   ·        el cerebro de ricketts bebé crece y se desarrolla más en los últimos 2 meses de embarazo   ·        Glory Dues y [place] del bebé son [place] y flexibles para que quepan por el canal del parto   ·        los movimientos del bebé cambian hacia el final del embarazo porque hay menos espacio para patear y estiramientos en tu vientre   ·        los pulmones del bebé no están completamente desarrollados y totalmente preparados para respirar por leoncio propios hasta el último 3-4 semanas antes de ricketts fecha de vencimiento     TU CUERPO   ·        ricketts vientre está creciendo mucho ahora   ·        será más difícil dormir berna de noche o ser tan activos olimpia eres generalmente   ·        puede sudar más de lo habitual   ·        serás más desequilibrado. .. tenga cuidado de no caer! ·        Usted puede desarrollar hemorroides (qué pueden ser dolorosas y hacen difícil sentarse)   ·        los dos últimos meses del embarazo puede ser muy incómodos con indira de [place], indira de Glory Dues y ardor de estómago   ·        Puedes empezar a tener contracciones. .. mientras son irregulares y [place] de 5 por hora, esto es radha parte normal de ricketts cuerpo a punto de tener un bebé   ·        el eileen uterino puede empezar a dilatar y abrir [place]. .. para estar listo para el parto   ·        Usted puede encontrarse que necesitan hacer orinar muy a menudo. .. porque el bebé está presionando mucho la vejiga   ·        Usted puede quedarse corta de respiracion más rápido de lo duc          CUENTAS DEL RETROCESO FETAL    En el tercer trimestre (después de 28 semanas de gestación) deben realizar patada fetal cuentas cada día. [place] bebé debe moverse al menos 10 veces en 2 horas jeremy un tiempo [place], radha vez al día. Elegir el atime del día cuando el bebé es [place]. Trate de hacerlo a la misma hora cada día. Entrar en radha posición cómoda y luego escribir el tiempo que tu bebé se mueve blaise. Cuenta cada movimiento hasta que el bebé se mueve 10 veces. Estos movimientos incluyen patadas, puñetazos, codazos, revolotea o rollos. [place] puede tardar entre 5 minutos a 2 horas. Anote el tiempo que sientes movimiento 10 del bebé. Si ha pasado 2 horas y tu bebé no se ha movido al menos 10 veces, usted debe llamar a la oficina de inmediato. [address] [place] [address] a [phone]:  Oumar Sami que llamar inmediatamente si tengo incluso radha pequeña cantidad de LIQUIDO de mi vagina, con o sin contracciones. * Tengo que llamar si estoy SANGRADO de mi vagina. * Tengo que llamar si tengo COLICOS que continúa después de beber 2 ó 3 vasos de agua y [place] en mi lado jeremy radha hora y que se siente olimpia que estoy teniendo un período. * Tengo que llamar si siento CONTRACCIONES más de 4 veces en radha hora quando siento que mi bouchra se mantiene dura después de que trato de beber 2-3 vasos del agua y [place] en mi lado jeremy radha hora. * Tengo que llamar si frances un cambio de mi FLUJO vaginal.   * Tengo que llamar si siento la [place] que siente que el bebé aprieta en mi vagina y dura más de [place]. * Tengo que llamar si tengo DOLOR BAJO DE LA ESPALDA que es nueva y está cerca de mi cóccix. Puede entrar y salir varias veces jeremy radha hora o quedarse allí constantemente. LA PRE-ECLAMPSIA    ¿Qué es? La preeclampsia es radha enfermedad grave que puede ocurrir jeremy el embarazo se relaciona con la presión arterial nayana. Le puede pasar a cualquier doroteo. ¿Por qué Yes importarme?  [place] preeclampsia tienen riesgos graves pueden incluir convulsiones, trazo, daños [employer], nacimiento prematuro de ricketts bebé. En los [employer], puede causar la muerte de la madre y ricketts bebé. ¿Qué lashonda pagar Porfirio Goody? Signos y síntomas de la preeclampsia pueden incluir:   * Inflamación severa reji de la jensen o las   * Dolor de davin todavía presente después de acacia tomado Tylenol   * Riccardo manchas o cambios en [place]   * Dolor en la parte superior del abdomen o hombro   * [place] náuseas y vómitos (en la segunda mitad del embarazo)   * Aumento de peso repentino   * Dificultad para respirar     ¿Qué lashonda hacer? Si usted experimenta alguno de los síntomas de la preeclampsia, comuníquese con ricketts proveedor de [facility]. Encontrar la preeclampsia temprana es importante para usted y ricketts bebé. Ronald Sanches al [phone].           LACTANCIA MATERNA     BENEFICIOS PARA LOS BEBÉS   ·       sistemas inmunológicos más rigoberto (menos alergias, eczema, asma y cáncer infantil)   ·       menos diarrea y estreñimiento u otras enfermedades GI   ·       menos resfriados e infecciones del oído   ·       mejor visión y dientes (menos cavidades)   ·       mejora el IQ   ·       menores tasas de diabetes y obesidad en la infancia     BENEFICIOS PARA LAS MADRES   ·        promueve la pérdida de peso más rápida después del parto   ·        angeli riesgo para la depresión postparto   ·        angeli riesgo para los cánceres de [place], útero y ovario   ·        angeli riesgo para la osteoporosis en desarrollo con la edad   ·        siempre es más fácil que fórmula - correcto, limpio, y la temperatura adecuada   ·        menos costosa que la fórmula es gratis!!!     CLAVES PARA KAITY LACTANCIA EXITOSA   ·        mantener bebé piel a piel hasta después de la primera alimentación evento   ·        tener a bebé en ricketts cuarto con usted jeremy ricketts estadía en el hospital después del parto   ·        evitar cualquier botella de alimentación (a menos que sea médicamente necesario)   ·        limitar el uso de chupones y pañales   ·        Pida ayuda si usted está teniendo problemas. .. consultores de lactancia (que se especializan en la lactancia) están disponibles para ayudarle a   ·        radha dieta saludable para mamá. .. comiendo radha variedad de alimentos y raciones con moderación     COSAS QUE DEBES SABER SOBRE LA LACTANCIA   ·        mayoría de los medicamentos se considera compatible con la lactancia materna por [address] [place] consultarlo con ricketts médico o en lactancia antes de ayala un medicamento nuevo. .. para estar seguro es seguro   ·        alcohol (cerveza, vino, licor) puede transmitirse de la madre al bebé a través de la [place]. .. un ocasional, social bebida es considerado aceptable por [facility]. .. más que eso deben evitarse   ·        la lactancia materna es NO es un método para evitar embarazo   ·        nicotina (cigarrillos) puede pasar de la madre al bebé a través de la [place]. .. sin embargo, para las [employer], es aún más saludable para amamantar que use la fórmula   ·        cafeína debería limitarse a 1-3 tazas por día. .. incluye café, refrescos, bebidas energéticas           MASAJE EN LA JOSE PERINEAL O VAGINAL    Que puedo hacer ahora para reducir las probabilidades de desgarro jeremy el parto? Masaje alrededor del orificio de la vagina realizado por usted misma (o por ricketts barber). El masaje en esta jose, ya sea antes del parto o jeremy la segunda etapa del parto, [place] reducir la probabilidad de desgarro perineal jeremy el parto. Asimismo, el uso de compresas tibias en el perineo jeremy la etapa expulsiva del parto puede reducir la pravedad del desparro. [place] sucedera jeremy la etapa expulsiva del parto. En casa tambien puede reducir las probabilidades de sufrir lesiones durnate el parto de con masajes en la jose perineal.    Kayla Pedro?   [address] [place] [address] embarazadas a partir de, [address], las 34 semanas de [place]. Cuando? Usted o ricketts barber deben hacer masajes en la radha vaginal trish 5 a 10 minutos de 1 a 4 veces por semana. Adwoa? Use radha mezcla de agua y aceite de [place], rajeev u garcia con 1 o 2 dedos (debbie la comodidad). Inserte los dedos en la vagina entre 3 y 5cm. Aplique presion general hacia abajo y [employer] costados trish 5 a [address] 3 y las 9 horas, de 1 a 4 veces por semana. SENALES TRISH EL EMBARAZO  Llame a nuestra oficina al [phone] para cualquiera de los siguientes:    1. Sangrado vaginal  2. Dolor abdominal hsarona que no desaparece. 3. Fiebre (más de 100.4 y no se patsy con Tylenol)  4. Vómitos persistentes que bermeo más de 24 horas. 5. dolor de pecho  6. Dolor o ardor al orinar. 7. Dolor de davin severo que no se resuelve con Tylenol  8. Visión borrosa o natasha puntos en ricketts visión. 9. Hinchazón repentina de ricketts jensen o reji. 10. Enrojecimiento, hinchazón o dolor en radha pierna. 11. Un aumento de peso repentino en pocos días. 12. Contar los movimientos fetales del bebé. (después de 28 semanas o el sexto mes de embarazo)  15. Radha pérdida de líquido acuoso de la vagina: puede ser un chorro, un goteo o radha humedad continua  14. Después de 20 semanas de embarazo, calambres rítmicos (más de 4 por hora) o menstruales adwoa dolor bajo / Mala Starcher TRISH EL EMBARAZO    TDAP  La tos ferina (o tos [place]) puede ser grave para cualquier persona, daryl para ricketts recién nacido, puede ser mortal. Hasta 20 recién nacidos mueren cada año en los [place] debido a la tos [place].  Aproximadamente la mitad de los bebés menores de 1 año de edad que contraen tos ferina necesitan tratamiento en el hospital. Cuanto más joven es el bebé cuando él o bhaskar son la tos [place] probable es que él o bhaskar tendrá que ser tratado en un hospital. Cuando usted recibe la vacuna contra la tos ferina (Tdap) jeremy ricketts embarazo, ricketts cuerpo creará anticuerpos protectores y algunos de ellos pasan a ricketts bebé antes de nacer. Estos anticuerpos pueden ayudar a proteger a ricketts bebé contraigan la tos ferina hasta que tienen edad suficiente para recibir la vacuna ellos mismos (generalmente alrededor de 6 meses de edad). INFLUENZA  Cambios en las funciones inmunológica, del corazón y pulmón jeremy el embarazo te hacen más susceptible a padecer seriamente enfermo de la gripe. Coger la gripe también aumenta las posibilidades de problemas graves para ricketts bebé en desarrollo, incluyendo la entrega y el trabajo de [place]. Se recomienda que todas las mujeres que están embarazadas jeremy la temporada de gripe deben recibir radha vacuna contra la influenza.

## 2023-08-02 ENCOUNTER — ULTRASOUND (OUTPATIENT)
Age: 33
End: 2023-08-02
Payer: COMMERCIAL

## 2023-08-02 VITALS
WEIGHT: 232.2 LBS | SYSTOLIC BLOOD PRESSURE: 132 MMHG | DIASTOLIC BLOOD PRESSURE: 68 MMHG | HEART RATE: 104 BPM | HEIGHT: 62 IN | BODY MASS INDEX: 42.73 KG/M2

## 2023-08-02 DIAGNOSIS — O36.5930 POOR FETAL GROWTH AFFECTING MANAGEMENT OF MOTHER IN THIRD TRIMESTER, SINGLE OR UNSPECIFIED FETUS: Primary | ICD-10-CM

## 2023-08-02 DIAGNOSIS — Z3A.35 35 WEEKS GESTATION OF PREGNANCY: ICD-10-CM

## 2023-08-02 PROCEDURE — 76820 UMBILICAL ARTERY ECHO: CPT | Performed by: STUDENT IN AN ORGANIZED HEALTH CARE EDUCATION/TRAINING PROGRAM

## 2023-08-02 PROCEDURE — 59025 FETAL NON-STRESS TEST: CPT | Performed by: STUDENT IN AN ORGANIZED HEALTH CARE EDUCATION/TRAINING PROGRAM

## 2023-08-02 PROCEDURE — 76815 OB US LIMITED FETUS(S): CPT | Performed by: STUDENT IN AN ORGANIZED HEALTH CARE EDUCATION/TRAINING PROGRAM

## 2023-08-02 NOTE — LETTER
NST sleeve cover sheet    Patient name: Sheri Garcia  : 1990  MRN: [id number]    SHAHRIAR: Estimated Date of Delivery: 23    Obstetrician: _______________SW___________    Reason(s) for testing:  __________________Obesity. IUGR___________________      Testing frequency:    ___ 2x/wk  _x_ 1x/wk  _x_ Dopplers  ___ BPP?       Last growth scan: __________________________________________

## 2023-08-02 NOTE — PROGRESS NOTES
Repeat Non-Stress Testing:    Patient verbalizes +FM. Pt denies ALL:               Leaking of fluid   Contractions   Vaginal bleeding   Decreased fetal movement    Patient is performing daily kick counts. Patient has no questions or concerns. NST strip reviewed by Dr. Shruti Andersen.

## 2023-08-02 NOTE — PROGRESS NOTES
[address] [place] [address] Dr: Ms. Hebertmacie Ye was seen today for umbilical artery Doppler study, CHIO, and NST. See ultrasound report under "OB Procedures" tab. Please don't hesitate to contact our office with any concerns or questions.   -Sandra Flores MD

## 2023-08-07 ENCOUNTER — APPOINTMENT (OUTPATIENT)
Dept: LAB | Facility: HOSPITAL | Age: 33
End: 2023-08-07
Payer: COMMERCIAL

## 2023-08-07 ENCOUNTER — ROUTINE PRENATAL (OUTPATIENT)
Dept: OBGYN CLINIC | Facility: CLINIC | Age: 33
End: 2023-08-07

## 2023-08-07 VITALS
HEART RATE: 88 BPM | WEIGHT: 230.2 LBS | DIASTOLIC BLOOD PRESSURE: 70 MMHG | SYSTOLIC BLOOD PRESSURE: 123 MMHG | BODY MASS INDEX: 42.1 KG/M2

## 2023-08-07 DIAGNOSIS — Z3A.35 35 WEEKS GESTATION OF PREGNANCY: ICD-10-CM

## 2023-08-07 DIAGNOSIS — Z34.93 PRENATAL CARE, THIRD TRIMESTER: ICD-10-CM

## 2023-08-07 DIAGNOSIS — Z34.93 PRENATAL CARE IN THIRD TRIMESTER: Primary | ICD-10-CM

## 2023-08-07 DIAGNOSIS — O36.5930 POOR FETAL GROWTH AFFECTING MANAGEMENT OF MOTHER IN THIRD TRIMESTER, SINGLE OR UNSPECIFIED FETUS: ICD-10-CM

## 2023-08-07 DIAGNOSIS — Z34.93 PRENATAL CARE IN THIRD TRIMESTER: ICD-10-CM

## 2023-08-07 PROCEDURE — 59025 FETAL NON-STRESS TEST: CPT | Performed by: NURSE PRACTITIONER

## 2023-08-07 PROCEDURE — 87184 SC STD DISK METHOD PER PLATE: CPT | Performed by: NURSE PRACTITIONER

## 2023-08-07 PROCEDURE — 87591 N.GONORRHOEAE DNA AMP PROB: CPT | Performed by: NURSE PRACTITIONER

## 2023-08-07 PROCEDURE — 86901 BLOOD TYPING SEROLOGIC RH(D): CPT

## 2023-08-07 PROCEDURE — 82950 GLUCOSE TEST: CPT

## 2023-08-07 PROCEDURE — 86850 RBC ANTIBODY SCREEN: CPT

## 2023-08-07 PROCEDURE — 86780 TREPONEMA PALLIDUM: CPT

## 2023-08-07 PROCEDURE — 99213 OFFICE O/P EST LOW 20 MIN: CPT | Performed by: NURSE PRACTITIONER

## 2023-08-07 PROCEDURE — 82728 ASSAY OF FERRITIN: CPT

## 2023-08-07 PROCEDURE — 87150 DNA/RNA AMPLIFIED PROBE: CPT | Performed by: NURSE PRACTITIONER

## 2023-08-07 PROCEDURE — 87491 CHLMYD TRACH DNA AMP PROBE: CPT | Performed by: NURSE PRACTITIONER

## 2023-08-07 PROCEDURE — 85025 COMPLETE CBC W/AUTO DIFF WBC: CPT

## 2023-08-07 PROCEDURE — 36415 COLL VENOUS BLD VENIPUNCTURE: CPT

## 2023-08-07 PROCEDURE — 86900 BLOOD TYPING SEROLOGIC ABO: CPT

## 2023-08-07 NOTE — PROGRESS NOTES
Problem: Impaired Physical Mobility  Goal: # Bed mobility, ambulation, and ADLs are maintained or returned to baseline during hospitalization  Outcome: Outcome Met, Continue evaluating goal progress toward completion  Note: Pt up frequently to use the bathroom. PT and OT have been working with pt. Will continue to monitor.       Ernesto Baldwin presents today for routine OB visit at 35w6d. Blood Pressure: 123/70  Jj=295 kg (230 lb 3.2 oz); Body mass index is 42.1 kg/m².; TWG=18.2 kg (40 lb 3.2 oz)  Fetal Heart Rate: 145  SVE today: Cervical Dilation: Closed /Cervical Effacement: 0 /Fetal Station: Ballotable   NST is reactive now. Abdomen: gravid, soft, non-tender. She reports no complaints. Denies uterine contractions. Denies vaginal bleeding or leaking of fluid. Reports adequate fetal movement of at least 10 movements in 2 hours once daily. She states she will go to lab directly after today's visit to have 28 week labwork performed. Reviewed labor precautions and fetal kick counts as well as pre-eclampsia warning signs. Reviewed perineal massage for decreasing risk of perineal lacerations during delivery. Advised to continue medications and return in 1 week. Current Outpatient Medications   Medication Instructions   • albuterol (ProAir HFA) 90 mcg/act inhaler 2 puffs, Inhalation, Every 6 hours PRN   • fluticasone (Flovent HFA) 220 mcg/act inhaler 2 puffs, Inhalation, 2 times daily, Rinse mouth after use. • Prenatal Vit-Fe Fumarate-FA (Prenatal Vitamin) 27-0.8 MG TABS 1 tablet, Oral, Daily       Laboratory workup: initial OB labs (done 1/31/23); 28 week labs (ordered 6/29/23); 36 weeks cultures (done 8/7/23)    Genetic Screening: NIPS negative, MSAFP not done    Vaccinations: influenza (refused 1/24/23);  Tdap (given 6/29/23); COVID-19 (recommended & declined 1/31/23)    Postpartum contraception: desires patch    Fetal Ultrasounds:  1/12/23 (6w2d) EDC confirmed  2/28/23 (13w0d) NT WNL, [facility] noted  4/27/23 (21w2d) no previa, kevin WNL w/missed views  7/12/23 (32w1d) EFW<3%, AC<1%, CHIO=12.9cm, UA dopps elevated, kevin WNL & complete  7/27/23 (34w2d) EFW<3%, AC=4%, CHOI=9.2cm, UA dopps WNL  8/2/23 (35w1d) CHIO=8.7cm, UA dopps WNL, vertex      G1 Problems (from 01/12/23 to present)     Problem Noted Resolved    IUGR 7/12/2023 by Jeanette Donovan MD No    Overview Addendum 7/31/2023  3:13 PM by TAPAN Contreras     Noted @91 weeks (with elevated UA dopplers)  Needs Betamethasone - given 7/13/23 & 7/14/23  Needs twice weekly NST, weekly UA dopplers/CHIO - in progress alternating b/w MFM and OB  Needs delivery @37 weeks - scheduled for induction 8/15/23 @0800         Abnormal Pap smear of cervix 3/1/2023 by Sri Spann MD No    Overview Addendum 3/29/2023  2:36 PM by Shannon Wu, [facility] with + other HRHPV  Needs repeat pap 1/2024         Obesity affecting pregnancy 1/31/2023 by TAPAN Contreras No    Overview Addendum 8/7/2023  1:30 PM by Shannon Wu, [address] BMI=34.74  Needs early GDM screen - done WNL  Serial growth scans - done         Asthma during pregnancy 1/31/2023 by TAPAN Contreras No    Overview Addendum 5/25/2023  2:34 PM by TAPAN Contreras     Albuterol prn  Flovent started 5/25/23

## 2023-08-07 NOTE — PATIENT INSTRUCTIONS
Jessica por ricketts confianza en nuestro equipo. Rajaton Irving y agradecemos leoncio comentarios. Si recibe radha encuesta nuestra, tómese unos momentos para informarnos cómo estamos. TAPAN Tracey       CARLOTA DE PARTO   Llame a Dorma Dieter al [phone] para cualquiera de los siguientes:    * Necesito llamar inmediatamente yo si tengo incluso radha pequeña cantidad de LIQUIDO se escapa de mi vagina, con o sin contracciones. * Autumn llamar si tengo SANGRADO radha cantidad igual o más que un período. Adriana Enter cantidad de flujo vaginal sangriento es normal al final del embarazo. * [place] llamar si tengo CONTRACCIONES cada brandy minutos jeremy al [employer] Necesito un reloj para tiempo. Yo autumn tiempo desde el comienzo de radha contracción hasta el comienzo de la siguiente. * De ser necesario ANTES DE  ir al hospital.   * Necesito tener un plan para TRANSPORTE a ir al hospital cuando estoy en [place]сандр de [place]. Trabajo generalmente no es radha emergencia que requiere radha ambulancia. CUENTAS DEL RETROCESO FETAL    En el tercer trimestre (después de 28 semanas de gestación) deben realizar patada fetal cuentas cada día. Rick[place] bebé debe moverse al menos 10 veces en 2 horas jeremy un tiempo [place], radha vez al día. Elegir el atime del día cuando el bebé es [place]. Trate de hacerlo a la misma hora cada día. Entrar en radha posición cómoda y luego escribir el tiempo que tu bebé se mueve blaise. Cuenta cada movimiento hasta que el bebé se mueve 10 veces. Estos movimientos incluyen patadas, puñetazos, codazos, revolotea o rollos. [place] puede tardar entre 5 minutos a 2 horas. Anote el tiempo que sientes movimiento 10 del bebé. Si ha pasado 2 horas y tu bebé no se ha movido al menos 10 veces, usted debe llamar a la oficina de inmediato.  [phone]          MASAJE EN LA JOSE PERINEAL O VAGINAL    Que puedo hacer ahora para reducir las probabilidades de [place] parto? Masaje alrededor del orificio de la vagina realizado por usted misma (o por ricketts barber). El masaje en esta radha, ya sea antes del parto o jeremy la segunda etapa del parto, [place] reducir la probabilidad de desgarro perineal jeremy el parto. Asimismo, el uso de compresas tibias en el perineo jeremy la etapa expulsiva del parto puede reducir la pravedad del desparro. [place] sucedera jeremy la etapa expulsiva del parto. En casa tambien puede reducir las probabilidades de sufrir lesiones durnate el parto de con masajes en la radha perineal.    Sonia Divers? Todas las mujeres embarazadas a partir de, [place], las 34 semanas de [place]. Cuando? Usted o ricketts barber deben hacer masajes en la radha vaginal jeremy 5 a 10 minutos de 1 a 4 veces por semana. Adwoa? Use radha mezcla de agua y aceite de [place], rajeev u garcia con 1 o 2 dedos (debbie la comodidad). Inserte los dedos en la vagina entre 3 y 5cm. Aplique presion general hacia abajo y [employer] costados jeremy 5 a [address] 3 y las 9 horas, de 1 a 4 veces por semana. GROUP B STREP    Group B Strep (GBS) es radha bacteria vaginal común. Aproximadamente el 25% de las mujeres normalmente tienen la bacteria GBS en la vagina. NO es radha infección de transmisión sexual. ¡De hecho, no es radha infección! Es solo radha bacteria vaginal normal para muchas mujeres. Sin embargo, la bacteria GBS puede ser peligrosas para un bebé recién nacido si el bebé está expuesto a mona bacteria particular jeremy el parto y el nacimiento Y desarrolla radha infección de la bacteria. La probabilidad de radha infección de GBS en recién nacidos para radha doroteo que tiene las bacteria GBS en la vagina es cerca de 1% - 2%. Jose si la infeccion produce, un bebé podría ser gravemente enfermo. Por esta razón, hacemos un cultivo vaginal (Q-Tip de la vagina y el recto) para todas las mujeres embarazadas en el aproximadamente 39 semanas de [place].  Si el cultivo demuestra que hay bacteria GBS presente, no es radha razón para el pánico! [employer] en esta situación dará lilly antibióticos de la doroteo a través de ricketts IV mientras está en parto. Cuando radha madre se trata con antibióticos jeremy el parto, ricketts bebé REED NUNCA se infecta con la bacteria GBS.

## 2023-08-08 DIAGNOSIS — O99.013 ANEMIA AFFECTING PREGNANCY IN THIRD TRIMESTER: Primary | ICD-10-CM

## 2023-08-08 PROBLEM — O99.019 ANEMIA DURING PREGNANCY: Status: ACTIVE | Noted: 2023-08-08

## 2023-08-08 LAB
ABO GROUP BLD: NORMAL
BASOPHILS # BLD AUTO: 0.01 THOUSANDS/ÂΜL (ref 0–0.1)
BASOPHILS NFR BLD AUTO: 0 % (ref 0–1)
BLD GP AB SCN SERPL QL: NEGATIVE
C TRACH DNA SPEC QL NAA+PROBE: NEGATIVE
EOSINOPHIL # BLD AUTO: 0.05 THOUSAND/ÂΜL (ref 0–0.61)
EOSINOPHIL NFR BLD AUTO: 1 % (ref 0–6)
ERYTHROCYTE [DISTWIDTH] IN BLOOD BY AUTOMATED COUNT: 13.8 % (ref 11.6–15.1)
FERRITIN SERPL-MCNC: 19 NG/ML (ref 11–307)
GLUCOSE 1H P 50 G GLC PO SERPL-MCNC: 52 MG/DL (ref 40–134)
HCT VFR BLD AUTO: 31.3 % (ref 34.8–46.1)
HGB BLD-MCNC: 10 G/DL (ref 11.5–15.4)
IMM GRANULOCYTES # BLD AUTO: 0.05 THOUSAND/UL (ref 0–0.2)
IMM GRANULOCYTES NFR BLD AUTO: 1 % (ref 0–2)
LYMPHOCYTES # BLD AUTO: 0.8 THOUSANDS/ÂΜL (ref 0.6–4.47)
LYMPHOCYTES NFR BLD AUTO: 18 % (ref 14–44)
MCH RBC QN AUTO: 32.5 PG (ref 26.8–34.3)
MCHC RBC AUTO-ENTMCNC: 31.9 G/DL (ref 31.4–37.4)
MCV RBC AUTO: 102 FL (ref 82–98)
MONOCYTES # BLD AUTO: 0.34 THOUSAND/ÂΜL (ref 0.17–1.22)
MONOCYTES NFR BLD AUTO: 8 % (ref 4–12)
N GONORRHOEA DNA SPEC QL NAA+PROBE: NEGATIVE
NEUTROPHILS # BLD AUTO: 3.11 THOUSANDS/ÂΜL (ref 1.85–7.62)
NEUTS SEG NFR BLD AUTO: 72 % (ref 43–75)
NRBC BLD AUTO-RTO: 0 /100 WBCS
PLATELET # BLD AUTO: 219 THOUSANDS/UL (ref 149–390)
PMV BLD AUTO: 9.9 FL (ref 8.9–12.7)
RBC # BLD AUTO: 3.08 MILLION/UL (ref 3.81–5.12)
RH BLD: POSITIVE
SPECIMEN EXPIRATION DATE: NORMAL
WBC # BLD AUTO: 4.36 THOUSAND/UL (ref 4.31–10.16)

## 2023-08-08 RX ORDER — MULTIVITAMIN WITH IRON
50 TABLET ORAL DAILY
Qty: 60 TABLET | Refills: 2 | Status: SHIPPED | OUTPATIENT
Start: 2023-08-08

## 2023-08-08 RX ORDER — FERROUS SULFATE TAB EC 324 MG (65 MG FE EQUIVALENT) 324 (65 FE) MG
324 TABLET DELAYED RESPONSE ORAL
Qty: 60 TABLET | Refills: 2 | Status: SHIPPED | OUTPATIENT
Start: 2023-08-08

## 2023-08-09 ENCOUNTER — TELEPHONE (OUTPATIENT)
Dept: OBGYN CLINIC | Facility: CLINIC | Age: 33
End: 2023-08-09

## 2023-08-09 PROBLEM — O99.820 GBS (GROUP B STREPTOCOCCUS CARRIER), +RV CULTURE, CURRENTLY PREGNANT: Status: ACTIVE | Noted: 2023-08-09

## 2023-08-09 LAB
GP B STREP DNA SPEC QL NAA+PROBE: POSITIVE
TREPONEMA PALLIDUM IGG+IGM AB [PRESENCE] IN SERUM OR PLASMA BY IMMUNOASSAY: NORMAL

## 2023-08-09 NOTE — TELEPHONE ENCOUNTER
----- Message from Andrews Mcfarland MD sent at 8/9/2023  3:30 PM EDT -----  Please let the patient know her syphilis screening is negative. Thanks!

## 2023-08-10 ENCOUNTER — ROUTINE PRENATAL (OUTPATIENT)
Age: 33
End: 2023-08-10
Payer: COMMERCIAL

## 2023-08-10 ENCOUNTER — HOSPITAL ENCOUNTER (INPATIENT)
Facility: HOSPITAL | Age: 33
LOS: 4 days | Discharge: HOME | DRG: 540 | End: 2023-08-14
Attending: STUDENT IN AN ORGANIZED HEALTH CARE EDUCATION/TRAINING PROGRAM | Admitting: OBSTETRICS & GYNECOLOGY
Payer: COMMERCIAL

## 2023-08-10 ENCOUNTER — ULTRASOUND (OUTPATIENT)
Age: 33
End: 2023-08-10
Payer: COMMERCIAL

## 2023-08-10 DIAGNOSIS — Z36.89 ENCOUNTER FOR ULTRASOUND TO CHECK FETAL GROWTH: ICD-10-CM

## 2023-08-10 DIAGNOSIS — Z98.891 S/P PRIMARY LOW TRANSVERSE C-SECTION: ICD-10-CM

## 2023-08-10 DIAGNOSIS — O36.5930 POOR FETAL GROWTH AFFECTING MANAGEMENT OF MOTHER IN THIRD TRIMESTER, SINGLE OR UNSPECIFIED FETUS: Primary | ICD-10-CM

## 2023-08-10 DIAGNOSIS — O36.5930 POOR FETAL GROWTH AFFECTING MANAGEMENT OF MOTHER IN THIRD TRIMESTER, SINGLE OR UNSPECIFIED FETUS: ICD-10-CM

## 2023-08-10 DIAGNOSIS — Z3A.35 35 WEEKS GESTATION OF PREGNANCY: Primary | ICD-10-CM

## 2023-08-10 PROBLEM — Z3A.36 36 WEEKS GESTATION OF PREGNANCY: Status: ACTIVE | Noted: 2023-01-31

## 2023-08-10 PROBLEM — O42.919 PRETERM PREMATURE RUPTURE OF MEMBRANES (PPROM) WITH UNKNOWN ONSET OF LABOR: Status: ACTIVE | Noted: 2023-08-10

## 2023-08-10 LAB
ABO GROUP BLD: NORMAL
ALBUMIN SERPL BCP-MCNC: 3.5 G/DL (ref 3.5–5)
ALP SERPL-CCNC: 61 U/L (ref 34–104)
ALT SERPL W P-5'-P-CCNC: 16 U/L (ref 7–52)
ANION GAP SERPL CALCULATED.3IONS-SCNC: 9 MMOL/L
AST SERPL W P-5'-P-CCNC: 19 U/L (ref 13–39)
BASOPHILS # BLD AUTO: 0.01 THOUSANDS/ÂΜL (ref 0–0.1)
BASOPHILS NFR BLD AUTO: 0 % (ref 0–1)
BILIRUB SERPL-MCNC: 0.24 MG/DL (ref 0.2–1)
BLD GP AB SCN SERPL QL: NEGATIVE
BUN SERPL-MCNC: 7 MG/DL (ref 5–25)
CALCIUM SERPL-MCNC: 8.9 MG/DL (ref 8.4–10.2)
CHLORIDE SERPL-SCNC: 106 MMOL/L (ref 96–108)
CO2 SERPL-SCNC: 21 MMOL/L (ref 21–32)
CREAT SERPL-MCNC: 0.54 MG/DL (ref 0.6–1.3)
EOSINOPHIL # BLD AUTO: 0.08 THOUSAND/ÂΜL (ref 0–0.61)
EOSINOPHIL NFR BLD AUTO: 1 % (ref 0–6)
ERYTHROCYTE [DISTWIDTH] IN BLOOD BY AUTOMATED COUNT: 13.2 % (ref 11.6–15.1)
GFR SERPL CREATININE-BSD FRML MDRD: 125 ML/MIN/1.73SQ M
GLUCOSE SERPL-MCNC: 95 MG/DL (ref 65–140)
HCT VFR BLD AUTO: 29.7 % (ref 34.8–46.1)
HGB BLD-MCNC: 9.9 G/DL (ref 11.5–15.4)
IMM GRANULOCYTES # BLD AUTO: 0.05 THOUSAND/UL (ref 0–0.2)
IMM GRANULOCYTES NFR BLD AUTO: 1 % (ref 0–2)
LYMPHOCYTES # BLD AUTO: 0.93 THOUSANDS/ÂΜL (ref 0.6–4.47)
LYMPHOCYTES NFR BLD AUTO: 17 % (ref 14–44)
MCH RBC QN AUTO: 32.2 PG (ref 26.8–34.3)
MCHC RBC AUTO-ENTMCNC: 33.3 G/DL (ref 31.4–37.4)
MCV RBC AUTO: 97 FL (ref 82–98)
MONOCYTES # BLD AUTO: 0.4 THOUSAND/ÂΜL (ref 0.17–1.22)
MONOCYTES NFR BLD AUTO: 7 % (ref 4–12)
NEUTROPHILS # BLD AUTO: 4.08 THOUSANDS/ÂΜL (ref 1.85–7.62)
NEUTS SEG NFR BLD AUTO: 74 % (ref 43–75)
NRBC BLD AUTO-RTO: 0 /100 WBCS
PLATELET # BLD AUTO: 233 THOUSANDS/UL (ref 149–390)
PMV BLD AUTO: 9.7 FL (ref 8.9–12.7)
POTASSIUM SERPL-SCNC: 3.6 MMOL/L (ref 3.5–5.3)
PROT SERPL-MCNC: 6.2 G/DL (ref 6.4–8.4)
RBC # BLD AUTO: 3.07 MILLION/UL (ref 3.81–5.12)
RH BLD: POSITIVE
SODIUM SERPL-SCNC: 136 MMOL/L (ref 135–147)
SPECIMEN EXPIRATION DATE: NORMAL
WBC # BLD AUTO: 5.55 THOUSAND/UL (ref 4.31–10.16)

## 2023-08-10 PROCEDURE — 86780 TREPONEMA PALLIDUM: CPT

## 2023-08-10 PROCEDURE — 99202 OFFICE O/P NEW SF 15 MIN: CPT

## 2023-08-10 PROCEDURE — 59025 FETAL NON-STRESS TEST: CPT | Performed by: OBSTETRICS & GYNECOLOGY

## 2023-08-10 PROCEDURE — 85025 COMPLETE CBC W/AUTO DIFF WBC: CPT

## 2023-08-10 PROCEDURE — 86900 BLOOD TYPING SEROLOGIC ABO: CPT

## 2023-08-10 PROCEDURE — 86850 RBC ANTIBODY SCREEN: CPT

## 2023-08-10 PROCEDURE — NC001 PR NO CHARGE: Performed by: OBSTETRICS & GYNECOLOGY

## 2023-08-10 PROCEDURE — 86901 BLOOD TYPING SEROLOGIC RH(D): CPT

## 2023-08-10 PROCEDURE — 80053 COMPREHEN METABOLIC PANEL: CPT

## 2023-08-10 PROCEDURE — 76820 UMBILICAL ARTERY ECHO: CPT | Performed by: OBSTETRICS & GYNECOLOGY

## 2023-08-10 PROCEDURE — 76816 OB US FOLLOW-UP PER FETUS: CPT | Performed by: OBSTETRICS & GYNECOLOGY

## 2023-08-10 RX ORDER — VANCOMYCIN HYDROCHLORIDE 1 G/200ML
15 INJECTION, SOLUTION INTRAVENOUS EVERY 8 HOURS
Status: DISCONTINUED | OUTPATIENT
Start: 2023-08-11 | End: 2023-08-11

## 2023-08-10 RX ORDER — SODIUM CHLORIDE, SODIUM LACTATE, POTASSIUM CHLORIDE, CALCIUM CHLORIDE 600; 310; 30; 20 MG/100ML; MG/100ML; MG/100ML; MG/100ML
125 INJECTION, SOLUTION INTRAVENOUS CONTINUOUS
Status: DISCONTINUED | OUTPATIENT
Start: 2023-08-10 | End: 2023-08-14 | Stop reason: HOSPADM

## 2023-08-10 RX ADMIN — VANCOMYCIN HYDROCHLORIDE 2000 MG: 1 INJECTION, POWDER, LYOPHILIZED, FOR SOLUTION INTRAVENOUS at 16:07

## 2023-08-10 RX ADMIN — Medication 25 MCG: at 15:22

## 2023-08-10 RX ADMIN — VANCOMYCIN HYDROCHLORIDE 1000 MG: 1 INJECTION, SOLUTION INTRAVENOUS at 23:59

## 2023-08-10 RX ADMIN — SODIUM CHLORIDE, SODIUM LACTATE, POTASSIUM CHLORIDE, AND CALCIUM CHLORIDE 125 ML/HR: .6; .31; .03; .02 INJECTION, SOLUTION INTRAVENOUS at 15:17

## 2023-08-10 RX ADMIN — Medication 50 MCG: at 19:52

## 2023-08-10 NOTE — H&P
[address] Fall 29 y.o. female MRN: [id number]  Unit/Bed#: L&D 322-01 Encounter: [id number]    Assessment: 29 y.o. Tl Sharp at 36w2d admitted for  premature rupture of membranes. SVE: 0/0/-5  FHT: baseline 150, category I  Clinical EFW: 2204 g (4%), AC 5%, HC <1% ; Cephalic confirmed by TAUS  GBS status: positive    Postpartum contraception plan: patch 6w out from delivery, needs discussion for bridge    Plan:   GBS+  Assessment & Plan  Patient reports anaphylactic reaction to penicillin when she was young  GBS sensitivities pending  Vancomycin 2g q8h ordered    Anemia during pregnancy  Assessment & Plan   Hgb 10.0   Follow up admission CBC      IUGR  Assessment & Plan  AC             29.74 cm        33 weeks 5 days* (5%)  BPD             8.48 cm        34 weeks 1 day * (9%)  HC             30.59 cm        34 weeks 1 day * (<1%)  Femur           6.30 cm        32 weeks 4 days* (<1%)     EFW Hadlock 4   2204 grams - 4 lbs 14 oz     (4%)  Normal dopplers    36 weeks gestation of pregnancy  Assessment & Plan  Betamethasone complete -    Obesity affecting pregnancy  Assessment & Plan  BMI 42.74  Plan for PP DVT ppx: SCDs and lovenox 40 mg BID     *  premature rupture of membranes (PPROM) with unknown onset of labor  Assessment & Plan  Admit to OBN   Clear liquid diet   F/u T&S, CBC, RPR, CMP  IVF LR 125cc/hr   Continuous fetal monitoring and tocometry   Analgesia at maternal request   Vertex by TAUS  Induction plan: buccal cytotec followed by pitocin           Discussed case and plan w/ Dr. Erna Weiner      Chief Complaint: leaking fluid    HPI: Miquel Kwan is a 29 y.o. Tl Sharp with an SHAHRIAR of 2023, by Ultrasound at 36w2d who is being admitted for  premature rupture of membranes. She denies having uterine contractions, has moderate LOF, and reports no VB. She states she has felt good FM.     Patient Active Problem List   Diagnosis   • Obesity affecting pregnancy   • 36 weeks gestation of pregnancy   • Asthma during pregnancy   • Abnormal Pap smear of cervix   • IUGR   • Anemia during pregnancy   • GBS+   •  premature rupture of membranes (PPROM) with unknown onset of labor       Baby complications/comments: FGR EFW 2204g 4%tile, AC 5%tile, HC <1%tile    Review of Systems   Constitutional: Negative for chills and fever. HENT: Negative for congestion and sore throat. Eyes: Negative for visual disturbance. Respiratory: Negative for cough and shortness of breath. Cardiovascular: Negative for chest pain. Gastrointestinal: Negative for abdominal pain, constipation, diarrhea, nausea and vomiting. Genitourinary: Negative for dysuria. Musculoskeletal: Positive for back pain. Neurological: Negative for dizziness and headaches. OB Hx:  OB History    Para Term  AB Living   1 0 0 0 0 0   SAB IAB Ectopic Multiple Live Births   0 0 0 0 0      # Outcome Date GA Lbr Van/2nd Weight Sex Delivery Anes PTL Lv   1 Current                Past Medical Hx:  Past Medical History:   Diagnosis Date   • Abnormal Pap smear of cervix     2023 NILM pap/+ other HRHPV   • Asthma        Past Surgical hx:  Past Surgical History:   Procedure Laterality Date   • NO PAST SURGERIES         Social Hx:  Alcohol use: denies  Tobacco use: denies  Other substance use: denies      Allergies   Allergen Reactions   • Penicillins        Medications Prior to Admission   Medication   • albuterol (ProAir HFA) 90 mcg/act inhaler   • ferrous sulfate 324 (65 Fe) mg   • fluticasone (Flovent HFA) 220 mcg/act inhaler   • Prenatal Vit-Fe Fumarate-FA (Prenatal Vitamin) 27-0.8 MG TABS   • vitamin B-12 (CYANOCOBALAMIN) 50 MCG tablet       Objective:  Temp:  [98.6 °F (37 °C)] 98.6 °F (37 °C)  HR:  [85-91] 91  Resp:  [18] 18  BP: (120-141)/(70-76) 141/76  Body mass index is 42.74 kg/m². Physical Exam:  Physical Exam  Constitutional:       Appearance: Normal appearance.  She is obese.   Genitourinary:      Vulva and rectum normal.      Genitourinary Comments: Speculum exam positive for pooling of clear fluid. More pooling with valsalva. Nitrazine positive. Positive ferning. Microscopy negative for other infections. HENT:      Head: Normocephalic and atraumatic. Mouth/Throat:      Mouth: Mucous membranes are moist.   Eyes:      General: No scleral icterus. Extraocular Movements: Extraocular movements intact. Cardiovascular:      Rate and Rhythm: Normal rate. Pulmonary:      Effort: Pulmonary effort is normal. No respiratory distress. Abdominal:      General: There is no distension. Palpations: Abdomen is soft. Tenderness: There is no abdominal tenderness. Comments: Gravid   Musculoskeletal:      Right lower leg: No edema. Left lower leg: No edema. Neurological:      General: No focal deficit present. Mental Status: She is alert. Skin:     General: Skin is warm. Psychiatric:         Mood and Affect: Mood normal.         Behavior: Behavior normal.         Thought Content: Thought content normal.         Judgment: Judgment normal.   Vitals and nursing note reviewed. Exam conducted with a chaperone present.             FHT:  Baseline Rate: 150 bpm  Variability: Moderate 6-25 bpm  Accelerations: 15 x 15 or greater  Decelerations: None  FHR Category: Category I    TOCO:   Contraction Frequency (minutes): 0  Contraction Quality: Not applicable    Lab Results   Component Value Date    WBC 5.55 08/10/2023    HGB 9.9 (L) 08/10/2023    HCT 29.7 (L) 08/10/2023     08/10/2023     Lab Results   Component Value Date    K 3.4 (L) 08/01/2022     08/01/2022    CO2 28 08/01/2022    BUN 11 08/01/2022    CREATININE 0.76 08/01/2022    AST 24 08/01/2022    ALT 25 08/01/2022     Prenatal Labs: Reviewed      Blood type: A pos  Antibody: negative  GBS: positive, sensitivities pending   HIV: non-reactive  Rubella: immune  Syphilis IgM/IgG: non-reactive, admission test pending  HBsAg: non-reactive   HCAb: non-reactive   Chlamydia: negative  Gonorrhea: negative   Diabetes 1 hour screen: early and regular screening both normal   Platelets: admission platelets 852  Hgb: admission Hgb 9.9  >2 Midnights  INPATIENT     Signature/Title: Salena Cabot, MD  Date: 8/10/2023  Time: 3:16 PM

## 2023-08-10 NOTE — PROGRESS NOTES
[address] [place] [address] Dr: Ms. Jenny Pollack was seen today for umbilical artery Doppler study, CHIO, NST, and growth ultrasound. See ultrasound report under "OB Procedures" tab. The time spent on this established patient on the encounter date included 5 minutes previsit service time reviewing records and precharting, 5 minutes face-to-face service time counseling regarding results and coordinating care, and  4 minutes charting, totalling 14 minutes. Please don't hesitate to contact our office with any concerns or questions.   Erlinda Bright MD

## 2023-08-10 NOTE — OB LABOR/OXYTOCIN SAFETY PROGRESS
Labor Progress Note - Kenan Alcantara 29 y.o. female MRN: [id number]    Unit/Bed#: L&D 322-01 Encounter: [id number]       Contraction Frequency (minutes): 0  Contraction Quality: Not applicable  Tachysystole: No   Cervical Dilation: Closed        Cervical Effacement: 0  Fetal Station: Floating  Baseline Rate: 140 bpm     FHR Category: Category I               Vital Signs:   Vitals:    08/10/23 1653   BP: 128/55   Pulse: 77   Resp:    Temp:    SpO2:        Notes/comments:   Check deferred at this time due to PPROM in an attempt to reduce SVEs and lower infection risk.    Second dose of PO cytotec ordered  Discussed with Dr. Antonino Esquivel MD 8/10/2023 7:22 PM

## 2023-08-10 NOTE — ASSESSMENT & PLAN NOTE
Admit to OBGYN   Clear liquid diet   F/u T&S, CBC, RPR, CMP  IVF LR 125cc/hr   Continuous fetal monitoring and tocometry   Analgesia at maternal request   Vertex by TAUS  Induction plan: buccal cytotec followed by pitocin

## 2023-08-10 NOTE — LETTER
Date: 8/10/2023    Rony Azul, [address] [address]    Patient: Philipp Skinner   YOB: 1990   Date of Visit: 8/10/2023   Gestational age Rubensiva Jordana of this communication: Routine though please note her delivery date should be shifted back, into the 58c3s-62l4l range, due to EFW today. Dear Mackenzie Garcia ,    This patient was seen recently in our  office. Please see ultrasound report under "OB Procedures" tab. Please don't hesitate to contact our office with any concerns or questions.       Sincerely,      Liz Kee MD  Attending Physician, [address] [place] [address]

## 2023-08-10 NOTE — ASSESSMENT & PLAN NOTE
AC             29.74 cm        33 weeks 5 days* (5%)  BPD             8.48 cm        34 weeks 1 day * (9%)  HC             30.59 cm        34 weeks 1 day * (<1%)  Femur           6.30 cm        32 weeks 4 days* (<1%)     EFW Hadlock 4   2204 grams - 4 lbs 14 oz     (4%)  Normal dopplers

## 2023-08-11 ENCOUNTER — ANESTHESIA (INPATIENT)
Dept: LABOR AND DELIVERY | Facility: HOSPITAL | Age: 33
End: 2023-08-11
Payer: COMMERCIAL

## 2023-08-11 ENCOUNTER — ANESTHESIA EVENT (INPATIENT)
Dept: LABOR AND DELIVERY | Facility: HOSPITAL | Age: 33
End: 2023-08-11
Payer: COMMERCIAL

## 2023-08-11 LAB
BASE EXCESS BLDCOA CALC-SCNC: -8.7 MMOL/L (ref 3–11)
BASE EXCESS BLDCOV CALC-SCNC: -4.5 MMOL/L (ref 1–9)
HCO3 BLDCOA-SCNC: 18.7 MMOL/L (ref 17.3–27.3)
HCO3 BLDCOV-SCNC: 24 MMOL/L (ref 12.2–28.6)
O2 CT VFR BLDCOA CALC: 2.2 ML/DL
OXYHGB MFR BLDCOA: 13.7 %
OXYHGB MFR BLDCOV: 30.6 %
PCO2 BLDCOA: 46.2 MM[HG] (ref 30–60)
PCO2 BLDCOV: 57.8 MM HG (ref 27–43)
PH BLDCOA: 7.22 [PH] (ref 7.23–7.43)
PH BLDCOV: 7.24 [PH] (ref 7.19–7.49)
PO2 BLDCOA: 10.8 MM HG (ref 5–25)
PO2 BLDCOV: 16.7 MM HG (ref 15–45)
SAO2 % BLDCOV: 6.9 ML/DL
TREPONEMA PALLIDUM IGG+IGM AB [PRESENCE] IN SERUM OR PLASMA BY IMMUNOASSAY: NORMAL

## 2023-08-11 PROCEDURE — 4A1HXCZ MONITORING OF PRODUCTS OF CONCEPTION, CARDIAC RATE, EXTERNAL APPROACH: ICD-10-PCS | Performed by: OBSTETRICS & GYNECOLOGY

## 2023-08-11 PROCEDURE — NC001 PR NO CHARGE: Performed by: OBSTETRICS & GYNECOLOGY

## 2023-08-11 PROCEDURE — 94762 N-INVAS EAR/PLS OXIMTRY CONT: CPT

## 2023-08-11 PROCEDURE — 59514 CESAREAN DELIVERY ONLY: CPT | Performed by: OBSTETRICS & GYNECOLOGY

## 2023-08-11 PROCEDURE — 82805 BLOOD GASES W/O2 SATURATION: CPT | Performed by: OBSTETRICS & GYNECOLOGY

## 2023-08-11 PROCEDURE — 88307 TISSUE EXAM BY PATHOLOGIST: CPT | Performed by: STUDENT IN AN ORGANIZED HEALTH CARE EDUCATION/TRAINING PROGRAM

## 2023-08-11 RX ORDER — FERROUS SULFATE 325(65) MG
325 TABLET ORAL
Status: DISCONTINUED | OUTPATIENT
Start: 2023-08-12 | End: 2023-08-14 | Stop reason: HOSPADM

## 2023-08-11 RX ORDER — ACETAMINOPHEN 325 MG/1
975 TABLET ORAL EVERY 8 HOURS SCHEDULED
Status: DISCONTINUED | OUTPATIENT
Start: 2023-08-12 | End: 2023-08-14 | Stop reason: HOSPADM

## 2023-08-11 RX ORDER — ONDANSETRON 2 MG/ML
INJECTION INTRAMUSCULAR; INTRAVENOUS
Status: COMPLETED
Start: 2023-08-11 | End: 2023-08-11

## 2023-08-11 RX ORDER — CLINDAMYCIN PHOSPHATE 900 MG/50ML
900 INJECTION INTRAVENOUS ONCE
Status: COMPLETED | OUTPATIENT
Start: 2023-08-11 | End: 2023-08-11

## 2023-08-11 RX ORDER — MEPERIDINE HYDROCHLORIDE 25 MG/ML
12.5 INJECTION INTRAMUSCULAR; INTRAVENOUS; SUBCUTANEOUS
Status: DISCONTINUED | OUTPATIENT
Start: 2023-08-11 | End: 2023-08-14 | Stop reason: HOSPADM

## 2023-08-11 RX ORDER — DOCUSATE SODIUM 100 MG/1
100 CAPSULE, LIQUID FILLED ORAL 2 TIMES DAILY
Status: DISCONTINUED | OUTPATIENT
Start: 2023-08-11 | End: 2023-08-14 | Stop reason: HOSPADM

## 2023-08-11 RX ORDER — NOREPINEPHRINE BITARTRATE 1 MG/ML
INJECTION, SOLUTION INTRAVENOUS AS NEEDED
Status: DISCONTINUED | OUTPATIENT
Start: 2023-08-11 | End: 2023-08-11

## 2023-08-11 RX ORDER — GENTAMICIN SULFATE 60 MG/50ML
INJECTION, SOLUTION INTRAVENOUS AS NEEDED
Status: DISCONTINUED | OUTPATIENT
Start: 2023-08-11 | End: 2023-08-11

## 2023-08-11 RX ORDER — OXYTOCIN/RINGER'S LACTATE 30/500 ML
62.5 PLASTIC BAG, INJECTION (ML) INTRAVENOUS CONTINUOUS
Status: ACTIVE | OUTPATIENT
Start: 2023-08-11 | End: 2023-08-11

## 2023-08-11 RX ORDER — ROPIVACAINE HYDROCHLORIDE 2 MG/ML
INJECTION, SOLUTION EPIDURAL; INFILTRATION; PERINEURAL CONTINUOUS PRN
Status: DISCONTINUED | OUTPATIENT
Start: 2023-08-11 | End: 2023-08-11

## 2023-08-11 RX ORDER — OXYCODONE HYDROCHLORIDE 5 MG/1
5 TABLET ORAL EVERY 4 HOURS PRN
Status: ACTIVE | OUTPATIENT
Start: 2023-08-11 | End: 2023-08-12

## 2023-08-11 RX ORDER — FENTANYL CITRATE 50 UG/ML
INJECTION, SOLUTION INTRAMUSCULAR; INTRAVENOUS
Status: COMPLETED
Start: 2023-08-11 | End: 2023-08-11

## 2023-08-11 RX ORDER — MORPHINE SULFATE 0.5 MG/ML
INJECTION, SOLUTION EPIDURAL; INTRATHECAL; INTRAVENOUS AS NEEDED
Status: DISCONTINUED | OUTPATIENT
Start: 2023-08-11 | End: 2023-08-11

## 2023-08-11 RX ORDER — LIDOCAINE HCL/EPINEPHRINE/PF 2%-1:200K
VIAL (ML) INJECTION
Status: COMPLETED
Start: 2023-08-11 | End: 2023-08-11

## 2023-08-11 RX ORDER — DIPHENHYDRAMINE HYDROCHLORIDE 50 MG/ML
25 INJECTION INTRAMUSCULAR; INTRAVENOUS EVERY 6 HOURS PRN
Status: DISCONTINUED | OUTPATIENT
Start: 2023-08-11 | End: 2023-08-14 | Stop reason: HOSPADM

## 2023-08-11 RX ORDER — OXYTOCIN/RINGER'S LACTATE 30/500 ML
PLASTIC BAG, INJECTION (ML) INTRAVENOUS CONTINUOUS PRN
Status: DISCONTINUED | OUTPATIENT
Start: 2023-08-11 | End: 2023-08-11

## 2023-08-11 RX ORDER — ONDANSETRON 2 MG/ML
4 INJECTION INTRAMUSCULAR; INTRAVENOUS ONCE AS NEEDED
Status: DISCONTINUED | OUTPATIENT
Start: 2023-08-11 | End: 2023-08-12

## 2023-08-11 RX ORDER — OXYTOCIN/RINGER'S LACTATE 30/500 ML
PLASTIC BAG, INJECTION (ML) INTRAVENOUS
Status: DISPENSED
Start: 2023-08-11 | End: 2023-08-11

## 2023-08-11 RX ORDER — MORPHINE SULFATE 0.5 MG/ML
INJECTION, SOLUTION EPIDURAL; INTRATHECAL; INTRAVENOUS
Status: COMPLETED
Start: 2023-08-11 | End: 2023-08-11

## 2023-08-11 RX ORDER — CEFAZOLIN SODIUM 2 G/50ML
2000 SOLUTION INTRAVENOUS ONCE
Status: DISCONTINUED | OUTPATIENT
Start: 2023-08-11 | End: 2023-08-11

## 2023-08-11 RX ORDER — KETOROLAC TROMETHAMINE 30 MG/ML
15 INJECTION, SOLUTION INTRAMUSCULAR; INTRAVENOUS EVERY 6 HOURS
Status: DISPENSED | OUTPATIENT
Start: 2023-08-11 | End: 2023-08-12

## 2023-08-11 RX ORDER — NALOXONE HYDROCHLORIDE 0.4 MG/ML
0.1 INJECTION, SOLUTION INTRAMUSCULAR; INTRAVENOUS; SUBCUTANEOUS
Status: ACTIVE | OUTPATIENT
Start: 2023-08-11 | End: 2023-08-12

## 2023-08-11 RX ORDER — ONDANSETRON 2 MG/ML
4 INJECTION INTRAMUSCULAR; INTRAVENOUS EVERY 8 HOURS PRN
Status: DISCONTINUED | OUTPATIENT
Start: 2023-08-11 | End: 2023-08-14 | Stop reason: HOSPADM

## 2023-08-11 RX ORDER — ACETAMINOPHEN 325 MG/1
975 TABLET ORAL EVERY 6 HOURS SCHEDULED
Status: DISPENSED | OUTPATIENT
Start: 2023-08-11 | End: 2023-08-12

## 2023-08-11 RX ORDER — FENTANYL CITRATE 50 UG/ML
INJECTION, SOLUTION INTRAMUSCULAR; INTRAVENOUS AS NEEDED
Status: DISCONTINUED | OUTPATIENT
Start: 2023-08-11 | End: 2023-08-11

## 2023-08-11 RX ORDER — LIDOCAINE HCL/EPINEPHRINE/PF 2%-1:200K
VIAL (ML) INJECTION AS NEEDED
Status: DISCONTINUED | OUTPATIENT
Start: 2023-08-11 | End: 2023-08-11

## 2023-08-11 RX ORDER — OXYCODONE HYDROCHLORIDE 5 MG/1
5 TABLET ORAL EVERY 4 HOURS PRN
Status: DISCONTINUED | OUTPATIENT
Start: 2023-08-12 | End: 2023-08-14 | Stop reason: HOSPADM

## 2023-08-11 RX ORDER — OXYCODONE HYDROCHLORIDE 5 MG/1
10 TABLET ORAL EVERY 4 HOURS PRN
Status: DISCONTINUED | OUTPATIENT
Start: 2023-08-12 | End: 2023-08-14 | Stop reason: HOSPADM

## 2023-08-11 RX ORDER — SENNOSIDES 8.6 MG
1 TABLET ORAL
Status: DISCONTINUED | OUTPATIENT
Start: 2023-08-11 | End: 2023-08-14 | Stop reason: HOSPADM

## 2023-08-11 RX ORDER — ROPIVACAINE HYDROCHLORIDE 2 MG/ML
INJECTION, SOLUTION EPIDURAL; INFILTRATION; PERINEURAL AS NEEDED
Status: DISCONTINUED | OUTPATIENT
Start: 2023-08-11 | End: 2023-08-11

## 2023-08-11 RX ORDER — CALCIUM CARBONATE 500 MG/1
1000 TABLET, CHEWABLE ORAL DAILY PRN
Status: DISCONTINUED | OUTPATIENT
Start: 2023-08-11 | End: 2023-08-14 | Stop reason: HOSPADM

## 2023-08-11 RX ORDER — DIAPER,BRIEF,INFANT-TODD,DISP
1 EACH MISCELLANEOUS DAILY PRN
Status: DISCONTINUED | OUTPATIENT
Start: 2023-08-11 | End: 2023-08-14 | Stop reason: HOSPADM

## 2023-08-11 RX ORDER — ALBUTEROL SULFATE 90 UG/1
2 AEROSOL, METERED RESPIRATORY (INHALATION) EVERY 6 HOURS PRN
Status: DISCONTINUED | OUTPATIENT
Start: 2023-08-11 | End: 2023-08-14 | Stop reason: HOSPADM

## 2023-08-11 RX ORDER — ENOXAPARIN SODIUM 100 MG/ML
40 INJECTION SUBCUTANEOUS DAILY
Status: DISCONTINUED | OUTPATIENT
Start: 2023-08-12 | End: 2023-08-14 | Stop reason: HOSPADM

## 2023-08-11 RX ORDER — ONDANSETRON 2 MG/ML
INJECTION INTRAMUSCULAR; INTRAVENOUS AS NEEDED
Status: DISCONTINUED | OUTPATIENT
Start: 2023-08-11 | End: 2023-08-11

## 2023-08-11 RX ADMIN — SODIUM CHLORIDE, SODIUM LACTATE, POTASSIUM CHLORIDE, AND CALCIUM CHLORIDE 125 ML/HR: .6; .31; .03; .02 INJECTION, SOLUTION INTRAVENOUS at 03:41

## 2023-08-11 RX ADMIN — LIDOCAINE HYDROCHLORIDE,EPINEPHRINE BITARTRATE 5 ML: 20; .005 INJECTION, SOLUTION EPIDURAL; INFILTRATION; INTRACAUDAL; PERINEURAL at 08:35

## 2023-08-11 RX ADMIN — Medication 50 MCG: at 01:09

## 2023-08-11 RX ADMIN — Medication 750 MILLI-UNITS/MIN: at 08:50

## 2023-08-11 RX ADMIN — NOREPINEPHRINE BITARTRATE 10 MCG: 1 INJECTION, SOLUTION, CONCENTRATE INTRAVENOUS at 09:05

## 2023-08-11 RX ADMIN — MORPHINE SULFATE 3 MG: 0.5 INJECTION, SOLUTION EPIDURAL; INTRATHECAL; INTRAVENOUS at 08:51

## 2023-08-11 RX ADMIN — DOCUSATE SODIUM 100 MG: 100 CAPSULE, LIQUID FILLED ORAL at 12:12

## 2023-08-11 RX ADMIN — ROPIVACAINE HYDROCHLORIDE 5 ML: 2 INJECTION, SOLUTION EPIDURAL; INFILTRATION at 07:23

## 2023-08-11 RX ADMIN — ROPIVACAINE HYDROCHLORIDE 5 ML: 2 INJECTION, SOLUTION EPIDURAL; INFILTRATION at 07:26

## 2023-08-11 RX ADMIN — ONDANSETRON 4 MG: 2 INJECTION INTRAMUSCULAR; INTRAVENOUS at 08:29

## 2023-08-11 RX ADMIN — LIDOCAINE HYDROCHLORIDE,EPINEPHRINE BITARTRATE 5 ML: 20; .005 INJECTION, SOLUTION EPIDURAL; INFILTRATION; INTRACAUDAL; PERINEURAL at 08:25

## 2023-08-11 RX ADMIN — CLINDAMYCIN IN 5 PERCENT DEXTROSE 900 MG: 18 INJECTION, SOLUTION INTRAVENOUS at 08:35

## 2023-08-11 RX ADMIN — GENTAMICIN SULFATE 120 MG: 60 INJECTION, SOLUTION INTRAVENOUS at 08:35

## 2023-08-11 RX ADMIN — LIDOCAINE HYDROCHLORIDE,EPINEPHRINE BITARTRATE 5 ML: 20; .005 INJECTION, SOLUTION EPIDURAL; INFILTRATION; INTRACAUDAL; PERINEURAL at 08:21

## 2023-08-11 RX ADMIN — NOREPINEPHRINE BITARTRATE 10 MCG: 1 INJECTION, SOLUTION, CONCENTRATE INTRAVENOUS at 08:46

## 2023-08-11 RX ADMIN — FENTANYL CITRATE 100 MCG: 50 INJECTION INTRAMUSCULAR; INTRAVENOUS at 08:34

## 2023-08-11 RX ADMIN — ACETAMINOPHEN 325MG 975 MG: 325 TABLET ORAL at 23:39

## 2023-08-11 RX ADMIN — KETOROLAC TROMETHAMINE 15 MG: 30 INJECTION, SOLUTION INTRAMUSCULAR; INTRAVENOUS at 12:11

## 2023-08-11 RX ADMIN — SODIUM CHLORIDE, SODIUM LACTATE, POTASSIUM CHLORIDE, AND CALCIUM CHLORIDE 125 ML/HR: .6; .31; .03; .02 INJECTION, SOLUTION INTRAVENOUS at 07:09

## 2023-08-11 RX ADMIN — KETOROLAC TROMETHAMINE 15 MG: 30 INJECTION, SOLUTION INTRAMUSCULAR; INTRAVENOUS at 22:09

## 2023-08-11 RX ADMIN — ACETAMINOPHEN 325MG 975 MG: 325 TABLET ORAL at 12:11

## 2023-08-11 RX ADMIN — LIDOCAINE HYDROCHLORIDE,EPINEPHRINE BITARTRATE 5 ML: 20; .005 INJECTION, SOLUTION EPIDURAL; INFILTRATION; INTRACAUDAL; PERINEURAL at 08:30

## 2023-08-11 RX ADMIN — SODIUM CHLORIDE, SODIUM LACTATE, POTASSIUM CHLORIDE, AND CALCIUM CHLORIDE 125 ML/HR: .6; .31; .03; .02 INJECTION, SOLUTION INTRAVENOUS at 15:09

## 2023-08-11 RX ADMIN — ROPIVACAINE HYDROCHLORIDE 8 ML/HR: 2 INJECTION, SOLUTION EPIDURAL; INFILTRATION; PERINEURAL at 07:28

## 2023-08-11 RX ADMIN — MORPHINE SULFATE 2 MG: 2 INJECTION, SOLUTION INTRAMUSCULAR; INTRAVENOUS at 01:07

## 2023-08-11 RX ADMIN — ROPIVACAINE HYDROCHLORIDE: 2 INJECTION, SOLUTION EPIDURAL; INFILTRATION at 07:29

## 2023-08-11 RX ADMIN — AZITHROMYCIN 500 MG: 500 INJECTION, POWDER, LYOPHILIZED, FOR SOLUTION INTRAVENOUS at 08:24

## 2023-08-11 NOTE — ANESTHESIA PREPROCEDURE EVALUATION
Procedure:  LABOR ANALGESIA    Relevant Problems   GYN   (+) 36 weeks gestation of pregnancy      HEMATOLOGY   (+) Anemia during pregnancy      PULMONARY   (+) Asthma during pregnancy      Other   (+) Obesity affecting pregnancy        Physical Exam    Airway    Mallampati score: II  TM Distance: >3 FB  Neck ROM: full     Dental       Cardiovascular  Cardiovascular exam normal    Pulmonary  Pulmonary exam normal     Other Findings        Anesthesia Plan  ASA Score- 3     Anesthesia Type- epidural with ASA Monitors. Additional Monitors:   Airway Plan:           Plan Factors-    Chart reviewed. Existing labs reviewed. Patient summary reviewed. Induction- intravenous. Postoperative Plan-     Informed Consent- Anesthetic plan and risks discussed with patient.

## 2023-08-11 NOTE — PROGRESS NOTES
Category II tracing noted. To bedside with team to discuss tracing, minimal progress, and prolonged ROM. Discussed resuscitative measures. Tracing not relieved with respositioning. IVF increased, ongoing eval for response. Discussed watchful waiting (with c/s if not responding) vs  now. Discussed risks of  -- bleeding, infection, injury to bowel/bladder/neurovasc/fetus, need for hysterectomy, or inc risk for future pregnancies. Patient had all questions answered to her satisfaction. Would prefer to proceed with  at this time.     OR and anesthesia aware  Clinda/gent/azithro ordered

## 2023-08-11 NOTE — OB LABOR/OXYTOCIN SAFETY PROGRESS
Labor Progress Note - Zaid Everett 29 y.o. female MRN: [id number]    Unit/Bed#: L&D 322-01 Encounter: [id number]       Contraction Frequency (minutes): occasional, difficult to trace  Contraction Quality: Mild  Tachysystole: No   Cervical Dilation: 1        Cervical Effacement: 70  Fetal Station: -2  Baseline Rate: 135 bpm  Fetal Heart Rate: 150 BPM  FHR Category: Category I               Vital Signs:   Vitals:    08/11/23 0212   BP: 122/72   Pulse: 78   Resp:    Temp: 99.1 °F (37.3 °C)   SpO2:        Notes/comments:   SVE as above. Concern for toco not picking up contractions.    Will readjust before deciding next step in her induction         Jose Grimm MD 8/11/2023 6:12 AM

## 2023-08-11 NOTE — ANESTHESIA PROCEDURE NOTES
Epidural Block    Patient location during procedure: OB  Start time: 8/11/2023 7:22 AM  Reason for block: at surgeon's request and primary anesthetic  Staffing  Performed by: Rosa Mark MD  Authorized by:  Rosa Mark MD    Preanesthetic Checklist  Completed: patient identified, IV checked, site marked, risks and benefits discussed, surgical consent, monitors and equipment checked, pre-op evaluation and timeout performed  Epidural  Patient position: sitting  Prep: Betadine  Patient monitoring: frequent blood pressure checks  Approach: midline  Location: lumbar  Injection technique: ALEXEY saline  Needle  Needle type: Tuohy   Needle gauge: 18 G  Catheter type: side hole  Catheter size: 20 G  Catheter securement method: clear occlusive dressing  Test dose: negative  Assessment  Sensory level: T10  Number of attempts: 1negative aspiration for CSF, negative aspiration for heme and no paresthesia on injection  patient tolerated the procedure well with no immediate complications

## 2023-08-11 NOTE — OP NOTE
OPERATIVE REPORT  PATIENT NAME: Keaton Ortega    :  1990  MRN: [id number]  Pt Location: [facility] L&D OR ROOM 01    SURGERY DATE: 2023    Surgeon(s) and Role:     * Karrie Pederson MD - Primary     * Barbie Keys MD - Jackelin Lozada MD - Assisting    Preop Diagnosis:  1. 36 weeks pregnant  2.  premature rupture of membranes  3. IUGR  4. BMI 43  5. GBS+  6. Anemia    * No Diagnosis Codes entered *    Procedure(s) (LRB):   SECTION () (N/A)    Specimen(s):  ID Type Source Tests Collected by Time Destination   1 :  Tissue (Placenta on Hold) OB Only Placenta TISSUE EXAM OB (PLACENTA) ONLY Karrie Pederson MD 2023 [id number]    A :  Cord Blood Cord BLOOD GAS, VENOUS, CORD Karrie Pederson MD 2023 [id number]    B :  Cord Blood Cord BLOOD GAS, ARTERIAL, CORD Karrie Pederson MD 2023 1607        Surgical QBL:  Surgical QBL (mL): 154 mL      Drains:  Urethral Catheter 16 Fr. (Active)   Number of days: 0       Anesthesia Type:   Epidural     Section Procedure Note    Indications:   Non-reassuring fetal heart tones    Pre-operative Diagnosis:   36w3d pregnancy   premature rupture of membranes  IUGR  BMI 43  GBS+  Anemia    Post-operative Diagnosis:   Same, s/p 1LTCS    Geovanni Group Classification System:   No Multiple pregnancy, No Transverse or oblique lie, No Breech lie, Gestational age is < 37 weeks +  is GEOVANNI GROUP 10    Attending: Karrie Pederson MD  Resident: Holli Ahumada, MD    Maternal Findings:  Grossly normal appearing uterus with thin lower uterine segment  Normal tubes and ovaries bilaterally  No adhesions  Bilateral rectus muscles hemostatic upon closure  No difficulty noted from skin to delivery     Findings:  Viable male weighing 4lbs 9oz;  Apgar scores of 4 at one minute and 8 at five minutes and 9 at 10 minutes.   Clear amniotic fluid  Normal placenta with 3-vessel cord    Arterial and Venous Gases:  Umbilical Cord Venous Blood Gas:  Results from last 7 days   Lab Units 23  0843   PH COV  7.236   PCO2 COV mm HG 57.8*   HCO3 COV mmol/L 24.0   BASE EXC COV mmol/L -4.5*   O2 CT CD VB mL/dL 6.9   O2 HGB, VENOUS CORD % 46.2     Umbilical Cord Arterial Blood Gas:  Results from last 7 days   Lab Units 23  0843   PH COA  7.224*   PCO2 COA  46.2   PO2 COA mm HG 10.8   HCO3 COA mmol/L 18.7   BASE EXC COA mmol/L -8.7*   O2 CONTENT CORD ART ml/dl 2.2   O2 HGB, ARTERIAL CORD % 13.7       Specimens: Arterial and venous cord gases, cord blood, segment of umbilical cord, placenta to storage    Quantitative Blood Loss: Surgical QBL (mL): 154 mL    Drains: Smith catheter           Complications:  None; patient tolerated the procedure well. Disposition: PACU            Condition: stable    Procedure Details   The patient was seen prior to the procedure. Risks, benefits, possible complications, alternate treatment options, and expected outcomes were discussed with the patient. The patient agreed with the proposed plan and gave informed consent for a primary low transverse  section. The patient was taken to the [facility] Operating Room at 0830 where she received a rebolus of her epidural. For infection prophylaxis, she received 500 mg zithromax, 900 mg clindamycin, and 5 mg/kg gentamicin preoperatively. Fetal heart tones in the OR were assessed and noted to be within normal limits and a Smith catheter and SCDs were placed. The abdomen was prepped with Chloraprep, the vagina was prepped with Chlorhexidine, and following appropriate drying time, the patient was draped in the usual sterile manner. A time-out was held and the above information confirmed. The patient was identified as Glendon Boast and the procedure verified as a  Delivery for non-reassuring fetal heart tones.     A Pfannenstiel incision was made and carried down through the underlying subcutaneous tissue to the fascia using a scalpel. Fascia was nicked in the midline and then proceeded in a Willow Reges fashion. The rectus muscles were  and the peritoneum was identified, entered, and extended longitudinally with blunt dissection. The bladder blade was inserted. A low transverse uterine incision was made with the scalpel and extended laterally with blunt dissection. The amnion was entered bluntly for clear fluid. The fetal head was palpated, elevated, and delivered through the uterine incision followed by the body without difficulty. Time of birth was noted at 46. There was noted to be spontaneous cry and good tone. There was no apparent injury to the . The umbilical cord was doubly clamped and cut. Delayed cord clamping was not performed to expedite hand off of  to  providers; the infant was subsequently handed off to the  providers in the setting of pre-operative non-reassuring fetal heart tones. Arterial and venous cord gases, cord blood, and a segment of umbilical cord were obtained for evaluation. The placenta delivered spontaneously at 7920 with uterine fundal massage and appeared normal. The uterus was exteriorized and cleaned out with a moist lap sponge. The uterine incision was closed with a running locked suture of 0 Vicryl. A second layer of the same suture was used to imbricate the first in a horizontal fashion. Hemostasis was noted to be excellent. The uterus was returned to the abdomen. The paracolic gutters were inspected and cleared of all clots and debris with irrigation and moist lap sponges. Bilateral rectus muscles were observed to be hemostatic during closure. The fascia was closed with a running suture of 0 Vicryl. Subcutaneous adipose tissue was closed with a running suture of 3-0 Plain gut. The skin was closed with a subcuticular running suture of 4-0 Stratafix. Exofin was applied. The uterine fundus was appreciated to be firm at the completion of closure. Minimal vaginal bleeding was appreciated upon fundal pressure. The patient appeared to tolerate the procedure very well. Lap sponge, needle, and instrument counts were correct x2. The patient's fundus was palpated and the uterus was expressed. She was then cleaned and transferred to her postpartum recovery room in stable condition and her infant went to the  nursery.       Izzy Valdez MD  OB/GYN PGY-3  2023 9:52 AM

## 2023-08-11 NOTE — PLAN OF CARE
Problem: POSTPARTUM  Goal: Experiences normal postpartum course  Description: INTERVENTIONS:  - Monitor maternal vital signs  - Assess uterine involution and lochia  2023 by Kelton Smith RN  Outcome: Progressing  2023 by Kelton Smith RN  Outcome: Progressing  Goal: Appropriate maternal -  bonding  Description: INTERVENTIONS:  - Identify family support  - Assess for appropriate maternal/infant bonding   -Encourage maternal/infant bonding opportunities  - Referral to  or  as needed  2023 1133 by Kelton Smith RN  Outcome: Progressing  2023 by Kelton Smith RN  Outcome: Progressing  Goal: Establishment of infant feeding pattern  Description: INTERVENTIONS:  - Assess breast/bottle feeding  - Refer to lactation as needed  2023 by Kelton Smith RN  Outcome: Progressing  2023 by Kelton Smith RN  Outcome: Progressing  Goal: Incision(s), wounds(s) or drain site(s) healing without S/S of infection  Description: INTERVENTIONS  - Assess and document dressing, incision, wound bed, drain sites and surrounding tissue  - Provide patient and family education  - Perform skin care/dressing changes every   2023 by Kelton Smith RN  Outcome: Progressing  2023 by Kelton Smith RN  Outcome: Progressing

## 2023-08-11 NOTE — ANESTHESIA POSTPROCEDURE EVALUATION
Post-Op Assessment Note    CV Status:  Stable    Pain management: adequate     Mental Status:  Awake   Hydration Status:  Stable   PONV Controlled:  Controlled   Airway Patency:  Patent      Post Op Vitals Reviewed: Yes      Staff: Anesthesiologist     Post-op block assessment: catheter intact and no complications      No notable events documented.     BP      Temp      Pulse     Resp      SpO2      BP 96/52 (BP Location: Left arm)   Pulse 83   Temp 97.7 °F (36.5 °C) (Oral)   Resp 18   Ht 5' 2" (1.575 m)   Wt 106 kg (233 lb 11 oz)   LMP 11/16/2022   SpO2 98%   Breastfeeding Unknown   BMI 42.74 kg/m²

## 2023-08-11 NOTE — PROGRESS NOTES
Kenan Alcantara  [id number]  8/11/2023  5:47 PM    POST-OP CHECK      S:  Kenan Alcantara doing well. Pain controlled. Denies nausea/vomiting. Denies chest pain, shortness of breath. No complaints at this time. O:  Vitals:    08/11/23 1500   BP: 93/51   Pulse: 70   Resp: 14   Temp: 97.8 °F (36.6 °C)   SpO2: 97%       Physical Exam  Constitutional:       Appearance: Normal appearance. Cardiovascular:      Rate and Rhythm: Normal rate. Pulmonary:      Effort: Pulmonary effort is normal.      Breath sounds: Normal breath sounds. Abdominal:      Palpations: Abdomen is soft. Tenderness: There is no abdominal tenderness. Neurological:      Mental Status: She is alert. A:  Kenan Alcantara is s/p 1LTCS in setting of nonreassuring fetal heart tracing. P:  Routine postop check  IV/PO pain meds as needed  Regular diet as tolerated  Antiemetics for nausea/vomiting prn  Follow up am labs  Adequate UOP, continue to monitor  SCDs for DVT ppx, encourage ambulation as tolerated.     Yared Kimball MD  OB/GYN  8/11/2023  5:47 PM

## 2023-08-11 NOTE — OB LABOR/OXYTOCIN SAFETY PROGRESS
Labor Progress Note - Zaid Everett 29 y.o. female MRN: [id number]    Unit/Bed#: L&D 322-01 Encounter: [id number]       Contraction Frequency (minutes): irregular  Contraction Quality: Mild  Tachysystole: No   Cervical Dilation: 1        Cervical Effacement: 20  Fetal Station: -3  Baseline Rate: 145 bpm  Fetal Heart Rate: 150 BPM  FHR Category: Category I               Vital Signs:   Vitals:    08/10/23 2100   BP: 116/67   Pulse: 88   Resp:    Temp:    SpO2:        Notes/comments:   SVE as above. Will give third dose of PO cytotec  Discussed with Dr. Avalos Artist.           Jose Grimm MD 8/11/2023 12:55 AM

## 2023-08-12 PROBLEM — Z98.891 S/P PRIMARY LOW TRANSVERSE C-SECTION: Status: ACTIVE | Noted: 2023-08-12

## 2023-08-12 LAB
ERYTHROCYTE [DISTWIDTH] IN BLOOD BY AUTOMATED COUNT: 13.4 % (ref 11.6–15.1)
GP B STREP DNA SPEC QL NAA+PROBE: ABNORMAL
GP B STREP DNA SPEC QL NAA+PROBE: ABNORMAL
HCT VFR BLD AUTO: 26.2 % (ref 34.8–46.1)
HGB BLD-MCNC: 8.8 G/DL (ref 11.5–15.4)
MCH RBC QN AUTO: 32.7 PG (ref 26.8–34.3)
MCHC RBC AUTO-ENTMCNC: 33.6 G/DL (ref 31.4–37.4)
MCV RBC AUTO: 97 FL (ref 82–98)
PLATELET # BLD AUTO: 201 THOUSANDS/UL (ref 149–390)
PMV BLD AUTO: 9.5 FL (ref 8.9–12.7)
RBC # BLD AUTO: 2.69 MILLION/UL (ref 3.81–5.12)
WBC # BLD AUTO: 8.5 THOUSAND/UL (ref 4.31–10.16)

## 2023-08-12 PROCEDURE — 85027 COMPLETE CBC AUTOMATED: CPT | Performed by: OBSTETRICS & GYNECOLOGY

## 2023-08-12 PROCEDURE — 99024 POSTOP FOLLOW-UP VISIT: CPT | Performed by: OBSTETRICS & GYNECOLOGY

## 2023-08-12 RX ORDER — IBUPROFEN 600 MG/1
600 TABLET ORAL EVERY 6 HOURS PRN
Status: DISCONTINUED | OUTPATIENT
Start: 2023-08-12 | End: 2023-08-14 | Stop reason: HOSPADM

## 2023-08-12 RX ADMIN — VITAM B12 50 MCG: 100 TAB at 09:41

## 2023-08-12 RX ADMIN — IBUPROFEN 600 MG: 600 TABLET ORAL at 13:19

## 2023-08-12 RX ADMIN — IBUPROFEN 600 MG: 600 TABLET ORAL at 19:56

## 2023-08-12 RX ADMIN — IRON SUCROSE 200 MG: 20 INJECTION, SOLUTION INTRAVENOUS at 09:28

## 2023-08-12 RX ADMIN — ENOXAPARIN SODIUM 40 MG: 100 INJECTION SUBCUTANEOUS at 09:29

## 2023-08-12 RX ADMIN — FERROUS SULFATE TAB 325 MG (65 MG ELEMENTAL FE) 325 MG: 325 (65 FE) TAB at 09:39

## 2023-08-12 RX ADMIN — DOCUSATE SODIUM 100 MG: 100 CAPSULE, LIQUID FILLED ORAL at 09:29

## 2023-08-12 RX ADMIN — PRENATAL VIT W/ FE FUMARATE-FA TAB 27-0.8 MG 1 TABLET: 27-0.8 TAB at 09:29

## 2023-08-12 RX ADMIN — KETOROLAC TROMETHAMINE 15 MG: 30 INJECTION, SOLUTION INTRAMUSCULAR; INTRAVENOUS at 04:03

## 2023-08-12 RX ADMIN — DOCUSATE SODIUM 100 MG: 100 CAPSULE, LIQUID FILLED ORAL at 17:30

## 2023-08-12 RX ADMIN — ACETAMINOPHEN 975 MG: 325 TABLET ORAL at 17:30

## 2023-08-12 RX ADMIN — FLUTICASONE FUROATE 1 PUFF: 200 POWDER RESPIRATORY (INHALATION) at 09:38

## 2023-08-12 RX ADMIN — ACETAMINOPHEN 325MG 975 MG: 325 TABLET ORAL at 06:24

## 2023-08-12 NOTE — ASSESSMENT & PLAN NOTE
, Hgb 9.9 --> 8.8 , s/p venofer  Voiding spontaneously  Pain: Tylenol and motrin scheduled, jad 5/10 PRN    FEN: Tolerating regular diet  DVT ppx: SCDs and Lovenox 40mg BID  Passing flatus   Incision C/D/I   Dispo: anticipate today vs. tomorrow, pending baby hearing test

## 2023-08-12 NOTE — PROGRESS NOTES
Progress Note - OB/GYN  Peace Aleman 29 y.o. female MRN: [id number]  Unit/Bed#: L&D 311-01 Encounter: [id number]    Assessment and [address] Sanabria is a patient of: [address] [place] [address]. She is POD# 1 s/p 1LTCS after PPROM at 36w. Recovering well and is stable       GBS+  Assessment & Plan  Patient reports anaphylactic reaction to penicillin when she was young  GBS sensitivities pending  Vancomycin 2g q8h ordered    Anemia during pregnancy  Assessment & Plan   Hgb 10.0   Admission Hgb 9.9      36 weeks gestation of pregnancy  Assessment & Plan  Betamethasone complete -    Obesity affecting pregnancy  Assessment & Plan  BMI 42.74  Plan for PP DVT ppx: SCDs and lovenox 40 mg BID     * S/P primary low transverse   Assessment & Plan  , Hgb 9.9 --> 8.8 , venofer ordered  Lines: rose removed, for void trial today   Pain: Tylenol and toradol scheduled, jad 5/10 PRN    FEN: Tolerating regular diet  DVT ppx: SCDs and Lovenox 40mg BID  Passing flatus   Incision C/D/I           Disposition    - Anticipate discharge home on POD# 2 vs 3      Subjective/Objective     Chief Complaint: Postoperative State     Subjective:    Adina Reyez is s/p 1LTCS. She is POD# 1. She has no current complaints. Pain is well controlled. Patient is not currently voiding. She is not ambulating. Patient is currently passing flatus and has had no bowel movement. She is tolerating PO, and denies nausea or vomitting. Patient denies fever, chills, chest pain, shortness of breath, or calf tenderness. Lochia is normal. She is  Bottle feeding. Her incision is C/D/I. She is recovering well and is stable.        Vitals:   BP 97/54 (BP Location: Left arm)   Pulse 79   Temp 98.7 °F (37.1 °C) (Oral)   Resp 16   Ht 5' 2" (1.575 m)   Wt 106 kg (233 lb 11 oz)   LMP 2022   SpO2 97%   Breastfeeding No   BMI 42.74 kg/m²       Intake/Output Summary (Last 24 hours) at 8/12/2023 2883  Last data filed at 8/12/2023 0530  Gross per 24 hour   Intake 3428.26 ml   Output 1654 ml   Net 1774.26 ml       Invasive Devices     Peripheral Intravenous Line  Duration           Peripheral IV 08/10/23 Right Hand 1 day                Physical Exam:   GEN: Becky Ryan appears well, alert, pleasant and cooperative   CARDIO: RRR  RESP:  Normal respiratory effort  ABDOMEN: soft, no tenderness, no distention, fundus firm, Incision C/D/I  EXTREMITIES: SCDs on, Negative Eliz's sign bilaterally    Labs:     Hemoglobin   Date Value Ref Range Status   08/12/2023 8.8 (L) 11.5 - 15.4 g/dL Final   08/10/2023 9.9 (L) 11.5 - 15.4 g/dL Final     WBC   Date Value Ref Range Status   08/12/2023 8.50 4.31 - 10.16 Thousand/uL Final   08/10/2023 5.55 4.31 - 10.16 Thousand/uL Final     Platelets   Date Value Ref Range Status   08/12/2023 201 149 - 390 Thousands/uL Final   08/10/2023 233 149 - 390 Thousands/uL Final     Creatinine   Date Value Ref Range Status   08/10/2023 0.54 (L) 0.60 - 1.30 mg/dL Final     Comment:     Standardized to IDMS reference method   08/01/2022 0.76 0.60 - 1.20 mg/dL Final     Comment:     Standardized to IDMS reference method     AST   Date Value Ref Range Status   08/10/2023 19 13 - 39 U/L Final   08/01/2022 24 14 - 36 U/L Final     Comment:     Specimen collection should occur prior to Sulfasalazine administration due to the potential for falsely depressed results. ALT   Date Value Ref Range Status   08/10/2023 16 7 - 52 U/L Final     Comment:     Specimen collection should occur prior to Sulfasalazine administration due to the potential for falsely depressed results. 08/01/2022 25 <35 U/L Final     Comment:     Specimen collection should occur prior to Sulfasalazine administration due to the potential for falsely depressed results.            Tiffany Stone MD  OBGYN PGY2  8/12/2023  6:38 AM

## 2023-08-13 PROCEDURE — 99024 POSTOP FOLLOW-UP VISIT: CPT | Performed by: OBSTETRICS & GYNECOLOGY

## 2023-08-13 RX ADMIN — DOCUSATE SODIUM 100 MG: 100 CAPSULE, LIQUID FILLED ORAL at 17:59

## 2023-08-13 RX ADMIN — ACETAMINOPHEN 975 MG: 325 TABLET ORAL at 01:30

## 2023-08-13 RX ADMIN — IBUPROFEN 600 MG: 600 TABLET ORAL at 21:32

## 2023-08-13 RX ADMIN — IBUPROFEN 600 MG: 600 TABLET ORAL at 02:34

## 2023-08-13 RX ADMIN — OXYCODONE HYDROCHLORIDE 10 MG: 5 TABLET ORAL at 00:13

## 2023-08-13 RX ADMIN — ENOXAPARIN SODIUM 40 MG: 100 INJECTION SUBCUTANEOUS at 09:40

## 2023-08-13 RX ADMIN — VITAM B12 50 MCG: 100 TAB at 09:40

## 2023-08-13 RX ADMIN — IBUPROFEN 600 MG: 600 TABLET ORAL at 14:41

## 2023-08-13 RX ADMIN — FERROUS SULFATE TAB 325 MG (65 MG ELEMENTAL FE) 325 MG: 325 (65 FE) TAB at 08:02

## 2023-08-13 RX ADMIN — ACETAMINOPHEN 975 MG: 325 TABLET ORAL at 17:59

## 2023-08-13 RX ADMIN — FLUTICASONE FUROATE 1 PUFF: 200 POWDER RESPIRATORY (INHALATION) at 09:40

## 2023-08-13 RX ADMIN — DOCUSATE SODIUM 100 MG: 100 CAPSULE, LIQUID FILLED ORAL at 09:40

## 2023-08-13 RX ADMIN — PRENATAL VIT W/ FE FUMARATE-FA TAB 27-0.8 MG 1 TABLET: 27-0.8 TAB at 09:40

## 2023-08-13 RX ADMIN — ACETAMINOPHEN 975 MG: 325 TABLET ORAL at 09:59

## 2023-08-13 NOTE — PROGRESS NOTES
Progress Note - OB/GYN  Nita Manriquez 29 y.o. female MRN: [id number]  Unit/Bed#: L&D 311-01 Encounter: [id number]    Assessment and [address] Frank Sanabria is a patient of: [address] [place] [address]. She is POD# 2 s/p 1LTCS at 36w after PPROM. Recovering well and is stable       GBS+  Assessment & Plan  Treated with vancomycin during labor 2/2 allergy    Anemia during pregnancy  Assessment & Plan   Hgb 10.0   Admission Hgb 9.9      36 weeks gestation of pregnancy  Assessment & Plan  Betamethasone complete -    Obesity affecting pregnancy  Assessment & Plan  BMI 42.74  Plan for PP DVT ppx: SCDs and lovenox 40 mg BID     * S/P primary low transverse   Assessment & Plan  , Hgb 9.9 --> 8.8 , venofer ordered  Voiding spontaneously  Pain: Tylenol and motrin scheduled, jad 5/10 PRN    FEN: Tolerating regular diet  DVT ppx: SCDs and Lovenox 40mg BID  Passing flatus   Incision C/D/I           Disposition    - Anticipate discharge home today      Subjective/Objective     Chief Complaint: Postoperative State     Subjective:    Adina Chopra is s/p 1LTCS. She is POD# 2. She has no current complaints. Pain is well controlled. Patient is currently voiding. She is ambulating. Patient is currently passing flatus and has had no bowel movement. She is tolerating PO, and denies nausea or vomitting. Patient denies fever, chills, chest pain, shortness of breath, or calf tenderness. Lochia is normal. She is  Bottle feeding. Her incision is C/D/I. She is recovering well and is stable.        Vitals:   /64 (BP Location: Right arm)   Pulse 70   Temp 97.7 °F (36.5 °C) (Oral)   Resp 16   Ht 5' 2" (1.575 m)   Wt 106 kg (233 lb 11 oz)   LMP 2022   SpO2 98%   Breastfeeding No   BMI 42.74 kg/m²       Intake/Output Summary (Last 24 hours) at 2023 0925  Last data filed at 2023 1945  Gross per 24 hour   Intake --   Output 825 ml   Net -825 ml Invasive Devices     Peripheral Intravenous Line  Duration           Peripheral IV 08/10/23 Right Hand 2 days                Physical Exam:   GEN: Christina Mccormick appears well, alert, pleasant and cooperative   CARDIO: RRR  RESP:  Normal respiratory effort  ABDOMEN: soft, no tenderness, no distention, fundus firm, Incision C/D/I  EXTREMITIES: SCDs on, Negative Eliz's sign bilaterally      Labs:     Hemoglobin   Date Value Ref Range Status   08/12/2023 8.8 (L) 11.5 - 15.4 g/dL Final   08/10/2023 9.9 (L) 11.5 - 15.4 g/dL Final     WBC   Date Value Ref Range Status   08/12/2023 8.50 4.31 - 10.16 Thousand/uL Final   08/10/2023 5.55 4.31 - 10.16 Thousand/uL Final     Platelets   Date Value Ref Range Status   08/12/2023 201 149 - 390 Thousands/uL Final   08/10/2023 233 149 - 390 Thousands/uL Final     Creatinine   Date Value Ref Range Status   08/10/2023 0.54 (L) 0.60 - 1.30 mg/dL Final     Comment:     Standardized to IDMS reference method   08/01/2022 0.76 0.60 - 1.20 mg/dL Final     Comment:     Standardized to IDMS reference method     AST   Date Value Ref Range Status   08/10/2023 19 13 - 39 U/L Final   08/01/2022 24 14 - 36 U/L Final     Comment:     Specimen collection should occur prior to Sulfasalazine administration due to the potential for falsely depressed results. ALT   Date Value Ref Range Status   08/10/2023 16 7 - 52 U/L Final     Comment:     Specimen collection should occur prior to Sulfasalazine administration due to the potential for falsely depressed results. 08/01/2022 25 <35 U/L Final     Comment:     Specimen collection should occur prior to Sulfasalazine administration due to the potential for falsely depressed results.            Chitra Lakhani MD  OBGYN PGY2  8/13/2023  7:22 AM

## 2023-08-14 VITALS
HEART RATE: 74 BPM | BODY MASS INDEX: 43 KG/M2 | OXYGEN SATURATION: 99 % | SYSTOLIC BLOOD PRESSURE: 126 MMHG | TEMPERATURE: 98 F | RESPIRATION RATE: 18 BRPM | HEIGHT: 62 IN | DIASTOLIC BLOOD PRESSURE: 72 MMHG | WEIGHT: 233.69 LBS

## 2023-08-14 PROCEDURE — 99024 POSTOP FOLLOW-UP VISIT: CPT | Performed by: OBSTETRICS & GYNECOLOGY

## 2023-08-14 RX ORDER — OXYCODONE HYDROCHLORIDE 5 MG/1
5 TABLET ORAL EVERY 4 HOURS PRN
Qty: 6 TABLET | Refills: 0 | Status: SHIPPED | OUTPATIENT
Start: 2023-08-14 | End: 2023-08-24

## 2023-08-14 RX ORDER — ACETAMINOPHEN 325 MG/1
975 TABLET ORAL EVERY 8 HOURS SCHEDULED
Qty: 21 TABLET | Refills: 0
Start: 2023-08-14 | End: 2023-08-17

## 2023-08-14 RX ORDER — DOCUSATE SODIUM 100 MG/1
100 CAPSULE, LIQUID FILLED ORAL 2 TIMES DAILY
Refills: 0
Start: 2023-08-14

## 2023-08-14 RX ORDER — IBUPROFEN 600 MG/1
600 TABLET ORAL EVERY 6 HOURS PRN
Qty: 50 TABLET | Refills: 0 | Status: SHIPPED | OUTPATIENT
Start: 2023-08-14 | End: 2023-09-13

## 2023-08-14 RX ADMIN — PRENATAL VIT W/ FE FUMARATE-FA TAB 27-0.8 MG 1 TABLET: 27-0.8 TAB at 09:47

## 2023-08-14 RX ADMIN — ACETAMINOPHEN 975 MG: 325 TABLET ORAL at 09:47

## 2023-08-14 RX ADMIN — FLUTICASONE FUROATE 1 PUFF: 200 POWDER RESPIRATORY (INHALATION) at 09:47

## 2023-08-14 RX ADMIN — IBUPROFEN 600 MG: 600 TABLET ORAL at 07:40

## 2023-08-14 RX ADMIN — ENOXAPARIN SODIUM 40 MG: 100 INJECTION SUBCUTANEOUS at 09:47

## 2023-08-14 RX ADMIN — ACETAMINOPHEN 975 MG: 325 TABLET ORAL at 02:03

## 2023-08-14 RX ADMIN — VITAM B12 50 MCG: 100 TAB at 09:47

## 2023-08-14 RX ADMIN — DOCUSATE SODIUM 100 MG: 100 CAPSULE, LIQUID FILLED ORAL at 09:47

## 2023-08-14 RX ADMIN — FERROUS SULFATE TAB 325 MG (65 MG ELEMENTAL FE) 325 MG: 325 (65 FE) TAB at 07:40

## 2023-08-14 NOTE — PROGRESS NOTES
Obstetrics Progress Note  Christina Mccormick 29 y.o. female MRN: [id number]  Unit/Bed#: L&D 311-01 Encounter: [id number]    Assessment/Plan:    Postpartum Day #3 s/p 1LTCS delivery, currently stable. Baby in room. By issue:  * S/P primary low transverse   Assessment & Plan  , Hgb 9.9 --> 8.8 , s/p venofer  Voiding spontaneously  Pain: Tylenol and motrin scheduled, jad 5/10 PRN    FEN: Tolerating regular diet  DVT ppx: SCDs and Lovenox 40mg BID  Passing flatus   Incision C/D/I   Dispo: anticipate today vs. tomorrow, pending baby hearing test        Anemia during pregnancy  Assessment & Plan  HgB 9.9 > 8.8   S/p venofer  Asymptomatic      Obesity affecting pregnancy  Assessment & Plan  BMI 42.74  Plan for PP DVT ppx: SCDs and lovenox 40 mg BID       Subjective/Objective     Subjective:   No acute events overnight. Pain: controlled. Tolerating PO: yes. Voiding: yes. Flatus: yes. Ambulating: yes. Chest pain: no. Shortness of breath: no. Leg pain: no. Lochia: within normal limits. Baby in room, feeding via breast.    Objective:   Vitals:   Temp:  [97.7 °F (36.5 °C)-98.9 °F (37.2 °C)] 98.8 °F (37.1 °C)  HR:  [70-97] 75  Resp:  [16-18] 18  BP: (105-134)/(64-79) 105/66     No intake or output data in the 24 hours ending 23 0659    Lab Results   Component Value Date    WBC 8.50 2023    HGB 8.8 (L) 2023    HCT 26.2 (L) 2023    MCV 97 2023     2023       Physical Exam:   General: alert and oriented x3, in no apparent distress  Cardiovascular: regular rate  Pulmonary: normal effort  Abdomen: Soft, non-tender, non-distended, no rebound or guarding.  Uterine fundus firm and non-tender, at the umbilicus  Incision: clean/dry/intact  Extremities: Non tender with no edema      Jayshree Waggoner MD  PGY-I, OBGYN  2023, 6:56 AM

## 2023-08-14 NOTE — PLAN OF CARE
Problem: POSTPARTUM  Goal: Experiences normal postpartum course  Description: INTERVENTIONS:  - Monitor maternal vital signs  - Assess uterine involution and lochia  2023 by Derek Byrd RN  Outcome: Completed  2023 by Derek Byrd RN  Outcome: Progressing  Goal: Appropriate maternal -  bonding  Description: INTERVENTIONS:  - Identify family support  - Assess for appropriate maternal/infant bonding   -Encourage maternal/infant bonding opportunities  - Referral to  or  as needed  2023 by Derek Byrd RN  Outcome: Completed  2023 by Derek Byrd RN  Outcome: Progressing  Goal: Establishment of infant feeding pattern  Description: INTERVENTIONS:  - Assess breast/bottle feeding  - Refer to lactation as needed  2023 by Derek Byrd RN  Outcome: Completed  2023 by Derek Byrd RN  Outcome: Progressing  Goal: Incision(s), wounds(s) or drain site(s) healing without S/S of infection  Description: INTERVENTIONS  - Assess and document dressing, incision, wound bed, drain sites and surrounding tissue  - Provide patient and family education  - Perform skin care/dressing changes every   2023 by Derek Byrd RN  Outcome: Completed  2023 by Derek Byrd RN  Outcome: Progressing

## 2023-08-14 NOTE — DISCHARGE SUMMARY
Obstetrics Discharge Summary   Junella Hammans 29 y.o. female MRN: [id number]  Unit/Bed#: L&D 311-01 Encounter: [id number]    Admission Date: 8/10/2023     Discharge Date: 2023    Admitting Diagnoses:   Pregnancy at 36w  PPROM  FGR  Anemia    Discharge Diagnoses:   Same, delivered      Procedures:   primary  section, low transverse incision      Admitting Attending: Mabel Toth  Delivery Attending: Geronimo Couch  Discharge Attending: Avellini    Delivery  Route of Delivery: , Low Transverse   Reason for  delivery: Category II FHR Tracing       Anesthesia: Epidural ,     Delivery: , Low Transverse  at 2023  8:49 AM         Baby's Weight: 2070 g (4 lb 9 oz) ; 73.02     Apgar scores: 4  and 8  at 1 and 5 minutes, respectively    Hospital Course:   Junella Hammans is now a 29 y.o. Tammi Batch who was initially admitted at 36w3d for PPROM. Cytotec was given. She was noted to have a category II tracing. After minimal progress and prolonged ROM, options for proceeding were discussed with patient, and she chose to move forward with  delivery. She underwent un uncomplicated primary low transverse  section. The patient's post partum course was unremarkable. . Her preoperative hemoglobin was 9.9g/dL, postoperative was 8.8. She received a dose of venofer. Her postoperative pain was well controlled with oral analgesics. On day of discharge, she was ambulating and able to reasonably perform all ADLs. She was voiding and had appropriate bowel function. Pain was well controlled. She was discharged home on post-operative day #3 without complications. Patient was instructed to follow up with her OB as an outpatient and was given appropriate warnings to call provider if she develops signs of infection or uncontrolled pain. Mom's blood type is A positive, so RhoGAM was not indicated.       Complications:   None    Condition at discharge:   good     Provisions for Follow-Up Care:  See after visit summary for information related to follow-up care and any pertinent home health orders. Disposition:   Home    Planned Readmission:   No    Discharge Medications:   Please see AVS for a complete list of discharge medications. Discharge instructions :   Please see AVS for complete discharge instructions.       Vy Belcher MD  PGY-1 OBGYN

## 2023-08-15 PROCEDURE — 88307 TISSUE EXAM BY PATHOLOGIST: CPT | Performed by: STUDENT IN AN ORGANIZED HEALTH CARE EDUCATION/TRAINING PROGRAM

## 2023-08-15 NOTE — UTILIZATION REVIEW
BOTH MOTHER AND BABY DISCHARGED AUG 14    Notification of Maternity Inpatient Admission/Maternity Inpatient Authorization Request  This is a Notification of Maternity Inpatient Admission/Maternity Inpatient Authorization Request to our facility [address] [place] [address]. Please be advised that this patient is currently in our facility under Inpatient Status. Below you will find the Birth/[place] Summary, Attending Physician and Facility’s information including NPI#. and contact information for the Utilization Review Department where the patient is receiving care services. Facility: [address] [place] [address]  Address: [address]  Phone: [phone] Tax ID: [id number]  NPI: [id number]  Medicare ID: [id number]    Place of Service Code: 24   Place of Service Name: Inpatient Hospital  Presentation Date & Time: 8/10/2023  1:08 PM  Inpatient Admission Date & Time: 8/10/23  2:15 PM  Discharge Date & Time: 2023  2:08 PM   Discharge Disposition (if discharged): Home/Self Care  Attending Physician & NPI: Cookie Cerna Md [[id number]]    Mother of  Information: Nita Manriquez   MRN: [id number] YOB: 1990   Mother's Admitting Diagnosis: Vaginal discharge during pregnancy in third trimester [O26.893, N89.8]  Estimated Date of Delivery: 23  Type of Delivery: , Low Transverse    Delivering clinician: Cookie Cerna   OB History        1    Para   1    Term   0       1    AB   0    Living   1       SAB   0    IAB   0    Ectopic   0    Multiple   0    Live Births   1                Name & MRN:   Information for the patient's :  Violeta Hunt [[id number]]      Delivery Information:  Sex: male  Delivered 2023 8:49 AM by , Low Transverse; Gestational Age: 41w4d     Measurements:  Weight: 4 lb 9 oz (2070 g);   Height: 17.5"    APGAR 1 minute 5 minutes 10 minutes   Totals: 4 8 9     Thank you,  Pamela PÉREZ Jarett Maria Utilization Review Department  Phone: Jennifer Duffy. - [phone]; Fax [phone]  ATTENTION: Please call with any questions or concerns to [phone]  and carefully follow the prompts so that you are directed to the right person. Send all requests for admission clinical reviews, approved or denied determinations and any other requests to fax [phone].  All voicemails are confidential.

## 2023-08-22 ENCOUNTER — POSTPARTUM VISIT (OUTPATIENT)
Dept: OBGYN CLINIC | Facility: CLINIC | Age: 33
End: 2023-08-22

## 2023-08-22 VITALS
WEIGHT: 223.8 LBS | HEIGHT: 62 IN | HEART RATE: 76 BPM | DIASTOLIC BLOOD PRESSURE: 84 MMHG | SYSTOLIC BLOOD PRESSURE: 126 MMHG | BODY MASS INDEX: 41.18 KG/M2

## 2023-08-22 DIAGNOSIS — Z98.891 S/P PRIMARY LOW TRANSVERSE C-SECTION: ICD-10-CM

## 2023-08-22 PROBLEM — O99.820 GBS (GROUP B STREPTOCOCCUS CARRIER), +RV CULTURE, CURRENTLY PREGNANT: Status: RESOLVED | Noted: 2023-08-09 | Resolved: 2023-08-22

## 2023-08-22 PROBLEM — O36.5990 IUGR (INTRAUTERINE GROWTH RESTRICTION) AFFECTING CARE OF MOTHER: Status: RESOLVED | Noted: 2023-07-12 | Resolved: 2023-08-22

## 2023-08-22 PROBLEM — O99.019 ANEMIA DURING PREGNANCY: Status: RESOLVED | Noted: 2023-08-08 | Resolved: 2023-08-22

## 2023-08-22 PROBLEM — Z3A.36 36 WEEKS GESTATION OF PREGNANCY: Status: RESOLVED | Noted: 2023-01-31 | Resolved: 2023-08-22

## 2023-08-22 PROBLEM — J45.909 ASTHMA DURING PREGNANCY: Status: RESOLVED | Noted: 2023-01-31 | Resolved: 2023-08-22

## 2023-08-22 PROBLEM — O99.519 ASTHMA DURING PREGNANCY: Status: RESOLVED | Noted: 2023-01-31 | Resolved: 2023-08-22

## 2023-08-22 PROBLEM — O99.210 OBESITY AFFECTING PREGNANCY: Status: RESOLVED | Noted: 2023-01-31 | Resolved: 2023-08-22

## 2023-08-22 NOTE — PROGRESS NOTES
OB POSTPARTUM VISIT PROGRESS NOTE  Date of Encounter: 2023    Jeremy Montelongo    : 1990  (29 y.o.)  MR: [id number]    Constantine Yung is in for her postpartum visit. She is now . She delivered by primary  section. She's generally doing well, denies current pain or bleeding issues, and has no significant depression issues. [place] score is 0. Pt is assisted by FOB with the baby She is bottle feeding. We discussed all appropriate contraceptive options and she chooses . Ortho-Evra. Will start at 4 weeks postpartum, pt states she used birth control patches in the past  Denies problems with urinating or BM  Aware exercise and intercourse should not resume until 6 weeks postpartum    Lab  WNL PAP and HPV other positive 2023    Objective   EXAM:  GENERAL: alert, well appearing, and in no distress. VITALS: Blood pressure 126/84, pulse 76, height 5' 2" (1.575 m), weight 102 kg (223 lb 12.8 oz), last menstrual period 2022, not currently breastfeeding. BMI: Body mass index is 40.93 kg/m². WNL respiratory effort, negative cough or SOB  BREASTS: Denies pain, redness or lumps  ABDOMEN: NT, soft,  LTCS incision is healed, negative erythema or drainage  EXTREMITIES: Denies pain, redness or edema     Assessment/Plan   Diagnoses and all orders for this visit:    Postpartum follow-up    S/P primary low transverse         Plan  Patient Instructions   Call with needs or concerns  Return for 3 week postpartum visit  Annual GYN exam is due after 2024      Return in about 3 weeks (around 2023) for postpartum visit. Pt verbalized understanding of all discussed.     TAPAN Rodriguez

## 2023-08-22 NOTE — PATIENT INSTRUCTIONS
Call with needs or concerns  Return for 3 week postpartum visit  Annual GYN exam is due after 1/31/2024

## 2023-08-25 NOTE — UTILIZATION REVIEW
NOTIFICATION OF ADMISSION DISCHARGE   This is a Notification of Discharge from [facility]. Please be advised that this patient has been discharge from our facility. Below you will find the admission and discharge date and time including the patient’s disposition. UTILIZATION REVIEW CONTACT:  Gino Bhardwaj  Utilization   Network Utilization Review Department  Phone: [phone] x carefully listen to the prompts. All voicemails are confidential.  Email: [email or web address]     ADMISSION INFORMATION  PRESENTATION DATE: 8/10/2023  1:08 PM  OBERVATION ADMISSION DATE:   INPATIENT ADMISSION DATE: 8/10/23  2:15 PM   DISCHARGE DATE: 8/14/2023  2:08 PM   DISPOSITION:Home/Self Care    IMPORTANT INFORMATION:  Send all requests for admission clinical reviews, approved or denied determinations and any other requests to dedicated fax number below belonging to the campus where the patient is receiving treatment.  List of dedicated fax numbers:  [place] DENIALS (Administrative/Medical Necessity) [phone] [address] [facility] (Maternity/NICU/Pediatrics) [phone]   [facility] [phone]   [facility] [phone] [address] [phone]   [address] [place] [address] [phone]   [facility] [phone]   [address] [place] [address] [address] [place] [address] [phone]   [address] [place] [address] [phone]   [address] [facility] [phone] [address] [facility] [address] [phone]

## 2023-09-12 ENCOUNTER — POSTPARTUM VISIT (OUTPATIENT)
Dept: OBGYN CLINIC | Facility: CLINIC | Age: 33
End: 2023-09-12

## 2023-09-12 VITALS
DIASTOLIC BLOOD PRESSURE: 84 MMHG | BODY MASS INDEX: 40.85 KG/M2 | HEART RATE: 72 BPM | WEIGHT: 222 LBS | HEIGHT: 62 IN | SYSTOLIC BLOOD PRESSURE: 136 MMHG

## 2023-09-12 DIAGNOSIS — Z98.891 S/P PRIMARY LOW TRANSVERSE C-SECTION: ICD-10-CM

## 2023-09-12 DIAGNOSIS — Z30.016 ENCOUNTER FOR INITIAL PRESCRIPTION OF TRANSDERMAL PATCH HORMONAL CONTRACEPTIVE DEVICE: ICD-10-CM

## 2023-09-12 DIAGNOSIS — Z30.09 GENERAL COUNSELING AND ADVICE ON FEMALE CONTRACEPTION: ICD-10-CM

## 2023-09-12 PROCEDURE — 99024 POSTOP FOLLOW-UP VISIT: CPT | Performed by: NURSE PRACTITIONER

## 2023-09-12 NOTE — PROGRESS NOTES
OB POSTPARTUM VISIT PROGRESS NOTE  Date of Encounter: 2023    Lyndon Johnson    : 1990  (29 y.o.)  MR: [id number]    Subjective   Jessica Morfin is in for her postpartum visit. She is now . She delivered by primary  section. She's generally doing well, denies current pain or bleeding issues, and has no significant depression issues. [place] score was 0. Baby is assisted by FOB. She is bottle feeding. We discussed all appropriate contraceptive options and she chooses Ridge-Evra patches which she has used in the past. Plans to start 1 week prior to when she plans to become sexually active  Denies problems with urinating or BM's   Pt is aware intercourse and intercourse should not resume until after 6 weeks postpaum    LABS:  2023 WNL PAP, HPV other positive     Objective   EXAM:  GENERAL: alert, well appearing, and in no distress. VITALS: Blood pressure 136/84, pulse 72, height 5' 2" (1.575 m), weight 101 kg (222 lb), last menstrual period 2022, not currently breastfeeding. BMI: Body mass index is 40.6 kg/m². WNL respiratory effort, negative cough or SOB  BREASTS: Denies pain, redness or lumps  ABDOMEN: Soft, NT, LTCS incision is healed  EXTREMITIES: Denies pain, redness or edema    Assessment/Plan   Diagnoses and all orders for this visit:    Postpartum follow-up    S/P primary low transverse     General counseling and advice on female contraception    Encounter for initial prescription of transdermal patch hormonal contraceptive device  -     norelgestromin-ethinyl estradiol (ORTHO EVRA) 150-35 MCG/24HR; Place 1 patch on the skin over 7 days once a week        Plan  Patient Instructions   Annual GYN exam is due after 2024 and patch check  Start birth control patches at least 1 week prior to resuming intercourse  Call with needs or concerns    Return in about 4 months (around 2024) for Annual GYN exam, After 2024.   Pt verbalized understanding of all discussed.     TAPAN Raman

## 2023-09-12 NOTE — PATIENT INSTRUCTIONS
Annual GYN exam is due after 1/31/2024 and patch check  Start birth control patches at least 1 week prior to resuming intercourse  Call with needs or concerns

## 2023-12-18 ENCOUNTER — OFFICE VISIT (OUTPATIENT)
Dept: OBGYN CLINIC | Facility: CLINIC | Age: 33
End: 2023-12-18

## 2023-12-18 VITALS
BODY MASS INDEX: 40.93 KG/M2 | WEIGHT: 223.8 LBS | HEART RATE: 85 BPM | DIASTOLIC BLOOD PRESSURE: 78 MMHG | SYSTOLIC BLOOD PRESSURE: 114 MMHG

## 2023-12-18 DIAGNOSIS — Z32.01 POSITIVE PREGNANCY TEST: ICD-10-CM

## 2023-12-18 DIAGNOSIS — N92.6 MISSED MENSES: Primary | ICD-10-CM

## 2023-12-18 PROBLEM — R87.619 ABNORMAL PAP SMEAR OF CERVIX: Status: RESOLVED | Noted: 2023-03-01 | Resolved: 2023-12-18

## 2023-12-18 PROBLEM — Z98.891 S/P PRIMARY LOW TRANSVERSE C-SECTION: Status: RESOLVED | Noted: 2023-08-12 | Resolved: 2023-12-18

## 2023-12-18 PROBLEM — O42.919 PRETERM PREMATURE RUPTURE OF MEMBRANES (PPROM) WITH UNKNOWN ONSET OF LABOR: Status: RESOLVED | Noted: 2023-08-10 | Resolved: 2023-12-18

## 2023-12-18 LAB — SL AMB POCT URINE HCG: POSITIVE

## 2023-12-18 PROCEDURE — 99213 OFFICE O/P EST LOW 20 MIN: CPT | Performed by: NURSE PRACTITIONER

## 2023-12-18 PROCEDURE — 81025 URINE PREGNANCY TEST: CPT | Performed by: NURSE PRACTITIONER

## 2023-12-18 RX ORDER — VITAMIN A ACETATE, .BETA.-CAROTENE, ASCORBIC ACID, CHOLECALCIFEROL, .ALPHA.-TOCOPHEROL ACETATE, DL-, THIAMINE MONONITRATE, RIBOFLAVIN, NIACINAMIDE, PYRIDOXINE HYDROCHLORIDE, FOLIC ACID, CYANOCOBALAMIN, CALCIUM CARBONATE, FERROUS FUMARATE, ZINC OXIDE, CUPRIC OXIDE 9.9; 120; 920; 200; 400; 2; 12; 27; 1; 20; 10; 3; 1.84; 3080; 25 MG/1; MG/1; [IU]/1; MG/1; [IU]/1; MG/1; UG/1; MG/1; MG/1; MG/1; MG/1; MG/1; MG/1; [IU]/1; MG/1
1 TABLET, FILM COATED ORAL DAILY
Qty: 90 TABLET | Refills: 4 | Status: SHIPPED | OUTPATIENT
Start: 2023-12-18

## 2023-12-18 NOTE — PROGRESS NOTES
PROBLEM GYNECOLOGICAL VISIT    Adina Carvalho is a 33 y.o. female who presents today with complaint of + pregnancy test at home.  Her general medical history has been reviewed and she reports it as follows:    Past Medical History:   Diagnosis Date    Abnormal Pap smear of cervix     2023 NILM pap/+ other HRHPV    Asthma      Past Surgical History:   Procedure Laterality Date    GA  DELIVERY ONLY N/A 2023    Procedure:  SECTION ();  Surgeon: Rabia Falcon MD;  Location: [facility];  Service: Obstetrics     OB History          1    Para   1    Term   0       1    AB   0    Living   1         SAB   0    IAB   0    Ectopic   0    Multiple   0    Live Births   1               Social History     Tobacco Use    Smoking status: Never    Smokeless tobacco: Never   Vaping Use    Vaping status: Never Used   Substance Use Topics    Alcohol use: Not Currently     Comment: couple times/year, none since pregnant    Drug use: Never     Social History     Substance and Sexual Activity   Sexual Activity Yes    Partners: Male    Birth control/protection: None       Current Outpatient Medications   Medication Instructions    albuterol (ProAir HFA) 90 mcg/act inhaler 2 puffs, Inhalation, Every 6 hours PRN    fluticasone (Flovent HFA) 220 mcg/act inhaler 2 puffs, Inhalation, 2 times daily, Rinse mouth after use.    Prenatal Vit-Fe Fumarate-FA (Prenatal Vitamin) 27-0.8 MG TABS 1 tablet, Oral, Daily       History of Present Illness:   Patient recently had c/s delivery on 2023.  She reports that she had normal menses starting 10/22/2023 and no menses since then.  She also reports that she has been sexually active and not using contraceptive patch as prescribed.  States she had + pregnancy test at home last week.  Denies vaginal bleeding or pelvic/abdominal pain.  Reports nausea but no vomiting.    Review of Systems:  Review of Systems   Constitutional: Negative.    Gastrointestinal:   Positive for nausea. Negative for vomiting.   Genitourinary:  Positive for menstrual problem. Negative for vaginal bleeding.       Physical Exam:  /78   Pulse 85   Wt 102 kg (223 lb 12.8 oz)   LMP 10/22/2023 (Approximate)   BMI 40.93 kg/m²   Physical Exam  Constitutional:       General: She is not in acute distress.  Neurological:      Mental Status: She is alert.   Skin:     General: Skin is warm and dry.   Vitals reviewed.       Point of Care Testing:   -urine pregnancy test: positive    Assessment:   1. Positive pregnancy test.    Plan:   1. Rx prenatal vitamins sent to pharmacy.   2. Return to office in 3-4 days for viability/dating ultrasound.

## 2023-12-18 NOTE — PATIENT INSTRUCTIONS
Jessica por ricketts confianza en nuestro equipo.   Le agradecemos y agradecemos leoncio comentarios.   Si recibe radha encuesta nuestra, tómese unos momentos para informarnos cómo estamos.   Sinceramente,  TAPAN Suarez

## 2023-12-22 ENCOUNTER — ULTRASOUND (OUTPATIENT)
Dept: OBGYN CLINIC | Facility: CLINIC | Age: 33
End: 2023-12-22

## 2023-12-22 VITALS
DIASTOLIC BLOOD PRESSURE: 72 MMHG | BODY MASS INDEX: 40.57 KG/M2 | SYSTOLIC BLOOD PRESSURE: 132 MMHG | WEIGHT: 221.8 LBS | HEART RATE: 79 BPM

## 2023-12-22 DIAGNOSIS — Z32.01 POSITIVE PREGNANCY TEST: Primary | ICD-10-CM

## 2023-12-22 NOTE — PROGRESS NOTES
Adina Carvalho is a  who presents today for viability/dating ultrasound.  Patient's last menstrual period was 10/22/2023 (approximate).  She reports positive pregnancy test at home last week and here in the clinic on 2023.  She denies vaginal bleeding or abdominal/pelvic pain.    /72   Pulse 79   Wt 101 kg (221 lb 12.8 oz)   LMP 10/22/2023 (Approximate)   BMI 40.57 kg/m²   Abdomen soft and non-tender.    Transvaginal Pelvic Ultrasound:  # of fetuses: 1  Intrauterine: yes  Average CRL: 7.53cm (13w4d)  EDC based on CRL: 2024  Fetal cardiac activity: present  FHR: 159  Additional Findings: + gross fetal movement    The gestational age by LMP is 9w 0d - 13w 6d and demonstrates more than 7 days difference from the gestational age by CRL, therefore the final SHAHRIAR will be based on the ultrasound at this encounter.    Final SHAHRIAR: 2024 by ultrasound at this encounter.    Return in 1 week for OB intake.  Continue medications as below.    Current Outpatient Medications   Medication Instructions    albuterol (ProAir HFA) 90 mcg/act inhaler 2 puffs, Inhalation, Every 6 hours PRN    fluticasone (Flovent HFA) 220 mcg/act inhaler 2 puffs, Inhalation, 2 times daily, Rinse mouth after use.    Prenatal Vit-Fe Fumarate-FA (Prenatal Plus Vitamin/Mineral) 27-1 MG TABS 1 tablet, Oral, Daily       TAPAN Suarez  [facility] [address] [address]  Dept: [phone]  Dept Fax: [phone]  Loc Appt: [phone]  Loc: [phone]  Loc Fax: [phone]

## 2023-12-22 NOTE — PATIENT INSTRUCTIONS
Thank you for your confidence in our team.   We appreciate you and welcome your feedback.   If you receive a survey from us, please take a few moments to let us know how we are doing.   Sincerely,  TAPAN Suarez

## 2024-01-10 ENCOUNTER — INITIAL PRENATAL (OUTPATIENT)
Dept: OBGYN CLINIC | Facility: CLINIC | Age: 34
End: 2024-01-10

## 2024-01-10 ENCOUNTER — PATIENT OUTREACH (OUTPATIENT)
Dept: OBGYN CLINIC | Facility: CLINIC | Age: 34
End: 2024-01-10

## 2024-01-10 VITALS
WEIGHT: 227.2 LBS | HEART RATE: 87 BPM | BODY MASS INDEX: 41.81 KG/M2 | SYSTOLIC BLOOD PRESSURE: 114 MMHG | DIASTOLIC BLOOD PRESSURE: 75 MMHG | HEIGHT: 62 IN

## 2024-01-10 DIAGNOSIS — Z3A.16 16 WEEKS GESTATION OF PREGNANCY: ICD-10-CM

## 2024-01-10 DIAGNOSIS — Z34.91 PRENATAL CARE IN FIRST TRIMESTER: Primary | ICD-10-CM

## 2024-01-10 PROCEDURE — 99211 OFF/OP EST MAY X REQ PHY/QHP: CPT

## 2024-01-10 NOTE — PATIENT INSTRUCTIONS
SENALES TRISH EL EMBARAZO  Llame a nuestra oficina al [phone] para cualquiera de los siguientes:    1. sangrado vaginal  2. Dolor abdominal sharona que no desaparece.  3. Fiebre (más de 100.4 y no se patsy con Tylenol)  4. Vómitos persistentes que bermeo más de 24 horas.  5. dolor de pecho  6. Dolor o ardor al orinar.  7. Dolor de davin severo que no se resuelve con Tylenol  8. Visión borrosa o natasha puntos en ricketts visión.  9. Hinchazón repentina de ricketts jensen o reji.  10. Enrojecimiento, hinchazón o dolor en radha pierna.  11. Un aumento de peso repentino en pocos días.  12. Contar los movimientos fetales del bebé. (después de 28 semanas o el sexto mes de embarazo)  13. Radha pérdida de líquido acuoso de la vagina: puede ser un chorro, un goteo o radha humedad continua  14. Después de 20 semanas de embarazo, calambres rítmicos (más de 4 por hora) o menstruales olimpia dolor bajo / pélvico

## 2024-01-10 NOTE — PROGRESS NOTES
"OBSTETRIC INTAKE VISIT    Adina Carvalho presents today for initial OB visit and intake at 16w2d. LMP - 10/22/23.  History obtained from patient and she reports it as follows:    Past Medical History:   Diagnosis Date    Abnormal Pap smear of cervix     2023 NILM pap/+ other HRHPV    Asthma      Past Surgical History:   Procedure Laterality Date    ID  DELIVERY ONLY N/A 2023    Procedure:  SECTION ();  Surgeon: Rabia Falcon MD;  Location: [facility];  Service: Obstetrics     OB History    Para Term  AB Living   2 1 0 1 0 1   SAB IAB Ectopic Multiple Live Births   0 0 0 0 1      # Outcome Date GA Lbr Van/2nd Weight Sex Delivery Anes PTL Lv   2 Current            1  23 36w3d  2070 g (4 lb 9 oz) M CS-LTranv EPI Y PEPPER      Complications: Fetal Intolerance     Social History     Tobacco Use    Smoking status: Never    Smokeless tobacco: Never   Vaping Use    Vaping status: Never Used   Substance Use Topics    Alcohol use: Not Currently     Comment: couple times/year, none since pregnant    Drug use: Never       Current Outpatient Medications   Medication Instructions    albuterol (ProAir HFA) 90 mcg/act inhaler 2 puffs, Inhalation, Every 6 hours PRN    fluticasone (Flovent HFA) 220 mcg/act inhaler 2 puffs, Inhalation, 2 times daily, Rinse mouth after use.    Prenatal Vit-Fe Fumarate-FA (Prenatal Plus Vitamin/Mineral) 27-1 MG TABS 1 tablet, Oral, Daily       Allergies   Allergen Reactions    Penicillins Anaphylaxis       Vitals: /75 (BP Location: Left arm, Patient Position: Sitting, Cuff Size: Standard)   Pulse 87   Ht 5' 2\" (1.575 m)   Wt 103 kg (227 lb 3.2 oz)   LMP 10/22/2023 (Approximate)   BMI 41.56 kg/m²     Review of Systems:  Denies vaginal bleeding or leaking fluid.  Denies abdominal/pelvic pain or contractions.    Plan:  1. OB labwork ordered today to include Prenatal Panel, HCV antibody, Hgb fractionation cascade.  Other labs include " Glucose 1H PG.  Advised patient to have drawn within the next few days.  2. Will help pt schedule ultrasound with MFM.  3. Return 1/27/24 for H&P prenatal visit.  4. Referrals placed for WIC, , and [facility].  5. Given vaccines: Flu vaccine declined.  6. Patient's depression screening was assessed with a PHQ-2 score of 0. Their PHQ-9 score was 0. Clinically patient does not have depression. No treatment is required.   in to see patient.  7. Reviewed the following educational topics with patient:   -routine prenatal visit/ultrasound/labwork schedule   -hospital for delivery and office phone/answering service contact information   -nutritional demands of pregnancy, healthy dietary habits   -listeria, toxoplasmosis, seafood precautions   -weight gain expectations (based on pre-pregnant BMI)   -exercise, rest, and sexual activity during pregnancy   -abstinence from alcohol, tobacco, and illegal drugs   -common discomforts of pregnancy and appropriate management   -OTC medications safe to use in pregnancy   -genetic screening options   -vaccines in pregnancy   -symptoms to report to OB provider    -signs of PTL and pre-eclampsia    -vaginal bleeding/leaking of fluid    -severe n/v-unable to tolerate ANY food/fluids for more than 24 hours

## 2024-01-10 NOTE — PROGRESS NOTES
ESMER THURSTON was referred by TAPAN Alcocer to complete a PN SW assessment in the second trimester. Pt is now , she is 67u3rON, her SHAHRIAR is 2024. The patient is Albanian speaking and ESMER THURSTON utilized the [employer] interpretor services to complete the assessment. She is here alone today for her nurse intake.     Pt reports this pregnancy is unplanned but welcome. She is currently 5 months post partum with her first child. She reports she lives with her mother and the FOB. She reports she and FOB are currently looking to get their own apartment. Pt reports she felt shocked at first and was nervous about the pregnancy being viable because she had a short interval between pregnancies but felt re-assured with her ultrasound. She did feel like this happened sooner than she wished but she is embracing the pregnancy. Her  is very happy to have another baby as he wants five children. 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 Pt has WIC and will add herself back on today now that she has the papers from us. She has applied for MA, ESMER THURSTON encouraged her to also apply for SNAP. She either sometimes drives herself using her moms care or takes an uber to appointments. She voices no concerns with obtaining baby supplies.     Pt denies any CYS, legal, IPV, MH or D&A history or concerns.     ESMER THURSTON encouraged the pt to outreach as needed but will otherwise close this referral at this time as no needs are identified.

## 2024-01-10 NOTE — LETTER
1/10/2024      This letter is to confirm that Adina Carvalho is pregnant and is under our care.  Her Estimated Date of Delivery: 6/24/24.    If you have any questions or concerns, please contact our office.  Thank you,    TAPAN Jacobo

## 2024-01-10 NOTE — LETTER
"    [facility] REFERRAL      Date: 1/10/2024    Patient name: Adina Carvalho    YOB: 1990    Estimated Date of Delivery: 6/24/24      /75 (BP Location: Left arm, Patient Position: Sitting, Cuff Size: Standard)   Pulse 87   Ht 5' 2\" (1.575 m)   Wt 103 kg (227 lb 3.2 oz)   LMP 10/22/2023 (Approximate)   BMI 41.56 kg/m²         Thank you,  TAPAN Jacobo            [facility] locations:   1. [facility]       [address] [place] [address]       Phone: [phone]       Fax: [phone]     2. [facility]       [address] [address]       Phone: [phone]       Fax: [phone]       "

## 2024-01-27 ENCOUNTER — INITIAL PRENATAL (OUTPATIENT)
Dept: OBGYN CLINIC | Facility: CLINIC | Age: 34
End: 2024-01-27

## 2024-01-27 VITALS
BODY MASS INDEX: 42.44 KG/M2 | HEIGHT: 62 IN | WEIGHT: 230.6 LBS | DIASTOLIC BLOOD PRESSURE: 75 MMHG | HEART RATE: 85 BPM | SYSTOLIC BLOOD PRESSURE: 113 MMHG

## 2024-01-27 DIAGNOSIS — Z34.92 PRENATAL CARE IN SECOND TRIMESTER: Primary | ICD-10-CM

## 2024-01-27 DIAGNOSIS — Z3A.18 18 WEEKS GESTATION OF PREGNANCY: ICD-10-CM

## 2024-01-27 PROBLEM — O99.210 OBESITY AFFECTING PREGNANCY: Status: ACTIVE | Noted: 2024-01-27

## 2024-01-27 PROBLEM — O99.519 ASTHMA DURING PREGNANCY: Status: ACTIVE | Noted: 2024-01-27

## 2024-01-27 PROBLEM — J45.909 ASTHMA DURING PREGNANCY: Status: ACTIVE | Noted: 2024-01-27

## 2024-01-27 PROBLEM — O09.899 SHORT INTERVAL BETWEEN PREGNANCIES AFFECTING PREGNANCY, ANTEPARTUM: Status: ACTIVE | Noted: 2024-01-27

## 2024-01-27 PROBLEM — O34.219 HISTORY OF CESAREAN DELIVERY, ANTEPARTUM: Status: ACTIVE | Noted: 2024-01-27

## 2024-01-27 PROBLEM — O09.299 PRIOR PREGNANCY COMPLICATED BY IUGR, ANTEPARTUM: Status: ACTIVE | Noted: 2024-01-27

## 2024-01-27 PROBLEM — O09.899 HISTORY OF PRETERM DELIVERY, CURRENTLY PREGNANT: Status: ACTIVE | Noted: 2024-01-27

## 2024-01-27 PROCEDURE — 87591 N.GONORRHOEAE DNA AMP PROB: CPT | Performed by: NURSE PRACTITIONER

## 2024-01-27 PROCEDURE — 87491 CHLMYD TRACH DNA AMP PROBE: CPT | Performed by: NURSE PRACTITIONER

## 2024-01-27 PROCEDURE — 99214 OFFICE O/P EST MOD 30 MIN: CPT | Performed by: NURSE PRACTITIONER

## 2024-01-27 PROCEDURE — G0124 SCREEN C/V THIN LAYER BY MD: HCPCS | Performed by: PATHOLOGY

## 2024-01-27 PROCEDURE — 87624 HPV HI-RISK TYP POOLED RSLT: CPT | Performed by: NURSE PRACTITIONER

## 2024-01-27 PROCEDURE — G0145 SCR C/V CYTO,THINLAYER,RESCR: HCPCS | Performed by: PATHOLOGY

## 2024-01-27 NOTE — PROGRESS NOTES
Adina Carvalho presents today for OB H&P visit at 18w5d.  Blood Pressure: 113/75  Xr=204 kg (230 lb 9.6 oz); Body mass index is 42.18 kg/m².; TWG=4.808 kg (10 lb 9.6 oz)  Fetal Heart Rate: 156  Abdomen: gravid, soft, non-tender.  She reports asthma well-controlled without need for Albuterol.  Denies uterine contractions or persistent cramping.  Denies vaginal bleeding or leaking of fluid.  No fetal movement yet.  Pap/GC/CT collected.  Scheduled for ultrasound 2/5/2024.  Reviewed common discomforts of pregnancy in second trimester and warning signs.  Advised to continue medications and return in 4 weeks.      Current Outpatient Medications   Medication Instructions    albuterol (ProAir HFA) 90 mcg/act inhaler 2 puffs, Inhalation, Every 6 hours PRN    fluticasone (Flovent HFA) 220 mcg/act inhaler 2 puffs, Inhalation, 2 times daily, Rinse mouth after use.    Prenatal Vit-Fe Fumarate-FA (Prenatal Plus Vitamin/Mineral) 27-1 MG TABS 1 tablet, Oral, Daily       Laboratory workup: initial OB labs (ordered 1/10/24)    Genetic Screening: scheduled w/MFM 2/5/24    Vaccinations: influenza (declined 1/10/24); Tdap (will offer after 27 weeks); RSV (will offer b/w [id number] during RSV season); COVID-19 (declined 1/27/24)    Postpartum contraception: undecided    Fetal Ultrasounds:  12/22/23 (13w4d) EDC confirmed      G2 Problems (from 12/22/23 to present)       Problem Noted Resolved    Asthma during pregnancy 1/27/2024 by TAPAN Suarez No    Overview Signed 1/27/2024 11:29 AM by TAPAN Suarez     Albuterol prn  Flovent BID since pre-gestation         Short interval pregnancy 1/27/2024 by TAPAN Suarez No    Overview Signed 1/27/2024 11:30 AM by TAPAN Suarez     Serial growth scans         Hx of PTD @36 weeks 1/27/2024 by TAPAN Suarez No    Overview Addendum 1/27/2024 11:35 AM by TAPAN Suarez     PPROM @36w2d  Needs MFM consultation  Serial CL  measurements         History of c/s x1 2024 by TAPAN Suarez No    Overview Addendum 2024 11:37 AM by TAPAN Suarez     2023 Op note documents LTV uterine incision  Desires repeat c/s         Prior IUGR 2024 by TAAPN Suarez No    Overview Signed 2024 11:36 AM by TAPAN Suarez     Serial growth scans         Obesity affecting pregnancy 2024 by TAPAN Suarez No    Overview Signed 2024 11:36 AM by TAPAN Suarez     Pre-pregnant BMI=40.23  Needs early GDM screen  Serial growth scans  Needs AFS @34 weeks            Past Medical History:   Diagnosis Date    Abnormal Pap smear of cervix     2023 NILM pap/+ other HRHPV    Asthma      Past Surgical History:   Procedure Laterality Date    ID  DELIVERY ONLY N/A 2023    Procedure:  SECTION ();  Surgeon: Rabia Falcon MD;  Location: [facility];  Service: Obstetrics     OB History          2    Para   1    Term   0       1    AB   0    Living   1         SAB   0    IAB   0    Ectopic   0    Multiple   0    Live Births   1               Social History     Tobacco Use    Smoking status: Never    Smokeless tobacco: Never   Vaping Use    Vaping status: Never Used   Substance Use Topics    Alcohol use: Not Currently     Comment: couple times/year, none since pregnant    Drug use: Never

## 2024-01-27 NOTE — PATIENT INSTRUCTIONS
Jessica por ricketts confianza en nuestro equipo.   Le agradecemos y agradecemos leoncio comentarios.   Si recibe romy encuesta nuestra, tómese unos momentos para informarnos cómo estamos.   Sinceramente,  TAPAN Suarez       SENALES TRISH EL EMBARAZO  Llame a nuestra oficina al [phone] para cualquiera de los siguientes:    1. sangrado vaginal  2. Dolor abdominal sharona que no desaparece.  3. Fiebre (más de 100.4 y no se patsy con Tylenol)  4. Vómitos persistentes que bermeo más de 24 horas.  5. dolor de pecho  6. Dolor o ardor al orinar.  7. Dolor de davin severo que no se resuelve con Tylenol  8. Visión borrosa o natasha puntos en ricketts visión.  9. Hinchazón repentina de ricketts jensen o reji.  10. Enrojecimiento, hinchazón o dolor en romy pierna.  11. Un aumento de peso repentino en pocos días.  12. Contar los movimientos fetales del bebé. (después de 28 semanas o el sexto mes de embarazo)  13. Romy pérdida de líquido acuoso de la vagina: puede ser un chorro, un goteo o romy humedad continua  14. Después de 20 semanas de embarazo, calambres rítmicos (más de 4 por hora) o menstruales olimpia dolor bajo / pélvico

## 2024-01-30 LAB
C TRACH DNA SPEC QL NAA+PROBE: NEGATIVE
N GONORRHOEA DNA SPEC QL NAA+PROBE: NEGATIVE

## 2024-02-05 ENCOUNTER — TELEPHONE (OUTPATIENT)
Dept: PERINATAL CARE | Facility: CLINIC | Age: 34
End: 2024-02-05

## 2024-02-05 NOTE — TELEPHONE ENCOUNTER
Called [employer] spoke with Corby @ 9:24 # [phone] to set up [employer] transportation for patient's Maternal Fetal Medicine appointment.  Appointment scheduled on  February 6, 2024 at 12:45 PM at [facility].       Spoke with patient and explained [employer] will pick-up patient at 12:00 PM for Maternal Fetal Medicine appointment.   Patient instructed she has 5 minutes maximum to get into car upon arrival. Patient verbalized understanding.     Confirmed phone and address.   [phone]  [address] [place] [id number]

## 2024-02-06 ENCOUNTER — APPOINTMENT (OUTPATIENT)
Dept: PERINATAL CARE | Facility: CLINIC | Age: 34
End: 2024-02-06

## 2024-02-06 ENCOUNTER — TELEPHONE (OUTPATIENT)
Dept: PERINATAL CARE | Facility: CLINIC | Age: 34
End: 2024-02-06

## 2024-02-06 DIAGNOSIS — Z3A.20 20 WEEKS GESTATION OF PREGNANCY: ICD-10-CM

## 2024-02-06 DIAGNOSIS — O09.899 HISTORY OF PRETERM DELIVERY, CURRENTLY PREGNANT: ICD-10-CM

## 2024-02-06 DIAGNOSIS — O99.210 OBESITY AFFECTING PREGNANCY, ANTEPARTUM, UNSPECIFIED OBESITY TYPE: ICD-10-CM

## 2024-02-06 DIAGNOSIS — Z03.75 ENCOUNTER FOR SUSPECTED CERVICAL SHORTENING RULED OUT: ICD-10-CM

## 2024-02-06 DIAGNOSIS — Z36.3 ENCOUNTER FOR ANTENATAL SCREENING FOR MALFORMATION: Primary | ICD-10-CM

## 2024-02-06 DIAGNOSIS — O09.299 PRIOR PREGNANCY COMPLICATED BY IUGR, ANTEPARTUM: ICD-10-CM

## 2024-02-06 LAB
LAB AP GYN PRIMARY INTERPRETATION: ABNORMAL
LAB AP LMP: ABNORMAL
Lab: ABNORMAL
PATH INTERP SPEC-IMP: ABNORMAL

## 2024-02-06 NOTE — TELEPHONE ENCOUNTER
Left voicemail in Portuguese for patient to call [phone] to reschedule her missed appointment.     Spoke with Stephane at [facility] and confirmed address and phone number on file with [facility].   [phone]  [address] [place] [phone]    Today's 12 pm Lyft ride was cancelled by [place], the . Patient was a no show for 12 pm pick-up, waited 6 minutes. Per Stephane, patient should receive a text when [facility] sets up Lyft and when a  accepts the ride.

## 2024-02-06 NOTE — TELEPHONE ENCOUNTER
I spoke with the pt today .I'm offer to her in [facility] on 02/19 @ 10:15 and offer her LYFT as well and she denied . She only want's in [facility]

## 2024-02-07 ENCOUNTER — TELEPHONE (OUTPATIENT)
Dept: OBGYN CLINIC | Facility: CLINIC | Age: 34
End: 2024-02-07

## 2024-02-07 DIAGNOSIS — Z11.51 SCREENING FOR HPV (HUMAN PAPILLOMAVIRUS): Primary | ICD-10-CM

## 2024-02-07 NOTE — TELEPHONE ENCOUNTER
Called [phone], no answer and voicemail box has not been set up.     Left voicemail ([phone]) asking patient to call my personal teams number [phone] to help assist rescheduling her missed appointment.

## 2024-02-07 NOTE — TELEPHONE ENCOUNTER
Patient called back in regards to her ultrasound. Patient was very adamant she will not be using Lyft services and will only go to [place] location because she can walk to [place]. Reviewed [place]'s schedule. Explained we offer care at 6 locations and offered appointments at different sites. Patient declined those appointments but did schedule her detailed for 3/5/2024 at 8 am in our [place] location.

## 2024-02-09 LAB
HPV HR 12 DNA CVX QL NAA+PROBE: POSITIVE
HPV16 DNA CVX QL NAA+PROBE: NEGATIVE
HPV18 DNA CVX QL NAA+PROBE: NEGATIVE

## 2024-02-10 ENCOUNTER — TELEPHONE (OUTPATIENT)
Dept: OBGYN CLINIC | Facility: CLINIC | Age: 34
End: 2024-02-10

## 2024-02-10 PROBLEM — R87.619 ABNORMAL PAP SMEAR OF CERVIX: Status: ACTIVE | Noted: 2024-02-10

## 2024-02-10 NOTE — TELEPHONE ENCOUNTER
Attempted to contact patient x2 to review abnormal pap smear results but no answer to phone ringing and no voicemail available to leave message.

## 2024-02-19 PROBLEM — Z3A.22 22 WEEKS GESTATION OF PREGNANCY: Status: ACTIVE | Noted: 2024-01-27

## 2024-02-19 NOTE — ASSESSMENT & PLAN NOTE
Discussed pap smear/HPV results with patient and that it is recommended to undergo colposcopy  Will schedule today

## 2024-02-19 NOTE — PROGRESS NOTES
OB/GYN Prenatal Visit    ASSESSMENT / PLAN:  1. 22 weeks gestation of pregnancy  Assessment & Plan:  Patient doing well with no OB complaints  Encouraged patient to obtain prenatal labs  Patient will go to scheduled ultrasound on 3/5/24  Follow-up in clinic in 4 weeks      2. Prenatal care in second trimester    3. 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  at 22w2d here for prenatal visit.  She has no obstetric complaints and denies pelvic pain, cramping/contractions, vaginal bleeding, loss of fluid, and she is starting to feel baby move. Patient states she tried to get her prenatal labs collected but the lab across the street did not see anything ordered for her.     OBJECTIVE:  Vitals:    24 1311   BP: 121/61   Pulse: 82       FHT: 150 bpm  Fundal height: 22 cm    Physical Exam:    General: Well appearing, no distress  Respiratory: Unlabored breathing  Cardiovascular: Regular rate.  Abdomen: Soft, gravid, nontender  Fundal Height: Appropriate for gestational age.  Extremities: Warm and well perfused.  Non tender.      Lory Avalos MD  2024  1:32 PM

## 2024-02-20 NOTE — ASSESSMENT & PLAN NOTE
Patient doing well with no OB complaints  Encouraged patient to obtain prenatal labs  Patient will go to scheduled ultrasound on 3/5/24  Follow-up in clinic in 4 weeks

## 2024-02-21 ENCOUNTER — ROUTINE PRENATAL (OUTPATIENT)
Dept: OBGYN CLINIC | Facility: CLINIC | Age: 34
End: 2024-02-21

## 2024-02-21 VITALS
SYSTOLIC BLOOD PRESSURE: 121 MMHG | HEART RATE: 82 BPM | DIASTOLIC BLOOD PRESSURE: 61 MMHG | HEIGHT: 62 IN | BODY MASS INDEX: 42.69 KG/M2 | WEIGHT: 232 LBS

## 2024-02-21 DIAGNOSIS — Z3A.22 22 WEEKS GESTATION OF PREGNANCY: Primary | ICD-10-CM

## 2024-02-21 DIAGNOSIS — Z34.92 PRENATAL CARE IN SECOND TRIMESTER: ICD-10-CM

## 2024-02-21 DIAGNOSIS — R87.612 LOW GRADE SQUAMOUS INTRAEPITHELIAL LESION ON CYTOLOGIC SMEAR OF CERVIX (LGSIL): ICD-10-CM

## 2024-02-21 PROCEDURE — 99213 OFFICE O/P EST LOW 20 MIN: CPT | Performed by: OBSTETRICS & GYNECOLOGY

## 2024-03-04 PROBLEM — Z3A.24 24 WEEKS GESTATION OF PREGNANCY: Status: ACTIVE | Noted: 2024-01-27

## 2024-03-04 NOTE — PROGRESS NOTES
Colposcopy     Date/Time  3/6/2024 4:30 PM     [place] Protocol   Procedure performed by: (Dr. Laureano)  Consent: Verbal consent obtained.  Risks and benefits: risks, benefits and alternatives were discussed  Consent given by: patient  Patient understanding: patient states understanding of the procedure being performed  Procedure consent: procedure consent matches procedure scheduled  Required items: required blood products, implants, devices, and special equipment available  Patient identity confirmed: verbally with patient     Performed by  Lory Avalos MD   Authorized by  Lory Avalos MD     Pre-procedure details      Prepped with: acetic acid     Indication    LSIL   Procedure Details   Procedure: Colposcopy only      Under satisfactory analgesia the patient was prepped and draped in the dorsal lithotomy position: yes      [place] speculum was placed in the vagina: yes      Under colposcopic examination the transition zone was seen in entirety: yes      Intracervical block was performed: no      Tampon inserted: no      Monsel's solution was applied: no      Biopsy(s): no      Specimen to pathology: no     Post-procedure      Findings: White epithelium      Impression: Low grade cervical dysplasia      Patient tolerance of procedure:  Tolerated well, no immediate complications   Comments       Colposcopy Procedure Note    Indications: Pap smear 1.5 months ago showed: LGSIL.  The prior pap showed 1 year ago showed no abnormalities but she was HPV other positive    Procedure Details   The risks (incl bleeding, infection and pain) and benefits (determination of extent of disease if any) of the procedure were reviewed and Verbal informed consent was obtained.      The cervix was isolated easily; 5% acetic acid was liberally applied; the cervix was inspected with the findings noted below. The SCJ was seen in its entirety.   No biopsy was felt necessary.  An endocervical curettage was not done as the patient is  pregnant and no suspicious lesions visualized.  The patient tolerated the procedure well.    Findings:  Cervical findings: no mosaicism, no punctation, no abnormal vasculature. HPV changes noted circumferentially with mild, homogenous acetowhite appearance.  Vaginal findings: normal without visible lesions  Vulvar findings: normal mucosa without lesions     Complications: none.    Plan:  Return to office for routine prenatal care in 4 weeks  Recommend HPV and pap smear cotesting in 1 year           Patient denies cramping or contractions, leakage of fluid, vaginal bleeding, and decreased fetal movement.  bpm.     Lory Avalos MD  OBGYN, PGY-1  03/06/24  5:18 PM

## 2024-03-05 ENCOUNTER — ROUTINE PRENATAL (OUTPATIENT)
Age: 34
End: 2024-03-05
Payer: COMMERCIAL

## 2024-03-05 ENCOUNTER — APPOINTMENT (OUTPATIENT)
Dept: LAB | Facility: HOSPITAL | Age: 34
End: 2024-03-05
Payer: COMMERCIAL

## 2024-03-05 VITALS
HEIGHT: 62 IN | BODY MASS INDEX: 43.79 KG/M2 | SYSTOLIC BLOOD PRESSURE: 126 MMHG | DIASTOLIC BLOOD PRESSURE: 68 MMHG | WEIGHT: 238 LBS | HEART RATE: 89 BPM

## 2024-03-05 DIAGNOSIS — O09.899 SHORT INTERVAL BETWEEN PREGNANCIES AFFECTING PREGNANCY, ANTEPARTUM: ICD-10-CM

## 2024-03-05 DIAGNOSIS — Z3A.24 24 WEEKS GESTATION OF PREGNANCY: ICD-10-CM

## 2024-03-05 DIAGNOSIS — O09.899 HISTORY OF PRETERM DELIVERY, CURRENTLY PREGNANT: ICD-10-CM

## 2024-03-05 DIAGNOSIS — Z36.0 SCREENING FOR CHROMOSOMAL ANOMALIES BY AMNIOCENTESIS: ICD-10-CM

## 2024-03-05 DIAGNOSIS — O99.210 OBESITY AFFECTING PREGNANCY, ANTEPARTUM, UNSPECIFIED OBESITY TYPE: ICD-10-CM

## 2024-03-05 DIAGNOSIS — Z3A.16 16 WEEKS GESTATION OF PREGNANCY: ICD-10-CM

## 2024-03-05 DIAGNOSIS — Z36.3 ENCOUNTER FOR ANTENATAL SCREENING FOR MALFORMATION: Primary | ICD-10-CM

## 2024-03-05 DIAGNOSIS — O09.299 PRIOR PREGNANCY COMPLICATED BY IUGR, ANTEPARTUM: ICD-10-CM

## 2024-03-05 DIAGNOSIS — Z34.91 PRENATAL CARE IN FIRST TRIMESTER: ICD-10-CM

## 2024-03-05 DIAGNOSIS — Z33.1 PREGNANT STATE, INCIDENTAL: ICD-10-CM

## 2024-03-05 DIAGNOSIS — O34.219 HISTORY OF CESAREAN DELIVERY, ANTEPARTUM: ICD-10-CM

## 2024-03-05 LAB
ABO GROUP BLD: NORMAL
BASOPHILS # BLD AUTO: 0.01 THOUSANDS/ÂΜL (ref 0–0.1)
BASOPHILS NFR BLD AUTO: 0 % (ref 0–1)
BLD GP AB SCN SERPL QL: NEGATIVE
EOSINOPHIL # BLD AUTO: 0.07 THOUSAND/ÂΜL (ref 0–0.61)
EOSINOPHIL NFR BLD AUTO: 2 % (ref 0–6)
ERYTHROCYTE [DISTWIDTH] IN BLOOD BY AUTOMATED COUNT: 12.6 % (ref 11.6–15.1)
GLUCOSE 1H P 50 G GLC PO SERPL-MCNC: 102 MG/DL (ref 50–134)
HBV SURFACE AB SER-ACNC: 27.8 MIU/ML
HBV SURFACE AG SER QL: NORMAL
HCT VFR BLD AUTO: 30.8 % (ref 34.8–46.1)
HCV AB SER QL: NORMAL
HGB BLD-MCNC: 10.8 G/DL (ref 11.5–15.4)
HIV 1+2 AB+HIV1 P24 AG SERPL QL IA: NORMAL
HIV 2 AB SERPL QL IA: NORMAL
HIV1 AB SERPL QL IA: NORMAL
HIV1 P24 AG SERPL QL IA: NORMAL
IMM GRANULOCYTES # BLD AUTO: 0.03 THOUSAND/UL (ref 0–0.2)
IMM GRANULOCYTES NFR BLD AUTO: 1 % (ref 0–2)
LYMPHOCYTES # BLD AUTO: 0.84 THOUSANDS/ÂΜL (ref 0.6–4.47)
LYMPHOCYTES NFR BLD AUTO: 19 % (ref 14–44)
MCH RBC QN AUTO: 33.8 PG (ref 26.8–34.3)
MCHC RBC AUTO-ENTMCNC: 35.1 G/DL (ref 31.4–37.4)
MCV RBC AUTO: 96 FL (ref 82–98)
MONOCYTES # BLD AUTO: 0.26 THOUSAND/ÂΜL (ref 0.17–1.22)
MONOCYTES NFR BLD AUTO: 6 % (ref 4–12)
NEUTROPHILS # BLD AUTO: 3.17 THOUSANDS/ÂΜL (ref 1.85–7.62)
NEUTS SEG NFR BLD AUTO: 72 % (ref 43–75)
NRBC BLD AUTO-RTO: 0 /100 WBCS
PLATELET # BLD AUTO: 206 THOUSANDS/UL (ref 149–390)
PMV BLD AUTO: 10.3 FL (ref 8.9–12.7)
RBC # BLD AUTO: 3.2 MILLION/UL (ref 3.81–5.12)
RH BLD: POSITIVE
RUBV IGG SERPL IA-ACNC: 46.5 IU/ML
SPECIMEN EXPIRATION DATE: NORMAL
TREPONEMA PALLIDUM IGG+IGM AB [PRESENCE] IN SERUM OR PLASMA BY IMMUNOASSAY: NORMAL
WBC # BLD AUTO: 4.38 THOUSAND/UL (ref 4.31–10.16)

## 2024-03-05 PROCEDURE — 76811 OB US DETAILED SNGL FETUS: CPT | Performed by: STUDENT IN AN ORGANIZED HEALTH CARE EDUCATION/TRAINING PROGRAM

## 2024-03-05 PROCEDURE — 87389 HIV-1 AG W/HIV-1&-2 AB AG IA: CPT

## 2024-03-05 PROCEDURE — 85025 COMPLETE CBC W/AUTO DIFF WBC: CPT

## 2024-03-05 PROCEDURE — 86900 BLOOD TYPING SEROLOGIC ABO: CPT

## 2024-03-05 PROCEDURE — 83020 HEMOGLOBIN ELECTROPHORESIS: CPT

## 2024-03-05 PROCEDURE — 82950 GLUCOSE TEST: CPT

## 2024-03-05 PROCEDURE — 99243 OFF/OP CNSLTJ NEW/EST LOW 30: CPT | Performed by: STUDENT IN AN ORGANIZED HEALTH CARE EDUCATION/TRAINING PROGRAM

## 2024-03-05 PROCEDURE — 86803 HEPATITIS C AB TEST: CPT

## 2024-03-05 PROCEDURE — 86780 TREPONEMA PALLIDUM: CPT

## 2024-03-05 PROCEDURE — 87340 HEPATITIS B SURFACE AG IA: CPT

## 2024-03-05 PROCEDURE — 86901 BLOOD TYPING SEROLOGIC RH(D): CPT

## 2024-03-05 PROCEDURE — 86850 RBC ANTIBODY SCREEN: CPT

## 2024-03-05 PROCEDURE — 86762 RUBELLA ANTIBODY: CPT

## 2024-03-05 PROCEDURE — 86706 HEP B SURFACE ANTIBODY: CPT

## 2024-03-05 PROCEDURE — 36415 COLL VENOUS BLD VENIPUNCTURE: CPT

## 2024-03-05 RX ORDER — ASPIRIN 81 MG/1
162 TABLET ORAL DAILY
Qty: 180 TABLET | Refills: 1 | Status: SHIPPED | OUTPATIENT
Start: 2024-03-05 | End: 2024-07-03

## 2024-03-05 NOTE — PROGRESS NOTES
"[facility]: Ms. Jerome Carvalho was seen today for anatomic survey ultrasound.  See ultrasound report under \"OB Procedures\" tab.        Physical Exam  Constitutional:       General: She is not in acute distress.     Appearance: Normal appearance.   HENT:      Head: Normocephalic and atraumatic.   Eyes:      Extraocular Movements: Extraocular movements intact.   Cardiovascular:      Rate and Rhythm: Normal rate.   Pulmonary:      Effort: Pulmonary effort is normal. No respiratory distress.   Skin:     Findings: No erythema or rash.   Neurological:      Mental Status: She is alert and oriented to person, place, and time.   Psychiatric:         Mood and Affect: Mood normal.         Behavior: Behavior normal.         Please don't hesitate to contact our office with any concerns or questions.  -Maria Del Rosario Renteria MD      "

## 2024-03-05 NOTE — PROGRESS NOTES
Patient chose to have [facility] [id number] Non-Invasive Prenatal Screen [id number] PLUS w/ SCA, WITH fetal sex.  Patient choose billed through insurance.     Patient given brochure and is aware [facility] will contact patient's insurance and coordinate coverage.  Provided [facility] contact information. General inquiries [phone], Cost estimates [phone] and [facility] Billing [phone]. Website [employer].     Blood collection tubes labeled with patient identifiers (name, medical record number, and date of birth).     Filled out [facility] order form. Patient chose to be given a test kit. Patient instructed to take to a [facility] lab for blood collection..   If patient had blood work in office: Blood drawn from sent to lab for blood work. . Needle used sent to lab for blood work. .   If patient chose to have blood work drawn at a [facility] lab we requested patient notify MFM (via phone call or [employer] message) when blood collected so office can follow up on results.     Copy of lab order scanned to Epic media.     Maternal Fetal Medicine will have results in approximately 5-7 business days and will call patient or notify via [employer].  Patient aware viewing lab result online will reveal fetal sex if ordered.    Patient verbalized understanding of all instructions and no questions at this time.

## 2024-03-05 NOTE — LETTER
"2024     TAPAN Suarez  [address] [address]    Patient: Adina Carvalho   YOB: 1990   Date of Visit: 3/5/2024       Dear Dr. Norman:    Thank you for referring Adina Carvalho to me for evaluation. Below are my notes for this consultation.    If you have questions, please do not hesitate to call me. I look forward to following your patient along with you.         Sincerely,        Maria Del Rosario Renteria MD        CC: No Recipients    Maria Del Rosario Renteria MD  3/5/2024 10:07 AM  Sign when Signing Visit  [facility]: Ms. Jerome Carvalho was seen today for anatomic survey ultrasound.  See ultrasound report under \"OB Procedures\" tab.        Physical Exam  Constitutional:       General: She is not in acute distress.     Appearance: Normal appearance.   HENT:      Head: Normocephalic and atraumatic.   Eyes:      Extraocular Movements: Extraocular movements intact.   Cardiovascular:      Rate and Rhythm: Normal rate.   Pulmonary:      Effort: Pulmonary effort is normal. No respiratory distress.   Skin:     Findings: No erythema or rash.   Neurological:      Mental Status: She is alert and oriented to person, place, and time.   Psychiatric:         Mood and Affect: Mood normal.         Behavior: Behavior normal.         Please don't hesitate to contact our office with any concerns or questions.  -Maria Del Rosario Renteria MD         "

## 2024-03-06 ENCOUNTER — PROCEDURE VISIT (OUTPATIENT)
Dept: OBGYN CLINIC | Facility: CLINIC | Age: 34
End: 2024-03-06

## 2024-03-06 VITALS
BODY MASS INDEX: 44.13 KG/M2 | DIASTOLIC BLOOD PRESSURE: 72 MMHG | SYSTOLIC BLOOD PRESSURE: 112 MMHG | WEIGHT: 239.8 LBS | HEIGHT: 62 IN | HEART RATE: 80 BPM

## 2024-03-06 DIAGNOSIS — R87.612 LOW GRADE SQUAMOUS INTRAEPITHELIAL LESION ON CYTOLOGIC SMEAR OF CERVIX (LGSIL): Primary | ICD-10-CM

## 2024-03-06 DIAGNOSIS — Z3A.24 24 WEEKS GESTATION OF PREGNANCY: ICD-10-CM

## 2024-03-06 LAB — SL AMB POCT URINE HCG: POSITIVE

## 2024-03-06 PROCEDURE — 57452 EXAM OF CERVIX W/SCOPE: CPT | Performed by: OBSTETRICS & GYNECOLOGY

## 2024-03-06 PROCEDURE — 81025 URINE PREGNANCY TEST: CPT | Performed by: OBSTETRICS & GYNECOLOGY

## 2024-03-08 LAB
HGB A MFR BLD: 2.5 % (ref 1.8–3.2)
HGB A MFR BLD: 97.5 % (ref 96.4–98.8)
HGB F MFR BLD: 0 % (ref 0–2)
HGB FRACT BLD-IMP: NORMAL
HGB S MFR BLD: 0 %

## 2024-03-10 LAB
CFDNA.FET/CFDNA.TOTAL SFR FETUS: NORMAL %
CITATION REF LAB TEST: NORMAL
FET 13+18+21+X+Y ANEUP PLAS.CFDNA: NEGATIVE
FET CHR 21 TS PLAS.CFDNA QL: NEGATIVE
FET CHR 21 TS PLAS.CFDNA QL: NEGATIVE
FET MS X RISK WBC.DNA+CFDNA QL: NOT DETECTED
FET SEX PLAS.CFDNA DOSAGE CFDNA: NORMAL
FET TS 13 RISK PLAS.CFDNA QL: NEGATIVE
FET X + Y ANEUP RISK PLAS.CFDNA SEQ-IMP: NOT DETECTED
GA EST FROM CONCEPTION DATE: NORMAL D
GESTATIONAL AGE > 9:: YES
LAB DIRECTOR NAME PROVIDER: NORMAL
LAB DIRECTOR NAME PROVIDER: NORMAL
LABORATORY COMMENT REPORT: NORMAL
LIMITATIONS OF THE TEST: NORMAL
NEGATIVE PREDICTIVE VALUE: NORMAL
PERFORMANCE CHARACTERISTICS: NORMAL
POSITIVE PREDICTIVE VALUE: NORMAL
REF LAB TEST METHOD: NORMAL
SL AMB NOTE:: NORMAL
TEST PERFORMANCE INFO SPEC: NORMAL

## 2024-03-12 ENCOUNTER — TELEPHONE (OUTPATIENT)
Age: 34
End: 2024-03-12

## 2024-03-12 NOTE — TELEPHONE ENCOUNTER
----- Message from Maria Del Rosario Renteria MD sent at 3/11/2024  7:48 AM EDT -----  Hi  staff,  I reviewed this patient's NIPS, which resulted as low risk. She is unable to view her results as she does not have MyChart. Can she be contacted to ensure she is aware of the results?  Thank you!

## 2024-03-14 ENCOUNTER — TELEPHONE (OUTPATIENT)
Age: 34
End: 2024-03-14

## 2024-03-14 NOTE — TELEPHONE ENCOUNTER
Pts partner called in wanting to review results of blood work that reports fetal sex. He is not on pts communication consent. Asked that we call pt to review. Attempted call to pt, went to voicemail which is not set up.

## 2024-03-14 NOTE — TELEPHONE ENCOUNTER
Patient called into office to review genetic results.      Interpretor #[id number] used to review test results.      Patient aware of results and recommendations.  No further questions at this time

## 2024-04-03 ENCOUNTER — ROUTINE PRENATAL (OUTPATIENT)
Dept: OBGYN CLINIC | Facility: CLINIC | Age: 34
End: 2024-04-03

## 2024-04-03 VITALS
HEART RATE: 80 BPM | DIASTOLIC BLOOD PRESSURE: 73 MMHG | SYSTOLIC BLOOD PRESSURE: 112 MMHG | BODY MASS INDEX: 44.9 KG/M2 | WEIGHT: 244 LBS | HEIGHT: 62 IN

## 2024-04-03 DIAGNOSIS — Z23 ENCOUNTER FOR IMMUNIZATION: ICD-10-CM

## 2024-04-03 DIAGNOSIS — Z34.93 PRENATAL CARE IN THIRD TRIMESTER: Primary | ICD-10-CM

## 2024-04-03 DIAGNOSIS — O99.013 MATERNAL IRON DEFICIENCY ANEMIA COMPLICATING PREGNANCY IN THIRD TRIMESTER: ICD-10-CM

## 2024-04-03 DIAGNOSIS — Z3A.28 28 WEEKS GESTATION OF PREGNANCY: ICD-10-CM

## 2024-04-03 DIAGNOSIS — D50.9 MATERNAL IRON DEFICIENCY ANEMIA COMPLICATING PREGNANCY IN THIRD TRIMESTER: ICD-10-CM

## 2024-04-03 PROBLEM — O99.019 MATERNAL IRON DEFICIENCY ANEMIA COMPLICATING PREGNANCY: Status: ACTIVE | Noted: 2024-04-03

## 2024-04-03 PROCEDURE — 90715 TDAP VACCINE 7 YRS/> IM: CPT | Performed by: NURSE PRACTITIONER

## 2024-04-03 PROCEDURE — 90471 IMMUNIZATION ADMIN: CPT | Performed by: NURSE PRACTITIONER

## 2024-04-03 PROCEDURE — 99213 OFFICE O/P EST LOW 20 MIN: CPT | Performed by: NURSE PRACTITIONER

## 2024-04-03 PROCEDURE — G9920 SCRNING PERF AND NEGATIVE: HCPCS | Performed by: NURSE PRACTITIONER

## 2024-04-03 RX ORDER — FERROUS SULFATE 324(65)MG
324 TABLET, DELAYED RELEASE (ENTERIC COATED) ORAL
Qty: 30 TABLET | Refills: 5 | Status: SHIPPED | OUTPATIENT
Start: 2024-04-03

## 2024-04-03 NOTE — PATIENT INSTRUCTIONS
Jessica por ricketts confianza en nuestro equipo.   Le agradecemos y agradecemos leoncio comentarios.   Si recibe radha encuesta nuestra, tómese unos momentos para informarnos cómo estamos.   Sinceramente,  OSORIO SuarezNP       El Tercer Trimestre  (28-42 semanas)   RICKETTS BEBÉ   ·        ricketts bebé chupa ricketts dedo ahora!   ·        ricketts bebé puede oír voces y responder al tacto... habla con él o bhaskar!!   ·        el cerebro de ricketts bebé crece y se desarrolla más en los últimos 2 meses de embarazo   ·        davin y huesos del bebé son suaves y flexibles para que quepan por el canal del parto   ·        los movimientos del bebé cambian hacia el final del embarazo porque hay menos espacio para patear y estiramientos en tu vientre   ·        los pulmones del bebé no están completamente desarrollados y totalmente preparados para respirar por leoncio propios hasta el último 3-4 semanas antes de ricketts fecha de vencimiento     TU CUERPO   ·        ricketts vientre está creciendo mucho ahora   ·        será más difícil dormir berna de noche o ser tan activos olimpia eres generalmente   ·        puede sudar más de lo habitual   ·        serás más desequilibrado... tenga cuidado de no caer!   ·        Usted puede desarrollar hemorroides (qué pueden ser dolorosas y hacen difícil sentarse)   ·        los dos últimos meses del embarazo puede ser muy incómodos con indira de espalda, indira de davin y ardor de estómago   ·        Puedes empezar a tener contracciones... mientras son irregulares y menos de 5 por hora, esto es radha parte normal de ricketts cuerpo a punto de tener un bebé   ·        el eileen uterino puede empezar a dilatar y abrir arriba... para estar listo para el parto   ·        Usted puede encontrarse que necesitan hacer orinar muy a menudo... porque el bebé está presionando mucho la vejiga   ·        Usted puede quedarse corta de respiracion más rápido de lo duc          CUENTAS DEL RETROCESO FETAL    En el tercer trimestre (después de 28  semanas de gestación) deben realizar patada fetal cuentas cada día. [place] bebé debe moverse al menos 10 veces en 2 horas jeremy un tiempo activo, radha vez al día.    Elegir el atime del día cuando el bebé es más activo. Trate de hacerlo a la misma hora cada día. Entrar en radha posición cómoda y luego escribir el tiempo que tu bebé se mueve blaise. Cuenta cada movimiento hasta que el bebé se mueve 10 veces. Estos movimientos incluyen patadas, puñetazos, codazos, revolotea o rollos. [place] puede tardar entre 5 minutos a 2 horas. Anote el tiempo que sientes movimiento 10 del bebé.    Si ha pasado 2 horas y tu bebé no se ha movido al menos 10 veces, usted debe llamar a la oficina de inmediato. [phone]          CARLOTA DE PARTO PREMATURO     Cuando llamar a [phone]:  * Tengo que llamar inmediatamente si tengo incluso radha pequeña cantidad de LIQUIDO de mi vagina, con o sin contracciones.   * Tengo que llamar si estoy SANGRADO de mi vagina.   * Tengo que llamar si tengo COLICOS que continúa después de beber 2 ó 3 vasos de agua y acostados en mi lado jeremy radha hora y que se siente olimpia que estoy teniendo un período.   * Tengo que llamar si siento CONTRACCIONES más de 4 veces en radha hora quando siento que mi bouchra se mantiene dura después de que trato de beber 2-3 vasos del agua y acostarme en mi lado jeremy radha hora.   * Tengo que llamar si frances un cambio de mi FLUJO vaginal.   * Tengo que llamar si siento la [place] que siente que el bebé aprieta en mi vagina y dura más de radha hora.   * Tengo que llamar si tengo DOLOR BAJO DE LA ESPALDA que es nueva y está cerca de mi cóccix. Puede entrar y salir varias veces jeremy radha hora o quedarse allí constantemente.         LA PRE-ECLAMPSIA    ¿Qué es? La preeclampsia es radha enfermedad grave que puede ocurrir jeremy el embarazo se relaciona con la presión arterial nayana. Le puede pasar a cualquier doroteo.     ¿Por qué Yes importarme? Las mujeres Care preeclampsia  tienen riesgos graves pueden incluir convulsiones, trazo, daños en los órganos, nacimiento prematuro de ricketts bebé. En los peor de los casos, puede causar la muerte de la madre y ricketts bebé.     ¿Qué lashonda pagar atención radha? Signos y síntomas de la preeclampsia pueden incluir:   * Inflamación severa reji de la jensen o las   * Dolor de davin todavía presente después de acacia tomado Tylenol   * Riccardo manchas o cambios en [place]   * Dolor en la parte superior del abdomen o hombro   * [place] náuseas y vómitos (en la segunda mitad del embarazo)   * Aumento de peso repentino   * Dificultad para respirar     ¿Qué lashonda hacer? Si usted experimenta alguno de los síntomas de la preeclampsia, comuníquese con ricketts proveedor de OB. Encontrar la preeclampsia temprana es importante para usted y ricketts bebé. Llámanos al [phone].          LACTANCIA MATERNA     BENEFICIOS PARA LOS BEBÉS   ·       sistemas inmunológicos más rigoberto (menos alergias, eczema, asma y cáncer infantil)   ·       menos diarrea y estreñimiento u otras enfermedades GI   ·       menos resfriados e infecciones del oído   ·       mejor visión y dientes (menos cavidades)   ·       mejora el IQ   ·       menores tasas de diabetes y obesidad en la infancia     BENEFICIOS PARA LAS MADRES   ·        promueve la pérdida de peso más rápida después del parto   ·        angeli riesgo para la depresión postparto   ·        angeli riesgo para los cánceres de mama, útero y ovario   ·        angeli riesgo para la osteoporosis en desarrollo con la edad   ·        siempre es más fácil que fórmula - correcto, limpio, y la temperatura adecuada   ·        menos costosa que la fórmula es gratis!!!     CLAVES PARA RADHA LACTANCIA EXITOSA   ·        mantener bebé piel a piel hasta después de la primera alimentación evento   ·        tener a bebé en ricketts cuarto con usted jeremy ricketts estadía en el hospital después del parto   ·        evitar cualquier botella de alimentación (a menos que sea  médicamente necesario)   ·        limitar el uso de chupones y pañales   ·        Pida ayuda si usted está teniendo problemas... consultores de lactancia (que se especializan en la lactancia) están disponibles para ayudarle a   ·        radha dieta saludable para mamá... comiendo radha variedad de alimentos y raciones con moderación     COSAS QUE DEBES SABER SOBRE LA LACTANCIA   ·        mayoría de los medicamentos se considera compatible con la lactancia materna por la [employer], daryl puedes consultarlo con ricketts médico o en lactancia antes de ayala un medicamento nuevo... para estar seguro es seguro   ·        alcohol (cerveza, vino, licor) puede transmitirse de la madre al bebé a través de la leche materna... un ocasional, social bebida es considerado aceptable por la [employer]... más que eso deben evitarse   ·        la lactancia materna es NO es un método para evitar embarazo   ·        nicotina (cigarrillos) puede pasar de la madre al bebé a través de la leche materna... sin embargo, para las madres que fuman, es aún más saludable para amamantar que use la fórmula   ·        cafeína debería limitarse a 1-3 tazas por día... incluye café, refrescos, bebidas energéticas           MASAJE EN LA JOSE PERINEAL O VAGINAL    Que puedo hacer ahora para reducir las probabilidades de desgarro jeremy el parto?  Masaje alrededor del orificio de la vagina realizado por usted misma (o por ricketts barber).  El masaje en esta jose, ya sea antes del parto o jeremy la segunda etapa del parto, puede reducir la probabilidad de desgarro perineal jeremy el parto.  Asimismo, el uso de compresas tibias en el perineo jeremy la etapa expulsiva del parto puede reducir la pravedad del desparro.  [place] sucedera jeremy la etapa expulsiva del parto.  En casa tambien puede reducir las probabilidades de sufrir lesiones durnate el parto de con masajes en la jose perineal.    Quien?  Todas las mujeres  embarazadas a partir de, aproximadamente, las 34 semanas de embarazo.    Cuando?  Usted o ricketts barber deben hacer masajes en la radha vaginal trish 5 a 10 minutos de 1 a 4 veces por semana.    Adwoa?  Use radha mezcla de agua y aceite de almendras, rajeev u garcia con 1 o 2 dedos (debbie la comodidad).  Inserte los dedos en la vagina entre 3 y 5cm.  Aplique presion general hacia abajo y hacia los costados trish 5 a 10 minutos entre las 3 y las 9 horas, de 1 a 4 veces por semana.          SENALES TRISH EL EMBARAZO  Llame a nuestra oficina al [phone] para cualquiera de los siguientes:    1. Sangrado vaginal  2. Dolor abdominal sharona que no desaparece.  3. Fiebre (más de 100.4 y no se patsy con Tylenol)  4. Vómitos persistentes que bermeo más de 24 horas.  5. dolor de pecho  6. Dolor o ardor al orinar.  7. Dolor de davin severo que no se resuelve con Tylenol  8. Visión borrosa o natasha puntos en ricketts visión.  9. Hinchazón repentina de ricketts jensen o reji.  10. Enrojecimiento, hinchazón o dolor en radha pierna.  11. Un aumento de peso repentino en pocos días.  12. Contar los movimientos fetales del bebé. (después de 28 semanas o el sexto mes de embarazo)  13. Radha pérdida de líquido acuoso de la vagina: puede ser un chorro, un goteo o radha humedad continua  14. Después de 20 semanas de embarazo, calambres rítmicos (más de 4 por hora) o menstruales adwoa dolor bajo / pélvico          VACUNAS TRISH EL EMBARAZO    TDAP  La tos ferina (o tos ferina) puede ser grave para cualquier persona, daryl para ricketts recién nacido, puede ser mortal. Hasta 20 recién nacidos mueren cada año en los [place] debido a la tos ferina. Aproximadamente la mitad de los bebés menores de 1 año de edad que contraen tos ferina necesitan tratamiento en el hospital. Cuanto más joven es el bebé cuando él o bhaskar son la tos ferina, más probable es que él o bhaskar tendrá que ser tratado en un hospital. Cuando usted recibe la vacuna contra la tos ferina (Tdap)  jeremy ricketts embarazo, ricketts cuerpo creará anticuerpos protectores y algunos de ellos pasan a ricketts bebé antes de nacer. Estos anticuerpos pueden ayudar a proteger a ricketts bebé contraigan la tos ferina hasta que tienen edad suficiente para recibir la vacuna ellos mismos (generalmente alrededor de 6 meses de edad).      INFLUENZA  Cambios en las funciones inmunológica, del corazón y pulmón jeremy el embarazo te hacen más susceptible a padecer seriamente enfermo de la gripe. Coger la gripe también aumenta las posibilidades de problemas graves para ricketts bebé en desarrollo, incluyendo la entrega y el trabajo de parto prematuro. Se recomienda que todas las mujeres que están embarazadas jeremy la temporada de gripe deben recibir radha vacuna contra la influenza.

## 2024-04-03 NOTE — PROGRESS NOTES
PT given 28 week teaching and tdap on left deltoid.     NDC#:45752-747-81  Lot#:[id number]  Exp date:01/18/2026

## 2024-04-03 NOTE — PROGRESS NOTES
Adina Carvalho presents today for routine OB visit at 28w2d.  Blood Pressure: 112/73  Fn=313 kg (244 lb); Body mass index is 44.63 kg/m².; TWG=10.9 kg (24 lb)  Fetal Heart Rate: 133  Abdomen: gravid, soft, non-tender.  She reports no complaints.  Denies uterine contractions.  Denies vaginal bleeding or leaking of fluid.  Reports adequate fetal movement of at least 10 movements in 2 hours once daily.  Scheduled for ultrasound tomorrow.  Reviewed premature labor precautions and fetal kick counts.  Advised to continue medications and return in 2 weeks.      Current Outpatient Medications   Medication Instructions    albuterol (ProAir HFA) 90 mcg/act inhaler 2 puffs, Inhalation, Every 6 hours PRN    aspirin (ECOTRIN LOW STRENGTH) 162 mg, Oral, Daily    ferrous sulfate 324 mg, Oral, Daily before breakfast    fluticasone (Flovent HFA) 220 mcg/act inhaler 2 puffs, Inhalation, 2 times daily, Rinse mouth after use.    Prenatal Vit-Fe Fumarate-FA (Prenatal Plus Vitamin/Mineral) 27-1 MG TABS 1 tablet, Oral, Daily       Laboratory workup: initial OB & 28 week labs (done 3/5/24)    Genetic Screening: NIPS negative, MSAFP not done    Vaccinations: influenza (declined 1/10/24); Tdap (given 4/3/24); RSV (not indicated outside RSV season); COVID-19 (declined 1/27/24)    Postpartum contraception: desires OCP's    Fetal Ultrasounds:  12/22/23 (13w4d) EDC confirmed  3/5/24 (24w1d) no previa, EFW=41%, AC=52%, CHIO=15.8cm, kevin WNL w/missed views      G2 Problems (from 12/22/23 to present)       Problem Noted Resolved    Maternal anemia 4/3/2024 by TAPAN Suarez No    Overview Signed 4/3/2024  4:05 PM by TAPAN Suarez     Needs Fe supplement - given Rx 4/3/24         LSIL pap/+ other HRHPV 2/10/2024 by TAPAN Suarez No    Overview Addendum 4/3/2024  3:40 PM by TAPAN Suarez     Needs colposcopy - done 3/6/24 (impression LSIL)  Needs repeat pap in 1 year         Asthma during pregnancy  1/27/2024 by TAPAN Suarez No    Overview Signed 1/27/2024 11:29 AM by TAPAN Suarez     Albuterol prn  Flovent BID since pre-gestation         Short interval pregnancy 1/27/2024 by TAPAN Suarez No    Overview Signed 1/27/2024 11:30 AM by TAPAN Suarez     Serial growth scans         Hx of PTD @36 weeks 1/27/2024 by TAPAN Suarez No    Overview Addendum 4/3/2024  3:43 PM by TAPAN Suarez     PPROM @36w2d  Needs MFM consultation - done 3/5/24  Serial CL measurements - did not have done         History of c/s x1 1/27/2024 by TAPAN Suarez No    Overview Addendum 1/27/2024 11:37 AM by TAPAN Suarez     8/2023 Op note documents LTV uterine incision  Desires repeat c/s         Prior IUGR 1/27/2024 by TAPAN Suarez No    Overview Signed 1/27/2024 11:36 AM by TAPAN Suarez     Serial growth scans         Obesity affecting pregnancy 1/27/2024 by TAPAN Suarez No    Overview Addendum 4/3/2024  3:40 PM by TAPAN Suarez     Pre-pregnant BMI=40.23  Needs early GDM screen - done WNL  Serial growth scans  Needs AFS @34 weeks

## 2024-04-04 ENCOUNTER — ULTRASOUND (OUTPATIENT)
Age: 34
End: 2024-04-04
Payer: COMMERCIAL

## 2024-04-04 VITALS
HEART RATE: 87 BPM | DIASTOLIC BLOOD PRESSURE: 68 MMHG | HEIGHT: 62 IN | BODY MASS INDEX: 45.38 KG/M2 | WEIGHT: 246.6 LBS | SYSTOLIC BLOOD PRESSURE: 128 MMHG

## 2024-04-04 DIAGNOSIS — O99.210 OBESITY AFFECTING PREGNANCY, ANTEPARTUM, UNSPECIFIED OBESITY TYPE: ICD-10-CM

## 2024-04-04 DIAGNOSIS — Z36.89 ENCOUNTER FOR ULTRASOUND TO CHECK FETAL GROWTH: Primary | ICD-10-CM

## 2024-04-04 DIAGNOSIS — Z3A.28 28 WEEKS GESTATION OF PREGNANCY: ICD-10-CM

## 2024-04-04 DIAGNOSIS — O09.299 PRIOR PREGNANCY COMPLICATED BY IUGR, ANTEPARTUM: ICD-10-CM

## 2024-04-04 DIAGNOSIS — O09.899 SHORT INTERVAL BETWEEN PREGNANCIES AFFECTING PREGNANCY, ANTEPARTUM: ICD-10-CM

## 2024-04-04 DIAGNOSIS — Z36.2 ENCOUNTER FOR FOLLOW-UP ULTRASOUND OF FETAL ANATOMY: ICD-10-CM

## 2024-04-04 PROCEDURE — 76816 OB US FOLLOW-UP PER FETUS: CPT | Performed by: OBSTETRICS & GYNECOLOGY

## 2024-04-04 PROCEDURE — 99212 OFFICE O/P EST SF 10 MIN: CPT | Performed by: OBSTETRICS & GYNECOLOGY

## 2024-04-04 NOTE — PROGRESS NOTES
"[facility]: Ms. Jerome Carvalho was seen today for fetal growth and followup missed anatomy ultrasound.  See ultrasound report under \"OB Procedures\" tab.   The time spent on this established patient on the encounter date included 5 minutes previsit service time reviewing records and precharting, 5 minutes face-to-face service time counseling regarding results and coordinating care, and  4 minutes charting, totalling 14 minutes.  Please don't hesitate to contact our office with any concerns or questions.  -Sierra Seo MD    "

## 2024-04-19 ENCOUNTER — ROUTINE PRENATAL (OUTPATIENT)
Dept: OBGYN CLINIC | Facility: CLINIC | Age: 34
End: 2024-04-19

## 2024-04-19 ENCOUNTER — APPOINTMENT (OUTPATIENT)
Dept: LAB | Facility: CLINIC | Age: 34
End: 2024-04-19
Payer: COMMERCIAL

## 2024-04-19 VITALS
SYSTOLIC BLOOD PRESSURE: 115 MMHG | DIASTOLIC BLOOD PRESSURE: 77 MMHG | HEIGHT: 62 IN | BODY MASS INDEX: 44.94 KG/M2 | WEIGHT: 244.2 LBS | HEART RATE: 83 BPM

## 2024-04-19 DIAGNOSIS — Z3A.30 30 WEEKS GESTATION OF PREGNANCY: Primary | ICD-10-CM

## 2024-04-19 DIAGNOSIS — Z3A.16 16 WEEKS GESTATION OF PREGNANCY: ICD-10-CM

## 2024-04-19 DIAGNOSIS — Z34.91 PRENATAL CARE IN FIRST TRIMESTER: ICD-10-CM

## 2024-04-19 DIAGNOSIS — Z3A.30 30 WEEKS GESTATION OF PREGNANCY: ICD-10-CM

## 2024-04-19 LAB
AMORPH URATE CRY URNS QL MICRO: ABNORMAL
BACTERIA UR QL AUTO: ABNORMAL /HPF
BILIRUB UR QL STRIP: NEGATIVE
CLARITY UR: ABNORMAL
COLOR UR: ABNORMAL
GLUCOSE UR STRIP-MCNC: NEGATIVE MG/DL
HGB UR QL STRIP.AUTO: NEGATIVE
KETONES UR STRIP-MCNC: NEGATIVE MG/DL
LEUKOCYTE ESTERASE UR QL STRIP: ABNORMAL
MUCOUS THREADS UR QL AUTO: ABNORMAL
NITRITE UR QL STRIP: NEGATIVE
NON-SQ EPI CELLS URNS QL MICRO: ABNORMAL /HPF
PH UR STRIP.AUTO: 6 [PH]
PROT UR STRIP-MCNC: ABNORMAL MG/DL
RBC #/AREA URNS AUTO: ABNORMAL /HPF
SP GR UR STRIP.AUTO: 1.02 (ref 1–1.03)
UROBILINOGEN UR STRIP-ACNC: <2 MG/DL
WBC #/AREA URNS AUTO: ABNORMAL /HPF

## 2024-04-19 PROCEDURE — 87086 URINE CULTURE/COLONY COUNT: CPT

## 2024-04-19 PROCEDURE — 99213 OFFICE O/P EST LOW 20 MIN: CPT | Performed by: FAMILY MEDICINE

## 2024-04-19 NOTE — PROGRESS NOTES
Adina Carvalho presents today for routine OB visit at 30w4d.  Blood Pressure: 115/77  Zs=720 kg (244 lb 3.2 oz); Body mass index is 44.66 kg/m².; TWG=11 kg (24 lb 3.2 oz)  Fetal Heart Rate: 145; Fundal Height (cm): 30 cm  Abdomen: gravid, soft, non-tender.  She reports no current concerns. Reports her asthma is well controlled with albuterol and flovent. She denies coughing, reports SOB less than 2 times a week and less, improved with albuterol.   Denies uterine contractions.  Denies vaginal bleeding or leaking of fluid.  Reports adequate fetal movement of at least 10 movements in 2 hours once daily.  Scheduled for ultrasound 5/16.  Reviewed premature labor precautions and fetal kick counts.  Discussed that, as patient did not have UA with reflex and Urine culture done prior, to do it now. Order placed.   Advised to continue medications and return in 2 weeks.       Current Outpatient Medications   Medication Instructions    albuterol (ProAir HFA) 90 mcg/act inhaler 2 puffs, Inhalation, Every 6 hours PRN    aspirin (ECOTRIN LOW STRENGTH) 162 mg, Oral, Daily    ferrous sulfate 324 mg, Oral, Daily before breakfast    fluticasone (Flovent HFA) 220 mcg/act inhaler 2 puffs, Inhalation, 2 times daily, Rinse mouth after use.    Prenatal Vit-Fe Fumarate-FA (Prenatal Plus Vitamin/Mineral) 27-1 MG TABS 1 tablet, Oral, Daily         G2 Problems (from 12/22/23 to present)       Problem Noted Resolved    Maternal anemia 4/3/2024 by TAPAN Suarez No    Overview Signed 4/3/2024  4:05 PM by TAPAN Suarez     Needs Fe supplement - given Rx 4/3/24         LSIL pap/+ other HRHPV 2/10/2024 by TAPAN Suarez No    Overview Addendum 4/3/2024  3:40 PM by TAPAN Suarez     Needs colposcopy - done 3/6/24 (impression LSIL)  Needs repeat pap in 1 year         Asthma during pregnancy 1/27/2024 by TAPAN Suarez No    Overview Signed 1/27/2024 11:29 AM by TAPAN Suarez      Albuterol prn  Flovent BID since pre-gestation         Short interval pregnancy 1/27/2024 by TAPAN Suarez No    Overview Signed 1/27/2024 11:30 AM by TAPAN Suarez     Serial growth scans         Hx of PTD @36 weeks 1/27/2024 by TAPAN Suarez No    Overview Addendum 4/3/2024  3:43 PM by TAPAN Suarez     PPROM @36w2d  Needs MFM consultation - done 3/5/24  Serial CL measurements - did not have done         History of c/s x1 1/27/2024 by TAPAN Suarez No    Overview Addendum 1/27/2024 11:37 AM by TAPAN Suarez     8/2023 Op note documents LTV uterine incision  Desires repeat c/s         Prior IUGR 1/27/2024 by TAPAN Suarez No    Overview Signed 1/27/2024 11:36 AM by TAPAN Suarez     Serial growth scans         Obesity affecting pregnancy 1/27/2024 by TAPAN Suarez No    Overview Addendum 4/3/2024  3:40 PM by TAPAN Suarez     Pre-pregnant BMI=40.23  Needs early GDM screen - done WNL  Serial growth scans  Needs AFS @34 weeks

## 2024-04-20 LAB — BACTERIA UR CULT: NORMAL

## 2024-05-02 ENCOUNTER — ROUTINE PRENATAL (OUTPATIENT)
Dept: OBGYN CLINIC | Facility: CLINIC | Age: 34
End: 2024-05-02

## 2024-05-02 VITALS
WEIGHT: 246 LBS | BODY MASS INDEX: 45.27 KG/M2 | HEART RATE: 96 BPM | DIASTOLIC BLOOD PRESSURE: 77 MMHG | SYSTOLIC BLOOD PRESSURE: 123 MMHG | HEIGHT: 62 IN

## 2024-05-02 DIAGNOSIS — Z3A.32 32 WEEKS GESTATION OF PREGNANCY: ICD-10-CM

## 2024-05-02 DIAGNOSIS — Z34.93 PRENATAL CARE IN THIRD TRIMESTER: Primary | ICD-10-CM

## 2024-05-02 PROCEDURE — 99213 OFFICE O/P EST LOW 20 MIN: CPT | Performed by: NURSE PRACTITIONER

## 2024-05-02 NOTE — PROGRESS NOTES
Adina Carvalho presents today for routine OB visit at 32w3d.  Blood Pressure: 123/77  Fl=767 kg (246 lb); Body mass index is 44.99 kg/m².; TWG=11.8 kg (26 lb)  Fetal Heart Rate: 132; Fundal Height (cm): 34 cm  Abdomen: gravid, soft, non-tender.  She reports increased need for rescue inhaler with warmer weather.  Denies uterine contractions.  Denies vaginal bleeding or leaking of fluid.  Reports adequate fetal movement of at least 10 movements in 2 hours once daily.  Scheduled for ultrasound 5/16/24.  Reviewed premature labor precautions and fetal kick counts.  Advised to continue medications and return in 2 weeks.      Current Outpatient Medications   Medication Instructions    albuterol (ProAir HFA) 90 mcg/act inhaler 2 puffs, Inhalation, Every 6 hours PRN    aspirin (ECOTRIN LOW STRENGTH) 162 mg, Oral, Daily    ferrous sulfate 324 mg, Oral, Daily before breakfast    fluticasone (Flovent HFA) 220 mcg/act inhaler 2 puffs, Inhalation, 2 times daily, Rinse mouth after use.    Prenatal Vit-Fe Fumarate-FA (Prenatal Plus Vitamin/Mineral) 27-1 MG TABS 1 tablet, Oral, Daily       Laboratory workup: initial OB & 28 week labs (done 3/5/24)    Genetic Screening: NIPS negative, MSAFP not done    Vaccinations: influenza (declined 1/10/24); Tdap (given 4/3/24); RSV (not indicated outside RSV season); COVID-19 (declined 1/27/24)    Postpartum contraception: desires OCP's    Fetal Ultrasounds:  12/22/23 (13w4d) EDC confirmed  3/5/24 (24w1d) no previa, EFW=41%, AC=52%, CHIO=15.8cm, kevin WNL w/missed views  4/4/24 (28w3d) EFW=29%, AC=52%, CHIO=12.0cm, kevin WNL w/missed views, breech      G2 Problems (from 12/22/23 to present)       Problem Noted Resolved    Maternal anemia 4/3/2024 by TAPAN Suarez No    Overview Addendum 5/2/2024  5:31 PM by TAPAN Suarez     Needs Fe supplement - taking         LSIL pap/+ other HRHPV 2/10/2024 by TAPAN Suarez No    Overview Addendum 4/3/2024  3:40 PM by  TAPAN Suarez     Needs colposcopy - done 3/6/24 (impression LSIL)  Needs repeat pap in 1 year         Asthma during pregnancy 1/27/2024 by TAPAN Suarez No    Overview Signed 1/27/2024 11:29 AM by TAPAN Suarez     Albuterol prn  Flovent BID since pre-gestation         Short interval pregnancy 1/27/2024 by TAPAN Suarez No    Overview Signed 1/27/2024 11:30 AM by TAPAN Suarez     Serial growth scans         Hx of PTD @36 weeks 1/27/2024 by TAPAN Suarez No    Overview Addendum 4/3/2024  3:43 PM by TAPAN Suarez     PPROM @36w2d  Needs MFM consultation - done 3/5/24  Serial CL measurements - did not have done         History of c/s x1 1/27/2024 by TAPAN Suarez No    Overview Addendum 1/27/2024 11:37 AM by TAPAN Suarez     8/2023 Op note documents LTV uterine incision  Desires repeat c/s         Prior IUGR 1/27/2024 by TAPAN Suarez No    Overview Signed 1/27/2024 11:36 AM by TAPAN Suarez     Serial growth scans         Obesity affecting pregnancy 1/27/2024 by TAPAN Suarez No    Overview Addendum 4/3/2024  3:40 PM by TAPAN Suarez     Pre-pregnant BMI=40.23  Needs early GDM screen - done WNL  Serial growth scans  Needs AFS @34 weeks

## 2024-05-16 ENCOUNTER — TELEPHONE (OUTPATIENT)
Dept: PERINATAL CARE | Facility: OTHER | Age: 34
End: 2024-05-16

## 2024-05-16 ENCOUNTER — ROUTINE PRENATAL (OUTPATIENT)
Dept: OBGYN CLINIC | Facility: CLINIC | Age: 34
End: 2024-05-16

## 2024-05-16 VITALS
SYSTOLIC BLOOD PRESSURE: 116 MMHG | WEIGHT: 246 LBS | BODY MASS INDEX: 44.99 KG/M2 | DIASTOLIC BLOOD PRESSURE: 78 MMHG | HEART RATE: 80 BPM

## 2024-05-16 DIAGNOSIS — Z34.93 PRENATAL CARE IN THIRD TRIMESTER: Primary | ICD-10-CM

## 2024-05-16 DIAGNOSIS — Z3A.34 34 WEEKS GESTATION OF PREGNANCY: ICD-10-CM

## 2024-05-16 PROCEDURE — 99213 OFFICE O/P EST LOW 20 MIN: CPT | Performed by: NURSE PRACTITIONER

## 2024-05-16 NOTE — TELEPHONE ENCOUNTER
Warm transfer of pt regarding her 11 AM ultrasound and non stress test with MFM. Pt stated she would not be able to make it to the appt by 11 and asked if she could move it to 11:30 or 12. Explained to pt we cannot push the appt back. Offered to set up ride for pt so they could make it within appt time kelvin period. Pt also stated she has no one to watch 2 year old child. Explained child policy to pt and that we would need to reschedule the appt. Pt hung up.

## 2024-05-16 NOTE — PROGRESS NOTES
Adina Carvalho presents today for routine OB visit at 34w3d.  Blood Pressure: 116/78  Ei=341 kg (246 lb); Body mass index is 44.99 kg/m².; TWG=11.8 kg (26 lb)  Fetal Heart Rate: 134; Fundal Height (cm): 34 cm  Abdomen: gravid, soft, non-tender.  She reports no complaints.  Reports rare mild uterine contractions.  Denies vaginal bleeding or leaking of fluid.  Reports adequate fetal movement of at least 10 movements in 2 hours once daily.  Scheduled for ultrasound today.  Reviewed premature labor precautions and fetal kick counts.  Advised to continue medications and return in 2 weeks.      Current Outpatient Medications   Medication Instructions    albuterol (ProAir HFA) 90 mcg/act inhaler 2 puffs, Inhalation, Every 6 hours PRN    aspirin (ECOTRIN LOW STRENGTH) 162 mg, Oral, Daily    ferrous sulfate 324 mg, Oral, Daily before breakfast    fluticasone (Flovent HFA) 220 mcg/act inhaler 2 puffs, Inhalation, 2 times daily, Rinse mouth after use.    Prenatal Vit-Fe Fumarate-FA (Prenatal Plus Vitamin/Mineral) 27-1 MG TABS 1 tablet, Oral, Daily       Laboratory workup: initial OB & 28 week labs (done 3/5/24)    Genetic Screening: NIPS negative, MSAFP not done    Vaccinations: influenza (declined 1/10/24); Tdap (given 4/3/24); RSV (not indicated outside RSV season); COVID-19 (declined 1/27/24)    Postpartum contraception: desires OCP's    Fetal Ultrasounds:  12/22/23 (13w4d) EDC confirmed  3/5/24 (24w1d) no previa, EFW=41%, AC=52%, CHIO=15.8cm, kevin WNL w/missed views  4/4/24 (28w3d) EFW=29%, AC=52%, CHIO=12.0cm, kevin WNL w/missed views, breech      G2 Problems (from 12/22/23 to present)       Problem Noted Resolved    Maternal anemia 4/3/2024 by TAPAN Suarez No    Overview Addendum 5/2/2024  5:31 PM by TAPAN Suarez     Needs Fe supplement - taking         LSIL pap/+ other HRHPV 2/10/2024 by TAPAN Suarez No    Overview Addendum 4/3/2024  3:40 PM by TAPAN Suarez     Needs  colposcopy - done 3/6/24 (impression LSIL)  Needs repeat pap in 1 year         Asthma during pregnancy 1/27/2024 by TAPAN Suarez No    Overview Signed 1/27/2024 11:29 AM by TAPAN Suarez     Albuterol prn  Flovent BID since pre-gestation         Short interval pregnancy 1/27/2024 by TAPAN Suarez No    Overview Signed 1/27/2024 11:30 AM by TPAAN Suarez     Serial growth scans         Hx of PTD @36 weeks 1/27/2024 by TAPAN Suarez No    Overview Addendum 4/3/2024  3:43 PM by TAPAN Suarez     PPROM @36w2d  Needs MFM consultation - done 3/5/24  Serial CL measurements - did not have done         History of c/s x1 1/27/2024 by TAPAN Suarez No    Overview Addendum 1/27/2024 11:37 AM by TAPAN Suarez     8/2023 Op note documents LTV uterine incision  Desires repeat c/s         Prior IUGR 1/27/2024 by TAPAN Suarez No    Overview Signed 1/27/2024 11:36 AM by TAPAN Suarez     Serial growth scans         Obesity affecting pregnancy 1/27/2024 by TAPAN Suarez No    Overview Addendum 5/16/2024  5:53 PM by TAPAN Suarez     Pre-pregnant BMI=40.23  Needs early GDM screen - done WNL  Serial growth scans  Needs AFS @34 weeks - scheduled w/MFM

## 2024-05-16 NOTE — PATIENT INSTRUCTIONS
Jessica por ricketts confianza en nuestro equipo.   Le agradecemos y agradecemos leoncio comentarios.   Si recibe radha encuesta nuestra, tómese unos momentos para informarnos cómo estamos.   Sinceramente,  OSORIO SuarezNP       El Tercer Trimestre  (28-42 semanas)   RICKETTS BEBÉ   ·        ricketts bebé chupa ricketts dedo ahora!   ·        ricketts bebé puede oír voces y responder al tacto... habla con él o bhaskar!!   ·        el cerebro de ricketts bebé crece y se desarrolla más en los últimos 2 meses de embarazo   ·        davin y huesos del bebé son suaves y flexibles para que quepan por el canal del parto   ·        los movimientos del bebé cambian hacia el final del embarazo porque hay menos espacio para patear y estiramientos en tu vientre   ·        los pulmones del bebé no están completamente desarrollados y totalmente preparados para respirar por leoncio propios hasta el último 3-4 semanas antes de ricketts fecha de vencimiento     TU CUERPO   ·        ricketts vientre está creciendo mucho ahora   ·        será más difícil dormir berna de noche o ser tan activos olimpia eres generalmente   ·        puede sudar más de lo habitual   ·        serás más desequilibrado... tenga cuidado de no caer!   ·        Usted puede desarrollar hemorroides (qué pueden ser dolorosas y hacen difícil sentarse)   ·        los dos últimos meses del embarazo puede ser muy incómodos con indira de espalda, indira de davin y ardor de estómago   ·        Puedes empezar a tener contracciones... mientras son irregulares y menos de 5 por hora, esto es radha parte normal de ricketts cuerpo a punto de tener un bebé   ·        el eileen uterino puede empezar a dilatar y abrir arriba... para estar listo para el parto   ·        Usted puede encontrarse que necesitan hacer orinar muy a menudo... porque el bebé está presionando mucho la vejiga   ·        Usted puede quedarse corta de respiracion más rápido de lo duc          CUENTAS DEL RETROCESO FETAL    En el tercer trimestre (después de 28  semanas de gestación) deben realizar patada fetal cuentas cada día. [place] bebé debe moverse al menos 10 veces en 2 horas jeremy un tiempo activo, radha vez al día.    Elegir el atime del día cuando el bebé es más activo. Trate de hacerlo a la misma hora cada día. Entrar en radha posición cómoda y luego escribir el tiempo que tu bebé se mueve blaise. Cuenta cada movimiento hasta que el bebé se mueve 10 veces. Estos movimientos incluyen patadas, puñetazos, codazos, revolotea o rollos. [place] puede tardar entre 5 minutos a 2 horas. Anote el tiempo que sientes movimiento 10 del bebé.    Si ha pasado 2 horas y tu bebé no se ha movido al menos 10 veces, usted debe llamar a la oficina de inmediato. [phone]          CARLOTA DE PARTO PREMATURO     Cuando llamar a [phone]:  * Tengo que llamar inmediatamente si tengo incluso radha pequeña cantidad de LIQUIDO de mi vagina, con o sin contracciones.   * Tengo que llamar si estoy SANGRADO de mi vagina.   * Tengo que llamar si tengo COLICOS que continúa después de beber 2 ó 3 vasos de agua y acostados en mi lado jeremy radha hora y que se siente olimpia que estoy teniendo un período.   * Tengo que llamar si siento CONTRACCIONES más de 4 veces en radha hora quando siento que mi bouchra se mantiene dura después de que trato de beber 2-3 vasos del agua y acostarme en mi lado jeremy radha hora.   * Tengo que llamar si frances un cambio de mi FLUJO vaginal.   * Tengo que llamar si siento la [place] que siente que el bebé aprieta en mi vagina y dura más de radha hora.   * Tengo que llamar si tengo DOLOR BAJO DE LA ESPALDA que es nueva y está cerca de mi cóccix. Puede entrar y salir varias veces jeremy radha hora o quedarse allí constantemente.         LA PRE-ECLAMPSIA    ¿Qué es? La preeclampsia es radha enfermedad grave que puede ocurrir jeremy el embarazo se relaciona con la presión arterial nayana. Le puede pasar a cualquier doroteo.     ¿Por qué Yes importarme? Las mujeres Care preeclampsia  tienen riesgos graves pueden incluir convulsiones, trazo, daños en los órganos, nacimiento prematuro de ricketts bebé. En los peor de los casos, puede causar la muerte de la madre y ricketts bebé.     ¿Qué lashonda pagar atención radha? Signos y síntomas de la preeclampsia pueden incluir:   * Inflamación severa reji de la jensen o las   * Dolor de davin todavía presente después de acacia tomado Tylenol   * Riccardo manchas o cambios en Lavista   * Dolor en la parte superior del abdomen o hombro   * [place] náuseas y vómitos (en la segunda mitad del embarazo)   * Aumento de peso repentino   * Dificultad para respirar     ¿Qué lashonda hacer? Si usted experimenta alguno de los síntomas de la preeclampsia, comuníquese con ricketts proveedor de OB. Encontrar la preeclampsia temprana es importante para usted y ricketts bebé. Llámanos al [phone].          LACTANCIA MATERNA     BENEFICIOS PARA LOS BEBÉS   ·       sistemas inmunológicos más rigoberto (menos alergias, eczema, asma y cáncer infantil)   ·       menos diarrea y estreñimiento u otras enfermedades GI   ·       menos resfriados e infecciones del oído   ·       mejor visión y dientes (menos cavidades)   ·       mejora el IQ   ·       menores tasas de diabetes y obesidad en la infancia     BENEFICIOS PARA LAS MADRES   ·        promueve la pérdida de peso más rápida después del parto   ·        angeli riesgo para la depresión postparto   ·        angeli riesgo para los cánceres de mama, útero y ovario   ·        angeli riesgo para la osteoporosis en desarrollo con la edad   ·        siempre es más fácil que fórmula - correcto, limpio, y la temperatura adecuada   ·        menos costosa que la fórmula es gratis!!!     CLAVES PARA RADHA LACTANCIA EXITOSA   ·        mantener bebé piel a piel hasta después de la primera alimentación evento   ·        tener a bebé en ricketts cuarto con usted jeremy ricketts estadía en el hospital después del parto   ·        evitar cualquier botella de alimentación (a menos que sea  médicamente necesario)   ·        limitar el uso de chupones y pañales   ·        Pida ayuda si usted está teniendo problemas... consultores de lactancia (que se especializan en la lactancia) están disponibles para ayudarle a   ·        radha dieta saludable para mamá... comiendo radha variedad de alimentos y raciones con moderación     COSAS QUE DEBES SABER SOBRE LA LACTANCIA   ·        mayoría de los medicamentos se considera compatible con la lactancia materna por la [employer], daryl puedes consultarlo con ricketts médico o en lactancia antes de ayala un medicamento nuevo... para estar seguro es seguro   ·        alcohol (cerveza, vino, licor) puede transmitirse de la madre al bebé a través de la leche materna... un ocasional, social bebida es considerado aceptable por la [employer]... más que eso deben evitarse   ·        la lactancia materna es NO es un método para evitar embarazo   ·        nicotina (cigarrillos) puede pasar de la madre al bebé a través de la leche materna... sin embargo, para las madres que fuman, es aún más saludable para amamantar que use la fórmula   ·        cafeína debería limitarse a 1-3 tazas por día... incluye café, refrescos, bebidas energéticas           MASAJE EN LA JOSE PERINEAL O VAGINAL    Que puedo hacer ahora para reducir las probabilidades de desgarro jeremy el parto?  Masaje alrededor del orificio de la vagina realizado por usted misma (o por ricketts barber).  El masaje en esta jose, ya sea antes del parto o jeremy la segunda etapa del parto, puede reducir la probabilidad de desgarro perineal jeremy el parto.  Asimismo, el uso de compresas tibias en el perineo jeremy la etapa expulsiva del parto puede reducir la pravedad del desparro.  Herrick sucedera jeremy la etapa expulsiva del parto.  En casa tambien puede reducir las probabilidades de sufrir lesiones durnate el parto de con masajes en la jose perineal.    Quien?  Todas las mujeres  embarazadas a partir de, aproximadamente, las 34 semanas de embarazo.    Cuando?  Usted o ricketts barber deben hacer masajes en la radha vaginal trish 5 a 10 minutos de 1 a 4 veces por semana.    Adwoa?  Use radha mezcla de agua y aceite de almendras, rajeev u garcia con 1 o 2 dedos (debbie la comodidad).  Inserte los dedos en la vagina entre 3 y 5cm.  Aplique presion general hacia abajo y hacia los costados trish 5 a 10 minutos entre las 3 y las 9 horas, de 1 a 4 veces por semana.          SENALES TRISH EL EMBARAZO  Llame a nuestra oficina al [phone] para cualquiera de los siguientes:    1. Sangrado vaginal  2. Dolor abdominal sharona que no desaparece.  3. Fiebre (más de 100.4 y no se patsy con Tylenol)  4. Vómitos persistentes que bermeo más de 24 horas.  5. dolor de pecho  6. Dolor o ardor al orinar.  7. Dolor de davin severo que no se resuelve con Tylenol  8. Visión borrosa o natasha puntos en ricketts visión.  9. Hinchazón repentina de ricketts jensen o reji.  10. Enrojecimiento, hinchazón o dolor en radha pierna.  11. Un aumento de peso repentino en pocos días.  12. Contar los movimientos fetales del bebé. (después de 28 semanas o el sexto mes de embarazo)  13. Radha pérdida de líquido acuoso de la vagina: puede ser un chorro, un goteo o radha humedad continua  14. Después de 20 semanas de embarazo, calambres rítmicos (más de 4 por hora) o menstruales adwoa dolor bajo / pélvico          VACUNAS TRISH EL EMBARAZO    TDAP  La tos ferina (o tos ferina) puede ser grave para cualquier persona, daryl para ricketts recién nacido, puede ser mortal. Hasta 20 recién nacidos mueren cada año en los [place] debido a la tos ferina. Aproximadamente la mitad de los bebés menores de 1 año de edad que contraen tos ferina necesitan tratamiento en el hospital. Cuanto más joven es el bebé cuando él o bhaskar son la tos ferina, más probable es que él o bhaskar tendrá que ser tratado en un hospital. Cuando usted recibe la vacuna contra la tos ferina (Tdap)  jeremy ricketts embarazo, ricketts cuerpo creará anticuerpos protectores y algunos de ellos pasan a ricketts bebé antes de nacer. Estos anticuerpos pueden ayudar a proteger a ricketts bebé contraigan la tos ferina hasta que tienen edad suficiente para recibir la vacuna ellos mismos (generalmente alrededor de 6 meses de edad).      INFLUENZA  Cambios en las funciones inmunológica, del corazón y pulmón jeremy el embarazo te hacen más susceptible a padecer seriamente enfermo de la gripe. Coger la gripe también aumenta las posibilidades de problemas graves para ricketts bebé en desarrollo, incluyendo la entrega y el trabajo de parto prematuro. Se recomienda que todas las mujeres que están embarazadas jeremy la temporada de gripe deben recibir radha vacuna contra la influenza.

## 2024-05-21 ENCOUNTER — ULTRASOUND (OUTPATIENT)
Age: 34
End: 2024-05-21
Payer: COMMERCIAL

## 2024-05-21 VITALS
DIASTOLIC BLOOD PRESSURE: 70 MMHG | HEIGHT: 62 IN | WEIGHT: 248.4 LBS | HEART RATE: 78 BPM | SYSTOLIC BLOOD PRESSURE: 122 MMHG | BODY MASS INDEX: 45.71 KG/M2

## 2024-05-21 DIAGNOSIS — O99.213 OBESITY AFFECTING PREGNANCY IN THIRD TRIMESTER, UNSPECIFIED OBESITY TYPE: Primary | ICD-10-CM

## 2024-05-21 DIAGNOSIS — Z3A.35 35 WEEKS GESTATION OF PREGNANCY: ICD-10-CM

## 2024-05-21 PROCEDURE — 76815 OB US LIMITED FETUS(S): CPT | Performed by: OBSTETRICS & GYNECOLOGY

## 2024-05-21 PROCEDURE — 59025 FETAL NON-STRESS TEST: CPT | Performed by: OBSTETRICS & GYNECOLOGY

## 2024-05-21 NOTE — PROGRESS NOTES
This patient received  care under my supervision on 24 at 35w1d gestational age at [facility].  NST is reactive.  -Sierra Seo MD

## 2024-05-23 ENCOUNTER — ROUTINE PRENATAL (OUTPATIENT)
Age: 34
End: 2024-05-23
Payer: COMMERCIAL

## 2024-05-23 VITALS
SYSTOLIC BLOOD PRESSURE: 112 MMHG | WEIGHT: 249.2 LBS | HEART RATE: 106 BPM | HEIGHT: 62 IN | BODY MASS INDEX: 45.86 KG/M2 | DIASTOLIC BLOOD PRESSURE: 72 MMHG

## 2024-05-23 DIAGNOSIS — O99.213 OBESITY AFFECTING PREGNANCY IN THIRD TRIMESTER, UNSPECIFIED OBESITY TYPE: Primary | ICD-10-CM

## 2024-05-23 DIAGNOSIS — Z3A.35 35 WEEKS GESTATION OF PREGNANCY: ICD-10-CM

## 2024-05-23 PROCEDURE — 59025 FETAL NON-STRESS TEST: CPT | Performed by: OBSTETRICS & GYNECOLOGY

## 2024-05-23 NOTE — PROGRESS NOTES
Repeat Non-Stress Testing:    Patient verbalizes +FM. Pt denies ALL:               Leaking of fluid   Contractions   Vaginal bleeding   Decreased fetal movement    Patient is performing daily kick counts. Patient has no questions or concerns. All of the above discussed using [employer]  #[id number].

## 2024-05-29 PROBLEM — Z3A.36 36 WEEKS GESTATION OF PREGNANCY: Status: ACTIVE | Noted: 2024-01-27

## 2024-05-30 ENCOUNTER — ROUTINE PRENATAL (OUTPATIENT)
Dept: OBGYN CLINIC | Facility: CLINIC | Age: 34
End: 2024-05-30

## 2024-05-30 VITALS
DIASTOLIC BLOOD PRESSURE: 79 MMHG | HEIGHT: 62 IN | BODY MASS INDEX: 45.42 KG/M2 | HEART RATE: 85 BPM | SYSTOLIC BLOOD PRESSURE: 115 MMHG | WEIGHT: 246.8 LBS

## 2024-05-30 DIAGNOSIS — Z34.93 PRENATAL CARE IN THIRD TRIMESTER: Primary | ICD-10-CM

## 2024-05-30 DIAGNOSIS — O34.219 HISTORY OF CESAREAN DELIVERY, ANTEPARTUM: ICD-10-CM

## 2024-05-30 DIAGNOSIS — Z3A.36 36 WEEKS GESTATION OF PREGNANCY: ICD-10-CM

## 2024-05-30 DIAGNOSIS — O99.213 OBESITY AFFECTING PREGNANCY IN THIRD TRIMESTER, UNSPECIFIED OBESITY TYPE: ICD-10-CM

## 2024-05-30 PROCEDURE — 87184 SC STD DISK METHOD PER PLATE: CPT | Performed by: OBSTETRICS & GYNECOLOGY

## 2024-05-30 PROCEDURE — 99213 OFFICE O/P EST LOW 20 MIN: CPT | Performed by: OBSTETRICS & GYNECOLOGY

## 2024-05-30 PROCEDURE — 87150 DNA/RNA AMPLIFIED PROBE: CPT | Performed by: OBSTETRICS & GYNECOLOGY

## 2024-05-30 NOTE — ASSESSMENT & PLAN NOTE
Continue weekly NST and CHIO at [facility] until delivery due to pre-gravid BMI

## 2024-05-30 NOTE — PROGRESS NOTES
[facility] Obstetric Visit    Adina Carvalho is a  who presents today for routine OB visit at 36w3d.    S:  She reports pelvic pressure.    Denies regular uterine contractions.  Denies vaginal bleeding or leaking of fluid.  Reports adequate fetal movement daily.      O:  Blood Pressure: 115/79  Zc=604 kg (246 lb 12.8 oz); Body mass index is 45.14 kg/m².; TWG= 12.2 kg (26 lb 12.8 oz)  Fetal Heart Rate: 143; Fundal Height (cm): 36 cm    Pulm: Non-labored breathing.  Abdomen: gravid, soft, non-tender.    Problem List Items Addressed This Visit          Surgery/Wound/Pain    History of c/s x1     [facility] scheduled [facility] 2024 at [facility]            Obstetrics/Gynecology    36 weeks gestation of pregnancy     Contraception plan is pills  Monitor fetal movement daily  Return in 1 week for routine visit.    I consented the patient for delivery today. As she delivers repeat  section, we discussed the risks  including bleeding, infection, injury to surrounding structures including the bowel and bladder as well as peripartum hysterectomy.     She was counseled that there are medical and surgical methods to manage excessive postpartum bleeding. She was counseled that in the event of excessive blood loss, she may require blood transfusion which includes a small risk of blood borne diseases such as hepatitis and HIV. The patient is OK with receiving a blood transfusion if necessary.  The patient had an opportunity to ask questions and signed consent.            Obesity affecting pregnancy     Continue weekly NST and CHIO at [facility] until delivery due to pre-gravid BMI           Other Visit Diagnoses       Prenatal care in third trimester    -  Primary    Relevant Orders    Strep B DNA probe, amplification            Patient discussed with Dr. Lawler.   [id number] was used.    Frieda Campbell MD  PGY-IV, OB/GYN  2024, 5:36 PM

## 2024-05-30 NOTE — ASSESSMENT & PLAN NOTE
Contraception plan is pills  Monitor fetal movement daily  Return in 1 week for routine visit.    I consented the patient for delivery today. As she delivers repeat  section, we discussed the risks  including bleeding, infection, injury to surrounding structures including the bowel and bladder as well as peripartum hysterectomy.     She was counseled that there are medical and surgical methods to manage excessive postpartum bleeding. She was counseled that in the event of excessive blood loss, she may require blood transfusion which includes a small risk of blood borne diseases such as hepatitis and HIV. The patient is OK with receiving a blood transfusion if necessary.  The patient had an opportunity to ask questions and signed consent.

## 2024-06-03 PROBLEM — O99.820 GBS (GROUP B STREPTOCOCCUS CARRIER), +RV CULTURE, CURRENTLY PREGNANT: Status: ACTIVE | Noted: 2024-06-03

## 2024-06-03 LAB — GP B STREP DNA SPEC QL NAA+PROBE: POSITIVE

## 2024-06-06 ENCOUNTER — ROUTINE PRENATAL (OUTPATIENT)
Dept: OBGYN CLINIC | Facility: CLINIC | Age: 34
End: 2024-06-06

## 2024-06-06 VITALS
HEART RATE: 89 BPM | DIASTOLIC BLOOD PRESSURE: 73 MMHG | HEIGHT: 62 IN | BODY MASS INDEX: 45.82 KG/M2 | WEIGHT: 249 LBS | SYSTOLIC BLOOD PRESSURE: 113 MMHG

## 2024-06-06 DIAGNOSIS — Z34.93 PRENATAL CARE IN THIRD TRIMESTER: Primary | ICD-10-CM

## 2024-06-06 DIAGNOSIS — Z3A.37 37 WEEKS GESTATION OF PREGNANCY: ICD-10-CM

## 2024-06-06 LAB
GP B STREP DNA SPEC QL NAA+PROBE: ABNORMAL
GP B STREP DNA SPEC QL NAA+PROBE: ABNORMAL

## 2024-06-06 PROCEDURE — 99213 OFFICE O/P EST LOW 20 MIN: CPT | Performed by: NURSE PRACTITIONER

## 2024-06-06 NOTE — PATIENT INSTRUCTIONS
Jessica por ricketts confianza en nuestro equipo.   Le agradecemos y agradecemos leoncio comentarios.   Si recibe radha encuesta nuestra, tómese unos momentos para informarnos cómo estamos.   Sinceramente,  TAPAN Suarez       CARLOTA DE PARTO   Llame a nuestra oficina al [phone] para cualquiera de los siguientes:    * Necesito llamar inmediatamente yo si tengo incluso radha pequeña cantidad de LIQUIDO se escapa de mi vagina, con o sin contracciones.   * Autumn llamar si tengo SANGRADO radha cantidad igual o más que un período. Radha pequeña cantidad de flujo vaginal sangriento es normal al final del embarazo.   * Autumn llamar si tengo CONTRACCIONES cada brandy minutos jeremy al menos radha hora. Necesito un reloj para tiempo. Yo autumn tiempo desde el comienzo de radha contracción hasta el comienzo de la siguiente.   * De ser necesario ANTES DE  ir al hospital.   * Necesito tener un plan para TRANSPORTE a ir al hospital cuando estoy en trabajo de parto. Trabajo generalmente no es radha emergencia que requiere radha ambulancia.          CUENTAS DEL RETROCESO FETAL    En el tercer trimestre (después de 28 semanas de gestación) deben realizar patada fetal cuentas cada día. Ricketts bebé debe moverse al menos 10 veces en 2 horas jeremy un tiempo activo, radha vez al día.    Elegir el atime del día cuando el bebé es más activo. Trate de hacerlo a la misma hora cada día. Entrar en radha posición cómoda y luego escribir el tiempo que tu bebé se mueve blaise. Cuenta cada movimiento hasta que el bebé se mueve 10 veces. Estos movimientos incluyen patadas, puñetazos, codazos, revolotea o rollos. [place] puede tardar entre 5 minutos a 2 horas. Anote el tiempo que sientes movimiento 10 del bebé.    Si ha pasado 2 horas y tu bebé no se ha movido al menos 10 veces, usted debe llamar a la oficina de inmediato. [phone]          MASAJE EN LA JOSE PERINEAL O VAGINAL    Que puedo hacer ahora para reducir las probabilidades de desgarro jeremy el  parto?  Masaje alrededor del orificio de la vagina realizado por usted misma (o por ricketts barber).  El masaje en esta radha, ya sea antes del parto o jeremy la segunda etapa del parto, puede reducir la probabilidad de desgarro perineal jeremy el parto.  Asimismo, el uso de compresas tibias en el perineo jeremy la etapa expulsiva del parto puede reducir la pravedad del desparro.  [place] sucedera jeremy la etapa expulsiva del parto.  En casa tambien puede reducir las probabilidades de sufrir lesiones durnate el parto de con masajes en la radha perineal.    Quien?  Todas las mujeres embarazadas a partir de, aproximadamente, las 34 semanas de embarazo.    Cuando?  Usted o ricketts barber deben hacer masajes en la radha vaginal jeremy 5 a 10 minutos de 1 a 4 veces por semana.    Howe?  Use radha mezcla de agua y aceite de almendras, rajeev u garcia con 1 o 2 dedos (debbie la comodidad).  Inserte los dedos en la vagina entre 3 y 5cm.  Aplique presion general hacia abajo y hacia los costados jeremy 5 a 10 minutos entre las 3 y las 9 horas, de 1 a 4 veces por semana.          GROUP B STREP    Group B Strep (GBS) es radha bacteria vaginal común. Aproximadamente el 25% de las mujeres normalmente tienen la bacteria GBS en la vagina. NO es radha infección de transmisión sexual. ¡De hecho, no es radha infección! Es solo radha bacteria vaginal normal para muchas mujeres.    Sin embargo, la bacteria GBS puede ser peligrosas para un bebé recién nacido si el bebé está expuesto a mona bacteria particular jeremy el parto y el nacimiento Y desarrolla radha infección de la bacteria. La probabilidad de radha infección de GBS en recién nacidos para radha doroteo que tiene las bacteria GBS en la vagina es cerca de 1% - 2%. Jose si la infeccion produce, un bebé podría ser gravemente enfermo.    Por esta razón, hacemos un cultivo vaginal (Q-Tip de la vagina y el recto) para todas las mujeres embarazadas en el aproximadamente 36 semanas de embarazo. Si el cultivo  demuestra que hay bacteria GBS presente, no es radha razón para el pánico! Porque en esta situación dará lilly antibióticos de la doroteo a través de ricketts IV mientras está en parto. Cuando radha madre se trata con antibióticos jeremy el parto, ricketts bebé REED NUNCA se infecta con la bacteria GBS.

## 2024-06-06 NOTE — PROGRESS NOTES
Adina Carvalho presents today for routine OB visit at 37w3d.  Blood Pressure: 113/73  Al=611 kg (249 lb); Body mass index is 45.54 kg/m².; TWG=13.2 kg (29 lb)  Fetal Heart Rate: 138; Fundal Height (cm): 39 cm  Abdomen: gravid, soft, non-tender.  She reports no complaints.  Reports occasional mild uterine contractions.  Denies vaginal bleeding or leaking of fluid.  Reports adequate fetal movement of at least 10 movements in 2 hours once daily.  She is scheduled for next ultrasound on 6/12/2024  Reviewed labor precautions and fetal kick counts as well as pre-eclampsia warning signs.  Advised to continue medications and return in 1 week.      Current Outpatient Medications   Medication Instructions    albuterol (ProAir HFA) 90 mcg/act inhaler 2 puffs, Inhalation, Every 6 hours PRN    ferrous sulfate 324 mg, Oral, Daily before breakfast    fluticasone (Flovent HFA) 220 mcg/act inhaler 2 puffs, Inhalation, 2 times daily, Rinse mouth after use.    Prenatal Vit-Fe Fumarate-FA (Prenatal Plus Vitamin/Mineral) 27-1 MG TABS 1 tablet, Oral, Daily       Laboratory workup: initial OB & 28 week labs (done 3/5/24); 36 week cultures (done 5/30/24)    Genetic Screening: NIPS negative, MSAFP not done    Vaccinations: influenza (declined 1/10/24); Tdap (given 4/3/24); RSV (not indicated outside RSV season); COVID-19 (declined 1/27/24)    Postpartum contraception: desires OCP's    Fetal Ultrasounds:  12/22/23 (13w4d) EDC confirmed  3/5/24 (24w1d) no previa, EFW=41%, AC=52%, CHIO=15.8cm, kevin WNL w/missed views  4/4/24 (28w3d) EFW=29%, AC=52%, CHIO=12.0cm, kevin WNL w/missed views, breech  5/21/24 (35w1d) CHIO=10.3cm, vertex      G2 Problems (from 12/22/23 to present)       Problem Noted Resolved    GBS+ 6/3/2024 by TAPAN Suarez No    Overview Addendum 6/6/2024  3:22 PM by TAPAN Suarez     Needs intrapartum prophylaxis  Has PCN allergy - see sensitivities         Maternal anemia 4/3/2024 by Althea Norman,  TAPAN No    Overview Addendum 5/2/2024  5:31 PM by TAPAN Suarez     Needs Fe supplement - taking         LSIL pap/+ other HRHPV 2/10/2024 by TAPAN Suarez No    Overview Addendum 4/3/2024  3:40 PM by TAPAN Suarez     Needs colposcopy - done 3/6/24 (impression LSIL)  Needs repeat pap in 1 year         Asthma during pregnancy 1/27/2024 by TAPAN Suarez No    Overview Signed 1/27/2024 11:29 AM by TAPAN Suarez     Albuterol prn  Flovent BID since pre-gestation         Short interval pregnancy 1/27/2024 by TAPAN Suarez No    Overview Signed 1/27/2024 11:30 AM by TAPAN Suarez     Serial growth scans         Hx of PTD @36 weeks 1/27/2024 by TAPAN Suarez No    Overview Addendum 4/3/2024  3:43 PM by TAPAN Suarez     PPROM @36w2d  Needs MFM consultation - done 3/5/24  Serial CL measurements - did not have done         History of c/s x1 1/27/2024 by TAPAN Suarez No    Overview Addendum 6/6/2024  3:24 PM by TAPAN Suarez     8/2023 Op note documents LTV uterine incision  Desires RLTCS @39 weeks - scheduled for 6/17/2024 @1000         Prior IUGR 1/27/2024 by TAPAN Suarez No    Overview Signed 1/27/2024 11:36 AM by TAPAN Suarez     Serial growth scans         Obesity affecting pregnancy 1/27/2024 by TAPAN Suarez No    Overview Addendum 5/16/2024  5:53 PM by TAPAN Suarez     Pre-pregnant BMI=40.23  Needs early GDM screen - done WNL  Serial growth scans  Needs AFS @34 weeks - scheduled w/MFM

## 2024-06-06 NOTE — LETTER
Adina Carvalho  1990      To whom it may concern,   Please excuse Hung Chapin from work on 2024 so he may attend the birth of his child as his girlfriend is scheduled for  section delivery on that date.  Please contact our office with any questions or concerns.    Althea Norman  2024

## 2024-06-07 NOTE — RESULT ENCOUNTER NOTE
GBS positive resistant to Clindamycin. If ROM before section she needs treatment with Vancomycin. Reviewed at 37w prenatal visit.    Frieda Campbell MD  PGY-IV, OB/GYN  6/7/2024, 9:57 AM

## 2024-06-12 ENCOUNTER — ULTRASOUND (OUTPATIENT)
Age: 34
End: 2024-06-12
Payer: COMMERCIAL

## 2024-06-12 VITALS
BODY MASS INDEX: 46.33 KG/M2 | DIASTOLIC BLOOD PRESSURE: 76 MMHG | HEART RATE: 78 BPM | SYSTOLIC BLOOD PRESSURE: 124 MMHG | WEIGHT: 251.8 LBS | HEIGHT: 62 IN

## 2024-06-12 DIAGNOSIS — O09.299 PRIOR PREGNANCY COMPLICATED BY IUGR, ANTEPARTUM: ICD-10-CM

## 2024-06-12 DIAGNOSIS — O36.5930 POOR FETAL GROWTH AFFECTING MANAGEMENT OF MOTHER IN THIRD TRIMESTER, SINGLE OR UNSPECIFIED FETUS: Primary | ICD-10-CM

## 2024-06-12 DIAGNOSIS — O99.213 OBESITY DURING PREGNANCY IN THIRD TRIMESTER: ICD-10-CM

## 2024-06-12 DIAGNOSIS — Z3A.38 38 WEEKS GESTATION OF PREGNANCY: ICD-10-CM

## 2024-06-12 PROBLEM — O36.5990 IUGR (INTRAUTERINE GROWTH RESTRICTION) AFFECTING CARE OF MOTHER: Status: ACTIVE | Noted: 2024-06-12

## 2024-06-12 PROCEDURE — 59025 FETAL NON-STRESS TEST: CPT | Performed by: OBSTETRICS & GYNECOLOGY

## 2024-06-12 PROCEDURE — 76816 OB US FOLLOW-UP PER FETUS: CPT | Performed by: OBSTETRICS & GYNECOLOGY

## 2024-06-12 PROCEDURE — 99213 OFFICE O/P EST LOW 20 MIN: CPT | Performed by: OBSTETRICS & GYNECOLOGY

## 2024-06-12 PROCEDURE — 76820 UMBILICAL ARTERY ECHO: CPT | Performed by: OBSTETRICS & GYNECOLOGY

## 2024-06-12 NOTE — LETTER
2024     TAPAN Suarez  [address] [address]    Patient: Adina Carvalho   YOB: 1990   Date of Visit: 2024       Dear Dr. Norman:    Thank you for referring Adina Carvalho to me for evaluation. Below are my notes for this consultation.    If you have questions, please do not hesitate to call me. I look forward to following your patient along with you.         Sincerely,        Allison Bolanos MD        CC: No Recipients    Allison Bolanos MD  2024  1:35 PM  Sign when Signing Visit  Adina Carvalho has no complaints today.  She reports regular fetal movements and does not report any problems.  She is here today at 38w2d for an ultrasound for fetal growth and NST.  I used the language line  #[id number] to  this patient.  Adina is 5 foot 3    Problem list:  Prior IUGR  Obesity based on a pregravid BMI of 40  History of a prior PPROM at 36 weeks  Original pregnancy  History of a prior  and is set up for repeat  on the  this month.    Ultrasound findings:  The ultrasound today shows a fetus in the 10th percentile with the fetal abdomen in the 8th percentile.  CHIO is normal and umbilical Dopplers are normal and biophysical profile is normal.  NST is reactive.    Pregnancy ultrasound has limitations and is unable to detect all forms of fetal congenital abnormalities.  The inaccuracy in the EFW can be off by 1 lb either way in the third trimester.    Specific counseling was provided on the following problems:  1.  We discussed the findings today of a baby that is in the 10th percentile with the fetal abdomen in the 8th percentile.  This gives a diagnosis of fetal growth restriction.  She has had this in the previous pregnancy so this finding today may also be a sign that she has a small baby secondary to family genetics especially since the amniotic fluid and umbilical Dopplers appear normal.  She is  being delivered in 5 days.  Her fetal testing today was reassuring so I did not find any reason to move up her delivery.    Follow up recommended:   Further follow-up ultrasounds are recommended at this time.  Recommend daily fetal kick counts to delivery.    Pre visit time reviewing her records   10 minutes  Face to face time 10 minutes  Post visit time on documentation of note, updating her problem list, adding orders and prescriptions 10 minutes.  Procedures that were completed today were charged separately.   The level of decision making was moderate complexity.    Allison Bolanos MD

## 2024-06-12 NOTE — PROGRESS NOTES
Adina Carvalho has no complaints today.  She reports regular fetal movements and does not report any problems.  She is here today at 38w2d for an ultrasound for fetal growth and NST.  I used the [employer] line  #[id number] to  this patient.  Adina is 5 foot 3    Problem list:  Prior IUGR  Obesity based on a pregravid BMI of 40  History of a prior PPROM at 36 weeks  Original pregnancy  History of a prior  and is set up for repeat  on the  this month.    Ultrasound findings:  The ultrasound today shows a fetus in the 10th percentile with the fetal abdomen in the 8th percentile.  CHIO is normal and umbilical Dopplers are normal and biophysical profile is normal.  NST is reactive.    Pregnancy ultrasound has limitations and is unable to detect all forms of fetal congenital abnormalities.  The inaccuracy in the EFW can be off by 1 lb either way in the third trimester.    Specific counseling was provided on the following problems:  1.  We discussed the findings today of a baby that is in the 10th percentile with the fetal abdomen in the 8th percentile.  This gives a diagnosis of fetal growth restriction.  She has had this in the previous pregnancy so this finding today may also be a sign that she has a small baby secondary to family genetics especially since the amniotic fluid and umbilical Dopplers appear normal.  She is being delivered in 5 days.  Her fetal testing today was reassuring so I did not find any reason to move up her delivery.    Follow up recommended:   Further follow-up ultrasounds are recommended at this time.  Recommend daily fetal kick counts to delivery.    Pre visit time reviewing her records   10 minutes  Face to face time 10 minutes  Post visit time on documentation of note, updating her problem list, adding orders and prescriptions 10 minutes.  Procedures that were completed today were charged separately.   The level of decision making was moderate  complexity.    Allison Bolanos MD

## 2024-06-12 NOTE — PROGRESS NOTES
Repeat Non-Stress Testing:    Patient verbalizes +FM. Pt denies ALL:               Leaking of fluid   Contractions   Vaginal bleeding   Decreased fetal movement    Patient is performing daily kick counts. Patient has no questions or concerns.   NST strip reviewed by Dr. Bolanos.

## 2024-06-13 ENCOUNTER — ROUTINE PRENATAL (OUTPATIENT)
Dept: OBGYN CLINIC | Facility: CLINIC | Age: 34
End: 2024-06-13

## 2024-06-13 VITALS
SYSTOLIC BLOOD PRESSURE: 120 MMHG | WEIGHT: 252 LBS | HEART RATE: 82 BPM | BODY MASS INDEX: 46.09 KG/M2 | DIASTOLIC BLOOD PRESSURE: 76 MMHG

## 2024-06-13 DIAGNOSIS — Z3A.38 38 WEEKS GESTATION OF PREGNANCY: ICD-10-CM

## 2024-06-13 DIAGNOSIS — Z34.93 PRENATAL CARE IN THIRD TRIMESTER: Primary | ICD-10-CM

## 2024-06-13 PROCEDURE — 99213 OFFICE O/P EST LOW 20 MIN: CPT | Performed by: NURSE PRACTITIONER

## 2024-06-13 NOTE — PROGRESS NOTES
Adina Carvalho presents today for routine OB visit at 38w3d.  Blood Pressure: 120/76  Tl=058 kg (252 lb); Body mass index is 46.09 kg/m².; TWG=14.5 kg (32 lb)  Fetal Heart Rate: 124  SVE today: Cervical Dilation: Fingertip /Cervical Effacement: 50 /Fetal Station: -3  Abdomen: gravid, soft, non-tender.  She reports pelvic pressure.  Reports frequent irregular uterine contractions.  Denies vaginal bleeding or leaking of fluid.  Reports adequate fetal movement of at least 10 movements in 2 hours once daily.  Reviewed labor precautions and fetal kick counts as well as pre-eclampsia warning signs.  Advised to continue medications and return 1 week after c/section for incision check.  She is scheduled for c/section on 6/17/24 @1000.  Given pre-operative showering instructions and Hibiclens.      Current Outpatient Medications   Medication Instructions    albuterol (ProAir HFA) 90 mcg/act inhaler 2 puffs, Inhalation, Every 6 hours PRN    ferrous sulfate 324 mg, Oral, Daily before breakfast    fluticasone (Flovent HFA) 220 mcg/act inhaler 2 puffs, Inhalation, 2 times daily, Rinse mouth after use.    Prenatal Vit-Fe Fumarate-FA (Prenatal Plus Vitamin/Mineral) 27-1 MG TABS 1 tablet, Oral, Daily       Laboratory workup: initial OB & 28 week labs (done 3/5/24); 36 week cultures (done 5/30/24)    Genetic Screening: NIPS negative, MSAFP not done    Vaccinations: influenza (declined 1/10/24); Tdap (given 4/3/24); RSV (not indicated outside RSV season); COVID-19 (declined 1/27/24)    Postpartum contraception: desires OCP's    Fetal Ultrasounds:  12/22/23 (13w4d) EDC confirmed  3/5/24 (24w1d) no previa, EFW=41%, AC=52%, CHIO=15.8cm, kevin WNL w/missed views  4/4/24 (28w3d) EFW=29%, AC=52%, CHIO=12.0cm, kevin WNL w/missed views, breech  5/21/24 (35w1d) CHIO=10.3cm, vertex  6/12/24 (38w2d) EFW=10%, AC=8%, CHIO=8.4cm, UA dopps WNL, kevin WNL w/missed views, vertex      G2 Problems (from 12/22/23 to present)       Problem Noted Resolved     IUGR 6/12/2024 by Allison Bolanos MD No    Overview Addendum 6/13/2024  6:07 PM by TAPAN Suarez     Noted @38w2d  Needs delivery by 39 weeks - scheduled for c/s on 6/17/2024 @1000         GBS+ 6/3/2024 by TAPAN Suarez No    Overview Addendum 6/6/2024  3:22 PM by TAPAN Suarez     Needs intrapartum prophylaxis  Has PCN allergy - see sensitivities         Maternal anemia 4/3/2024 by TAPAN Suarez No    Overview Addendum 5/2/2024  5:31 PM by TAPAN Suarez     Needs Fe supplement - taking         LSIL pap/+ other HRHPV 2/10/2024 by TAPAN Suarez No    Overview Addendum 4/3/2024  3:40 PM by TAPAN Suarez     Needs colposcopy - done 3/6/24 (impression LSIL)  Needs repeat pap in 1 year         Asthma during pregnancy 1/27/2024 by TAPAN Suarez No    Overview Signed 1/27/2024 11:29 AM by TAPAN Suarez     Albuterol prn  Flovent BID since pre-gestation         Short interval pregnancy 1/27/2024 by TAPAN Suarez No    Overview Signed 1/27/2024 11:30 AM by TAPAN Suarez     Serial growth scans         Hx of PTD @36 weeks 1/27/2024 by TAPAN Suarez No    Overview Addendum 4/3/2024  3:43 PM by TAPAN Suarez     PPROM @36w2d  Needs MFM consultation - done 3/5/24  Serial CL measurements - did not have done         History of c/s x1 1/27/2024 by TAPAN Suarez No    Overview Addendum 6/6/2024  3:24 PM by TAPAN Suarez     8/2023 Op note documents LTV uterine incision  Desires RLTCS @39 weeks - scheduled for 6/17/2024 @1000         Prior IUGR 1/27/2024 by TAPAN Suarez No    Overview Signed 1/27/2024 11:36 AM by TAPAN Suarez     Serial growth scans         Obesity affecting pregnancy 1/27/2024 by TAPAN Suarez No    Overview Addendum 5/16/2024  5:53 PM by TAPAN Suarez     Pre-pregnant BMI=40.23  Needs early GDM screen  - done WNL  Serial growth scans  Needs AFS @34 weeks - scheduled w/MFM

## 2024-06-13 NOTE — PATIENT INSTRUCTIONS
Jessica por ricketts confianza en nuestro equipo.   Le agradecemos y agradecemos leoncio comentarios.   Si recibe radha encuesta nuestra, tómese unos momentos para informarnos cómo estamos.   Sinceramente,  TAPAN Suarez       CARLOTA DE PARTO   Llame a nuestra oficina al [phone] para cualquiera de los siguientes:    * Necesito llamar inmediatamente yo si tengo incluso radha pequeña cantidad de LIQUIDO se escapa de mi vagina, con o sin contracciones.   * Autumn llamar si tengo SANGRADO radha cantidad igual o más que un período. Radha pequeña cantidad de flujo vaginal sangriento es normal al final del embarazo.   * Autumn llamar si tengo CONTRACCIONES cada brandy minutos jeremy al menos radha hora. Necesito un reloj para tiempo. Yo autumn tiempo desde el comienzo de radha contracción hasta el comienzo de la siguiente.   * De ser necesario ANTES DE  ir al hospital.   * Necesito tener un plan para TRANSPORTE a ir al hospital cuando estoy en trabajo de parto. Trabajo generalmente no es radha emergencia que requiere radha ambulancia.          CUENTAS DEL RETROCESO FETAL    En el tercer trimestre (después de 28 semanas de gestación) deben realizar patada fetal cuentas cada día. Ricketts bebé debe moverse al menos 10 veces en 2 horas jeremy un tiempo activo, radha vez al día.    Elegir el atime del día cuando el bebé es más activo. Trate de hacerlo a la misma hora cada día. Entrar en radha posición cómoda y luego escribir el tiempo que tu bebé se mueve blaise. Cuenta cada movimiento hasta que el bebé se mueve 10 veces. Estos movimientos incluyen patadas, puñetazos, codazos, revolotea o rollos. [place] puede tardar entre 5 minutos a 2 horas. Anote el tiempo que sientes movimiento 10 del bebé.    Si ha pasado 2 horas y tu bebé no se ha movido al menos 10 veces, syl debe llamar a la oficina de inmediato. [phone]      PREOPERATORIA QUE MUESTRA LAS INSTRUCCTIONES    Antes de ricketts operación, syl juega un papel importante en disminuir el riesgo de  infección. Lavarse berna ricketts cuerpo para reducir las bacterias en la piel puede ayudar a prevenir infecciones en el sitio quirúrgico.    Por favor sumit las siguientes la semana antes de ricketts operación para que estés listo!    Semana antes de la cirugía  Si ricketts cirujano se indica utilizar un jabón antiséptico que contiene gluconato de clorhexidina (CHG) antes de la cirugía, usted debe obtener lilly jabón por lo menos 1 semana antes de ricketts operación. Las marcas comunes incluyen:  · Aplicare (R)  · Soportar  · Hibiclens (R)    Jabón CHG está disponible en las oficinas de algún médico, [facility] farmacia de [place] o mayoría de las farmacias por angeli. Si usted es alérgico o sensible a la CHG, comunique ricketts médico para que otros jabón antiséptico puede ser sugerida.    Si usted no puede comprar para luego conteniendo CHG, usar jabón normal olimpia se indica a continuación.    Día antes de la cirugía  Usted necesitará ducha la noche antes y la mañana de la cirugía. Usted tendrá que preparar ricketts cama y la ropa para que estén limpios y listos después de ricketts ducha.  Poner sábanas limpias (las hojas) en ricketts cama; deben dormir en sábanas limpias después de la ducha de noche.  Prepárate los paños y toallas limpias, usted necesitará suficiente para 2 duchas.  Dejar de lado limpiarla ropa interior, pijamas y ropa para usar después de la ducha.     Noche antes de la cirugía    La noche antes de ricketts operación, ayala ricketts primera ducha (ducha 1).    Ducha 1:  En primer lugar, lavarse el ricardo con champú regular y enjuáguela compeltely antes de tiffany ricketts cuerpo.  A continuación, lave ricketts cuerpo de davin a los pies con jabón y un paño limpio.  Si fueron mandados por ricketts cirujano fue con jabón CHG:  Utilice 1/2 de la botella de jabón para esta ducha (se utiliza la 1/2 de la botella para ricketts ducha por la mañana).  Hacer no conseguir cambio en tus ojos, oídos, nariz, boca o área vaginal.  Si usas jabón normal, trate de usar radha nueva pravin si es  posible.  Preste especial atención a la radha donde estará la incisión; espuma esta área con el jabón de la CHG jeremy unos 2 minutos.   NO UTILIZAR cualquier otro enjuague jabón o cuerpo en ricketts piel jeremy o después el jabón antiséptico.  Se enjuague completamente con agua corriente.  Use radha toalla limpia para secarlo.  Use ropa interior limpia y ropa/pijamas.  Dormir en sábanas/ropa de cama limpia.    Recordatorios:  · No use loción, polvo, desodorante o perfume/loción para después de cualquier tipo en la piel después de la ducha antiséptica.  · No afeitar ninguna parte del cuerpo en las 24 horas/día antes de ricketts operación.    Día de la cirugía  La mañana de ricketts operación, tome la segunda ducha (ducha 2).    Ducha 2:  Siga los mismos pasos olimpia ducha 1.  Si fueron mandados por ricketts cirujano para tiffany con el jabón CHG, use la segunda 1/2 de la botella de jabón CHG.

## 2024-06-16 NOTE — ASSESSMENT & PLAN NOTE
Desires delivery via RLTCS  Admit to OBGYN  CBC, RPR, Blood Type & Screen  3.  Anesthesia consult for spinal  4.  Clindamycin and gentamicin for surgical prophylaxis given anaphylaxis to penicillin  5.  NPO   6.  To OR for  delivery  7. Desires OCP at 6w for contraception postpartum

## 2024-06-16 NOTE — H&P
H & P- Obstetrics   Adina Carvalho 34 y.o. adult MRN: [id number]  Unit/Bed#: LD TRIAGE - Encounter: [id number]      History and physical conducted with [place] [employer] [employer]  [id number]    Assessment/Plan:    Adina is a 34 y.o.  at 39w0d admitted for repeat  delivery.    History of c/s x1  Assessment & Plan  Desires delivery via RLTCS  Admit to OBGYN  CBC, RPR, Blood Type & Screen  3.  Anesthesia consult for spinal  4.  Clindamycin and gentamicin for surgical prophylaxis given anaphylaxis to penicillin  5.  NPO   6.  To OR for  delivery  7. Desires OCP at 6w for contraception postpartum      IUGR  Assessment & Plan  Recommended delivery at 39w gestation    Maternal anemia  Assessment & Plan  Follow up admission hemoglobin    Obesity affecting pregnancy  Assessment & Plan  DVT prophylaxis with Lovenox 40 mg BID postoperatively    Asthma during pregnancy  Assessment & Plan  Continue Flovent daily and albuterol as needed  Avoid hemabate         Patient of: [facility]  This patient will be an INPATIENT  and I certify the anticipated length of stay is >2 Midnights  Discussed with Dr. Lawler      SUBJECTIVE:    Chief Complaint: I am here for my     HPI: Adina Carvalho is a 34 y.o.  with an SHAHRIAR of 2024, by Ultrasound at 38w6d who is being admitted for repeat  delivery. She denies having uterine contractions, has no LOF, and reports no VB. She states she has felt good FM. This pregnancy is complicated by asthma, IUGR, anemia, history of prior C/S delivery. All other review of systems is negative.       Pregnancy Plan:  Pregnancy: Mcdaniels  Fetal sex: Male     Delivery Plans  Deliver by GA (weeks): 40  Planned delivery method:   Planned delivery location: [facility]  Planned anesthesia: Epidural  Acceptable blood products: All     Post-Delivery Plans  Feeding intentions: Non-human milk substitute  Circumcision  requested: Provider Performed  Planned birth control: OCP      Patient Active Problem List   Diagnosis    38 weeks gestation of pregnancy    Asthma during pregnancy    Short interval pregnancy    Hx of PTD @36 weeks    History of c/s x1    Prior IUGR    Obesity affecting pregnancy    LSIL pap/+ other HRHPV    Maternal anemia    GBS+    IUGR       OB History    Para Term  AB Living   2 1 0 1 0 1   SAB IAB Ectopic Multiple Live Births   0 0 0 0 1      # Outcome Date GA Lbr Van/2nd Weight Sex Type Anes PTL Lv   2 Current            1  23 36w3d  2070 g (4 lb 9 oz) M CS-LTranv EPI Y PEPPER      Complications: Fetal Intolerance       Past Medical History:   Diagnosis Date    Abnormal Pap smear of cervix     2023 NILM pap/+ other HRHPV; 2024 LSIL pap/+ other HRHPV    Asthma        Past Surgical History:   Procedure Laterality Date    AK  DELIVERY ONLY N/A 2023    Procedure:  SECTION ();  Surgeon: Rabia Falcon MD;  Location: [facility];  Service: Obstetrics       Social History     Tobacco Use    Smoking status: Never    Smokeless tobacco: Never   Substance Use Topics    Alcohol use: Not Currently     Comment: couple times/year, none since pregnant       Allergies   Allergen Reactions    Penicillins Anaphylaxis         Medications Prior to Admission:     albuterol (ProAir HFA) 90 mcg/act inhaler    ferrous sulfate 324 (65 Fe) mg    fluticasone (Flovent HFA) 220 mcg/act inhaler    Prenatal Vit-Fe Fumarate-FA (Prenatal Plus Vitamin/Mineral) 27-1 MG TABS        OBJECTIVE:  Vitals:  Temp:  [97 °F (36.1 °C)] 97 °F (36.1 °C)  HR:  [75] 75  Resp:  [18] 18  BP: (104)/(59) 104/59  Body mass index is 46.09 kg/m².     Physical Exam:  General: Well appearing, no distress  Respiratory: Unlabored breathing, lungs clear to auscultation  Cardiovascular: Regular rate and rhythm  Abdomen: Soft, gravid, nontender  Fundal Height: Appropriate for gestational age.  Extremities: Warm and  "well perfused.  Non tender.  Psychiatric: Behavioral normal        FHT:  Baseline 125, moderate variability, 15x15 accelerations present, absent decelerations    TOCO:   No contractions      Prenatal Labs:   Blood Type:   Lab Results   Component Value Date/Time    ABO Grouping A 03/05/2024 10:38 AM   D (Rh type):   Lab Results   Component Value Date/Time    Rh Factor Positive 03/05/2024 10:38 AM   Antibody Screen: Negative   HCT/HGB:   Lab Results   Component Value Date/Time    Hematocrit 32.7 (L) 06/17/2024 08:31 AM    Hemoglobin 11.0 (L) 06/17/2024 08:31 AM   MCV:   Lab Results   Component Value Date/Time    MCV 93 06/17/2024 08:31 AM   Platelets:   Lab Results   Component Value Date/Time    Platelets 238 06/17/2024 08:31 AM   1 hour Glucola:   Lab Results   Component Value Date/Time    Glucose 102 03/05/2024 10:38 AM   Rubella:   Lab Results   Component Value Date/Time    Rubella IgG Quant 46.5 03/05/2024 10:38 AM    VDRL/RPR: Non reactive   Urine Culture/Screen:   Lab Results   Component Value Date/Time    Urine Culture 20,000-29,000 cfu/ml 04/19/2024 10:57 AM   Hep B:   Lab Results   Component Value Date/Time    Hepatitis B Surface Ag Non-reactive 03/05/2024 10:38 AM   Hep C: Non reactive  HIV: Non reactive  Chlamydia: Negative  Gonorrhea:   Lab Results   Component Value Date/Time    N gonorrhoeae, DNA Probe Negative 01/27/2024 12:26 PM   Group B Strep:    Lab Results   Component Value Date/Time    Strep Grp B PCR Positive (A) 05/30/2024 05:17 PM    Strep Grp B PCR Positive for Beta Hemolytic Strep Grp B by PCR (A) 05/30/2024 05:17 PM    Strep Grp B PCR Streptococcus agalactiae (Group B) (A) 05/30/2024 05:17 PM            Darya Gunn MD  6/17/2024  8:51 AM        Portions of the record may have been created with voice recognition software.  Occasional wrong word or \"sound a like\" substitutions may have occurred due to the inherent limitations of voice recognition software.  Read the chart carefully " and recognize, using context, where substitutions have occurred

## 2024-06-17 ENCOUNTER — ANESTHESIA EVENT (INPATIENT)
Dept: LABOR AND DELIVERY | Facility: HOSPITAL | Age: 34
DRG: 540 | End: 2024-06-17
Payer: COMMERCIAL

## 2024-06-17 ENCOUNTER — HOSPITAL ENCOUNTER (INPATIENT)
Facility: HOSPITAL | Age: 34
LOS: 3 days | Discharge: HOME | DRG: 540 | End: 2024-06-20
Attending: OBSTETRICS & GYNECOLOGY | Admitting: OBSTETRICS & GYNECOLOGY
Payer: COMMERCIAL

## 2024-06-17 DIAGNOSIS — Z78.9 NEEDS ASSISTANCE WITH COMMUNITY RESOURCES: ICD-10-CM

## 2024-06-17 DIAGNOSIS — O34.219 HISTORY OF CESAREAN DELIVERY, ANTEPARTUM: Primary | ICD-10-CM

## 2024-06-17 DIAGNOSIS — Z98.891 S/P REPEAT LOW TRANSVERSE C-SECTION: ICD-10-CM

## 2024-06-17 LAB
ABO GROUP BLD: NORMAL
BASE EXCESS BLDCOA CALC-SCNC: -5.3 MMOL/L (ref 3–11)
BASE EXCESS BLDCOV CALC-SCNC: -2.1 MMOL/L (ref 1–9)
BLD GP AB SCN SERPL QL: NEGATIVE
ERYTHROCYTE [DISTWIDTH] IN BLOOD BY AUTOMATED COUNT: 13.3 % (ref 11.6–15.1)
HCO3 BLDCOA-SCNC: 23.1 MMOL/L (ref 17.3–27.3)
HCO3 BLDCOV-SCNC: 24.8 MMOL/L (ref 12.2–28.6)
HCT VFR BLD AUTO: 32.7 % (ref 36.5–46.1)
HGB BLD-MCNC: 11 G/DL (ref 12–15.4)
HOLD SPECIMEN: YES
MCH RBC QN AUTO: 31.3 PG (ref 26.8–34.3)
MCHC RBC AUTO-ENTMCNC: 33.6 G/DL (ref 31.4–37.4)
MCV RBC AUTO: 93 FL (ref 82–98)
O2 CT VFR BLDCOA CALC: 4.4 ML/DL
OXYHGB MFR BLDCOA: 21.3 %
OXYHGB MFR BLDCOV: 58.7 %
PCO2 BLDCOA: 56.9 MM[HG] (ref 30–60)
PCO2 BLDCOV: 50.3 MM HG (ref 27–43)
PH BLDCOA: 7.23 [PH] (ref 7.23–7.43)
PH BLDCOV: 7.31 [PH] (ref 7.19–7.49)
PLATELET # BLD AUTO: 238 THOUSANDS/UL (ref 149–390)
PMV BLD AUTO: 10.2 FL (ref 8.9–12.7)
PO2 BLDCOA: 13.4 MM HG (ref 5–25)
PO2 BLDCOV: 25.1 MM HG (ref 15–45)
RBC # BLD AUTO: 3.52 MILLION/UL (ref 3.88–5.12)
RH BLD: POSITIVE
SAO2 % BLDCOV: 12.6 ML/DL
SPECIMEN EXPIRATION DATE: NORMAL
TREPONEMA PALLIDUM IGG+IGM AB [PRESENCE] IN SERUM OR PLASMA BY IMMUNOASSAY: NORMAL
WBC # BLD AUTO: 5.51 THOUSAND/UL (ref 4.31–10.16)

## 2024-06-17 PROCEDURE — 86900 BLOOD TYPING SEROLOGIC ABO: CPT

## 2024-06-17 PROCEDURE — 59514 CESAREAN DELIVERY ONLY: CPT | Performed by: OBSTETRICS & GYNECOLOGY

## 2024-06-17 PROCEDURE — 86780 TREPONEMA PALLIDUM: CPT

## 2024-06-17 PROCEDURE — NC001 PR NO CHARGE: Performed by: OBSTETRICS & GYNECOLOGY

## 2024-06-17 PROCEDURE — 86901 BLOOD TYPING SEROLOGIC RH(D): CPT

## 2024-06-17 PROCEDURE — 85027 COMPLETE CBC AUTOMATED: CPT

## 2024-06-17 PROCEDURE — 82805 BLOOD GASES W/O2 SATURATION: CPT | Performed by: OBSTETRICS & GYNECOLOGY

## 2024-06-17 PROCEDURE — 86850 RBC ANTIBODY SCREEN: CPT

## 2024-06-17 RX ORDER — FENTANYL CITRATE/PF 50 MCG/ML
50 SYRINGE (ML) INJECTION
Status: DISCONTINUED | OUTPATIENT
Start: 2024-06-17 | End: 2024-06-20 | Stop reason: HOSPADM

## 2024-06-17 RX ORDER — ENOXAPARIN SODIUM 100 MG/ML
40 INJECTION SUBCUTANEOUS EVERY 12 HOURS
Status: DISCONTINUED | OUTPATIENT
Start: 2024-06-18 | End: 2024-06-20 | Stop reason: HOSPADM

## 2024-06-17 RX ORDER — BUPIVACAINE HYDROCHLORIDE AND EPINEPHRINE 2.5; 5 MG/ML; UG/ML
INJECTION, SOLUTION EPIDURAL; INFILTRATION; INTRACAUDAL; PERINEURAL AS NEEDED
Status: DISCONTINUED | OUTPATIENT
Start: 2024-06-17 | End: 2024-06-17

## 2024-06-17 RX ORDER — SODIUM CHLORIDE, SODIUM LACTATE, POTASSIUM CHLORIDE, CALCIUM CHLORIDE 600; 310; 30; 20 MG/100ML; MG/100ML; MG/100ML; MG/100ML
125 INJECTION, SOLUTION INTRAVENOUS CONTINUOUS
Status: DISCONTINUED | OUTPATIENT
Start: 2024-06-17 | End: 2024-06-20 | Stop reason: HOSPADM

## 2024-06-17 RX ORDER — SIMETHICONE 80 MG
80 TABLET,CHEWABLE ORAL 4 TIMES DAILY PRN
Status: DISCONTINUED | OUTPATIENT
Start: 2024-06-17 | End: 2024-06-20 | Stop reason: HOSPADM

## 2024-06-17 RX ORDER — ONDANSETRON 2 MG/ML
4 INJECTION INTRAMUSCULAR; INTRAVENOUS EVERY 8 HOURS PRN
Status: DISCONTINUED | OUTPATIENT
Start: 2024-06-17 | End: 2024-06-20 | Stop reason: HOSPADM

## 2024-06-17 RX ORDER — IBUPROFEN 600 MG/1
600 TABLET ORAL EVERY 6 HOURS
Status: DISCONTINUED | OUTPATIENT
Start: 2024-06-18 | End: 2024-06-19

## 2024-06-17 RX ORDER — IBUPROFEN 600 MG/1
600 TABLET ORAL EVERY 6 HOURS
Status: DISCONTINUED | OUTPATIENT
Start: 2024-06-18 | End: 2024-06-17

## 2024-06-17 RX ORDER — EPHEDRINE SULFATE 50 MG/ML
INJECTION INTRAVENOUS
Status: DISPENSED
Start: 2024-06-17 | End: 2024-06-17

## 2024-06-17 RX ORDER — BENZOCAINE/MENTHOL 6 MG-10 MG
1 LOZENGE MUCOUS MEMBRANE DAILY PRN
Status: DISCONTINUED | OUTPATIENT
Start: 2024-06-17 | End: 2024-06-20 | Stop reason: HOSPADM

## 2024-06-17 RX ORDER — OXYCODONE HYDROCHLORIDE 5 MG/1
10 TABLET ORAL EVERY 4 HOURS PRN
Status: DISCONTINUED | OUTPATIENT
Start: 2024-06-17 | End: 2024-06-17

## 2024-06-17 RX ORDER — FENTANYL CITRATE 50 UG/ML
INJECTION, SOLUTION INTRAMUSCULAR; INTRAVENOUS
Status: COMPLETED
Start: 2024-06-17 | End: 2024-06-17

## 2024-06-17 RX ORDER — ONDANSETRON 2 MG/ML
4 INJECTION INTRAMUSCULAR; INTRAVENOUS ONCE AS NEEDED
Status: DISCONTINUED | OUTPATIENT
Start: 2024-06-17 | End: 2024-06-20

## 2024-06-17 RX ORDER — ONDANSETRON 2 MG/ML
INJECTION INTRAMUSCULAR; INTRAVENOUS AS NEEDED
Status: DISCONTINUED | OUTPATIENT
Start: 2024-06-17 | End: 2024-06-17

## 2024-06-17 RX ORDER — HYDROXYZINE HYDROCHLORIDE 25 MG/1
25 TABLET, FILM COATED ORAL 2 TIMES DAILY PRN
Status: DISCONTINUED | OUTPATIENT
Start: 2024-06-17 | End: 2024-06-17

## 2024-06-17 RX ORDER — KETOROLAC TROMETHAMINE 30 MG/ML
INJECTION, SOLUTION INTRAMUSCULAR; INTRAVENOUS AS NEEDED
Status: DISCONTINUED | OUTPATIENT
Start: 2024-06-17 | End: 2024-06-17

## 2024-06-17 RX ORDER — CALCIUM CARBONATE 500 MG/1
1000 TABLET, CHEWABLE ORAL DAILY PRN
Status: DISCONTINUED | OUTPATIENT
Start: 2024-06-17 | End: 2024-06-20 | Stop reason: HOSPADM

## 2024-06-17 RX ORDER — FENTANYL CITRATE 50 UG/ML
INJECTION, SOLUTION INTRAMUSCULAR; INTRAVENOUS AS NEEDED
Status: DISCONTINUED | OUTPATIENT
Start: 2024-06-17 | End: 2024-06-17

## 2024-06-17 RX ORDER — MORPHINE SULFATE 0.5 MG/ML
INJECTION, SOLUTION EPIDURAL; INTRATHECAL; INTRAVENOUS
Status: COMPLETED
Start: 2024-06-17 | End: 2024-06-17

## 2024-06-17 RX ORDER — DOCUSATE SODIUM 100 MG/1
100 CAPSULE, LIQUID FILLED ORAL 2 TIMES DAILY
Status: DISCONTINUED | OUTPATIENT
Start: 2024-06-17 | End: 2024-06-19

## 2024-06-17 RX ORDER — HYDROMORPHONE HCL/PF 1 MG/ML
0.5 SYRINGE (ML) INJECTION
Status: DISCONTINUED | OUTPATIENT
Start: 2024-06-17 | End: 2024-06-20 | Stop reason: HOSPADM

## 2024-06-17 RX ORDER — ALBUTEROL SULFATE 90 UG/1
2 AEROSOL, METERED RESPIRATORY (INHALATION) EVERY 6 HOURS PRN
Status: DISCONTINUED | OUTPATIENT
Start: 2024-06-17 | End: 2024-06-20 | Stop reason: HOSPADM

## 2024-06-17 RX ORDER — OXYTOCIN/RINGER'S LACTATE 30/500 ML
PLASTIC BAG, INJECTION (ML) INTRAVENOUS
Status: COMPLETED
Start: 2024-06-17 | End: 2024-06-17

## 2024-06-17 RX ORDER — OXYCODONE HYDROCHLORIDE 5 MG/1
10 TABLET ORAL EVERY 4 HOURS PRN
Status: DISCONTINUED | OUTPATIENT
Start: 2024-06-17 | End: 2024-06-19

## 2024-06-17 RX ORDER — ENOXAPARIN SODIUM 100 MG/ML
40 INJECTION SUBCUTANEOUS EVERY 12 HOURS SCHEDULED
Status: DISCONTINUED | OUTPATIENT
Start: 2024-06-18 | End: 2024-06-17

## 2024-06-17 RX ORDER — SODIUM CHLORIDE, SODIUM LACTATE, POTASSIUM CHLORIDE, CALCIUM CHLORIDE 600; 310; 30; 20 MG/100ML; MG/100ML; MG/100ML; MG/100ML
125 INJECTION, SOLUTION INTRAVENOUS CONTINUOUS
Status: DISCONTINUED | OUTPATIENT
Start: 2024-06-17 | End: 2024-06-17

## 2024-06-17 RX ORDER — KETOROLAC TROMETHAMINE 30 MG/ML
30 INJECTION, SOLUTION INTRAMUSCULAR; INTRAVENOUS EVERY 6 HOURS
Status: DISPENSED | OUTPATIENT
Start: 2024-06-17 | End: 2024-06-18

## 2024-06-17 RX ORDER — MORPHINE SULFATE 0.5 MG/ML
INJECTION, SOLUTION EPIDURAL; INTRATHECAL; INTRAVENOUS AS NEEDED
Status: DISCONTINUED | OUTPATIENT
Start: 2024-06-17 | End: 2024-06-17

## 2024-06-17 RX ORDER — SENNOSIDES 8.6 MG
1 TABLET ORAL
Status: DISCONTINUED | OUTPATIENT
Start: 2024-06-17 | End: 2024-06-20 | Stop reason: HOSPADM

## 2024-06-17 RX ORDER — OXYTOCIN/RINGER'S LACTATE 30/500 ML
62.5 PLASTIC BAG, INJECTION (ML) INTRAVENOUS ONCE
Status: DISCONTINUED | OUTPATIENT
Start: 2024-06-17 | End: 2024-06-20 | Stop reason: HOSPADM

## 2024-06-17 RX ORDER — ACETAMINOPHEN 325 MG/1
650 TABLET ORAL EVERY 6 HOURS SCHEDULED
Status: COMPLETED | OUTPATIENT
Start: 2024-06-17 | End: 2024-06-20

## 2024-06-17 RX ORDER — ACETAMINOPHEN 325 MG/1
650 TABLET ORAL EVERY 6 HOURS SCHEDULED
Status: DISCONTINUED | OUTPATIENT
Start: 2024-06-17 | End: 2024-06-17

## 2024-06-17 RX ORDER — ONDANSETRON 2 MG/ML
4 INJECTION INTRAMUSCULAR; INTRAVENOUS EVERY 8 HOURS PRN
Status: DISCONTINUED | OUTPATIENT
Start: 2024-06-17 | End: 2024-06-17

## 2024-06-17 RX ORDER — EPHEDRINE SULFATE 50 MG/ML
INJECTION INTRAVENOUS AS NEEDED
Status: DISCONTINUED | OUTPATIENT
Start: 2024-06-17 | End: 2024-06-17

## 2024-06-17 RX ORDER — OXYCODONE HYDROCHLORIDE 5 MG/1
5 TABLET ORAL EVERY 4 HOURS PRN
Status: DISCONTINUED | OUTPATIENT
Start: 2024-06-17 | End: 2024-06-19

## 2024-06-17 RX ORDER — NALOXONE HYDROCHLORIDE 0.4 MG/ML
0.1 INJECTION, SOLUTION INTRAMUSCULAR; INTRAVENOUS; SUBCUTANEOUS
Status: ACTIVE | OUTPATIENT
Start: 2024-06-17 | End: 2024-06-18

## 2024-06-17 RX ORDER — KETOROLAC TROMETHAMINE 30 MG/ML
INJECTION, SOLUTION INTRAMUSCULAR; INTRAVENOUS
Status: COMPLETED
Start: 2024-06-17 | End: 2024-06-18

## 2024-06-17 RX ORDER — ONDANSETRON 2 MG/ML
INJECTION INTRAMUSCULAR; INTRAVENOUS
Status: COMPLETED
Start: 2024-06-17 | End: 2024-06-17

## 2024-06-17 RX ORDER — PHENYLEPHRINE HCL IN 0.9% NACL 1 MG/10 ML
SYRINGE (ML) INTRAVENOUS
Status: DISPENSED
Start: 2024-06-17 | End: 2024-06-17

## 2024-06-17 RX ORDER — OXYTOCIN/RINGER'S LACTATE 30/500 ML
PLASTIC BAG, INJECTION (ML) INTRAVENOUS CONTINUOUS PRN
Status: DISCONTINUED | OUTPATIENT
Start: 2024-06-17 | End: 2024-06-17

## 2024-06-17 RX ORDER — CLINDAMYCIN PHOSPHATE 900 MG/50ML
900 INJECTION, SOLUTION INTRAVENOUS ONCE
Status: COMPLETED | OUTPATIENT
Start: 2024-06-17 | End: 2024-06-17

## 2024-06-17 RX ADMIN — BUPIVACAINE HYDROCHLORIDE AND EPINEPHRINE BITARTRATE 1.6 ML: 2.5; .005 INJECTION, SOLUTION EPIDURAL; INFILTRATION; INTRACAUDAL; PERINEURAL at 10:29

## 2024-06-17 RX ADMIN — SODIUM CHLORIDE, SODIUM LACTATE, POTASSIUM CHLORIDE, AND CALCIUM CHLORIDE 125 ML/HR: .6; .31; .03; .02 INJECTION, SOLUTION INTRAVENOUS at 08:44

## 2024-06-17 RX ADMIN — ONDANSETRON 4 MG: 2 INJECTION INTRAMUSCULAR; INTRAVENOUS at 10:32

## 2024-06-17 RX ADMIN — Medication 1200 MILLI-UNITS/MIN: at 11:10

## 2024-06-17 RX ADMIN — KETOROLAC TROMETHAMINE 30 MG: 30 INJECTION, SOLUTION INTRAMUSCULAR at 18:13

## 2024-06-17 RX ADMIN — ACETAMINOPHEN 650 MG: 325 TABLET, FILM COATED ORAL at 14:53

## 2024-06-17 RX ADMIN — EPHEDRINE SULFATE 5 MG: 50 INJECTION, SOLUTION INTRAVENOUS at 10:34

## 2024-06-17 RX ADMIN — Medication 250 MILLI-UNITS/MIN: at 11:31

## 2024-06-17 RX ADMIN — MORPHINE SULFATE 0.15 MG: 0.5 INJECTION, SOLUTION EPIDURAL; INTRATHECAL; INTRAVENOUS at 10:29

## 2024-06-17 RX ADMIN — SODIUM CHLORIDE, SODIUM LACTATE, POTASSIUM CHLORIDE, AND CALCIUM CHLORIDE: .6; .31; .03; .02 INJECTION, SOLUTION INTRAVENOUS at 11:51

## 2024-06-17 RX ADMIN — ACETAMINOPHEN 650 MG: 325 TABLET, FILM COATED ORAL at 22:18

## 2024-06-17 RX ADMIN — SODIUM CHLORIDE, SODIUM LACTATE, POTASSIUM CHLORIDE, AND CALCIUM CHLORIDE 125 ML/HR: .6; .31; .03; .02 INJECTION, SOLUTION INTRAVENOUS at 19:13

## 2024-06-17 RX ADMIN — KETOROLAC TROMETHAMINE 30 MG: 30 INJECTION, SOLUTION INTRAMUSCULAR; INTRAVENOUS at 11:53

## 2024-06-17 RX ADMIN — DOCUSATE SODIUM 100 MG: 100 CAPSULE, LIQUID FILLED ORAL at 18:13

## 2024-06-17 RX ADMIN — FENTANYL CITRATE 15 MCG: 50 INJECTION INTRAMUSCULAR; INTRAVENOUS at 10:29

## 2024-06-17 RX ADMIN — CLINDAMYCIN IN 5 PERCENT DEXTROSE 900 MG: 18 INJECTION, SOLUTION INTRAVENOUS at 09:48

## 2024-06-17 RX ADMIN — GENTAMICIN SULFATE 250 MG: 40 INJECTION, SOLUTION INTRAMUSCULAR; INTRAVENOUS at 10:30

## 2024-06-17 RX ADMIN — EPHEDRINE SULFATE 5 MG: 50 INJECTION, SOLUTION INTRAVENOUS at 10:31

## 2024-06-17 RX ADMIN — PHENYLEPHRINE HYDROCHLORIDE 30 MCG/MIN: 10 INJECTION INTRAVENOUS at 10:29

## 2024-06-17 NOTE — DISCHARGE INSTRUCTIONS
Self Care After Delivery   AMBULATORY CARE:   The postpartum period is the period of time from delivery to about 6 weeks. During this time you may experience many physical and emotional changes. It is important to understand what is normal and when you need to call your healthcare provider. It is also important to know how to care for yourself during this time.  Call your local emergency number (911 in the [place]) for any of the following:   You see or hear things that are not there, or have thoughts of harming yourself or your baby.     You soak through 1 pad in 15 minutes, have blurry vision, clammy or pale skin, and feel faint.     You faint or lose consciousness.     You have trouble breathing.     You cough up blood.     Your  incision comes apart.     Seek care immediately if:   Your heart is beating faster than usual.     You have a bad headache or changes in your vision.     Your perineal tear, episiotomy site, or  incision is red, swollen, bleeding, or draining pus.     You have severe abdominal pain.     Call your doctor or obstetrician if:   Your leg is painful, red, and larger than usual.     You soak through 1 or more pads in an hour, or pass blood clots larger than a quarter from your vagina.     You have a fever.     You have new or worsening pain in your abdomen or vagina.     You continue to have depression 1 to 2 weeks after you deliver.     You have trouble sleeping.     You have foul-smelling discharge from your vagina.     You have pain or burning when you urinate.     You do not have a bowel movement for 3 days or more.     You have nausea or are vomiting.     You have hard lumps or red streaks over your breasts.     You have cracked nipples or bleed from your nipples.     You have questions or concerns about your condition or care.     Physical changes: The following are normal changes after you give birth:  Pain in the area between your anus and vagina     Breast pain      Constipation or hemorrhoids     Hot or cold flashes     Vaginal bleeding or discharge     Mild to moderate abdominal cramping     Difficulty controlling bowel movements or urine     Emotional changes: A drop in hormone levels after you deliver may cause changes in your emotions. You may feel irritable, sad, or anxious. You may cry easily or for no reason. You may also feel depressed. Depression that continues can be a sign of postpartum depression, a condition that can be treated. Treatment may include talk therapy, medicines, or both. Healthcare providers will ask how you are feeling and if you have any depression. These talks can happen during appointments for your medical care and for your baby's care, such as well child visits. Providers can help you find ways to care for yourself and your baby. Talk to your providers about the following:  When emotional changes or depression started, and if it is getting worse over time     Problems you are having with daily activities, sleep, or caring for your baby     If anything makes you feel worse, or makes you feel better     Feeling that you are not bonding with your baby the way you want     Any problems your baby has with sleeping or feeding     Your baby is fussy or cries a lot     Support you have from friends, family, or others     Breast care for breastfeeding mothers: You may have sore breasts for 3 to 6 days after you give birth. This happens as your milk begins to fill your breasts. You may also have sore breasts if you do not breastfeed frequently. Do the following to care for your breasts:  Apply a moist, warm, compress to your breast as directed. This may help soothe your breasts. Make sure the washcloth is not too hot before you apply it to your breast.     Nurse your baby or pump your milk frequently. This may prevent clogged milk ducts. Ask your healthcare provider how often to nurse or pump.     Massage your breasts as directed. This may help increase  your milk flow. Gently rub your breasts in a circular motion before you breastfeed. You may need to gently squeeze your breast or nipple to help release milk. You can also use a breast pump to help release milk from your breast.     Wash your breasts with warm water only. Do not put soap on your nipples. Soap may cause your nipples to become dry.     Apply lanolin cream to your nipples as directed. Lanolin cream may add moisture to your skin and prevent nipple dryness. Always wash off lanolin cream with warm water before you breastfeed.     Place pads in your bra. Your nipples may leak milk when you are not breastfeeding. You can place pads inside of your bra to help prevent leaking onto your clothing. Ask your healthcare provider where to purchase bra pads.     Get breastfeeding support if needed. Healthcare providers can answer questions about breastfeeding and provide you with support. Ask your healthcare provider who you can contact if you need breastfeeding support.     Breast care for non-breastfeeding mothers: Milk will fill your breasts even if you bottle feed your baby. Do the following to help stop your milk from filling your breasts and causing pain:  Wear a bra with support at all times. A sports bra or a tight-fitting bra will help stop your milk from coming in.     Apply ice on each breast for 15 to 20 minutes every hour or as directed. Use an ice pack, or put crushed ice in a plastic bag. Cover it with a towel before you apply it to your breast. Ice helps your milk ducts shrink.     Keep your breasts away from warm water. Warm water will make it easier for milk to fill your breasts. Stand with your breasts away from warm water in the shower.     Limit how much you touch your breasts. This will prevent them from filling with milk.     Perineum care: Your perineum is the area between your rectum and vagina. It is normal to have swelling and pain in this area after you give birth. If you had an  episiotomy, your healthcare provider may give you special instructions.  Clean your perineum after you use the bathroom. This may prevent infection and help with healing. Use a spray bottle with warm water to clean your perineum. You may also gently spray warm water against your perineum when you urinate. Always wipe front to back.     Take a sitz bath as directed. A sitz bath may help relieve swelling and pain. Fill your bath tub or bucket with water up to your hips and sit in the water. Use cold water for 2 days after you deliver. Then use warm water. Ask your healthcare provider for more information about a sitz bath.     Apply ice packs for the first 24 hours or as directed. Use a plastic glove filled with ice or buy an ice pack. Wrap the ice pack or plastic glove in a small towel or wash cloth. Place the ice pack on your perineum for 20 minutes at a time.     Sit on a donut-shaped pillow. This may relieve pressure on your perineum when you sit.     Use wipes that contain medicine or take pills as directed. Your healthcare provider may tell you to use witch hazel pads. You can place witch hazel pads in the refrigerator before you apply them to your perineum. Your provider may also tell you to take NSAIDs. Ask him or her how often to take pills or use the wipes.     Do not go swimming or take tub baths for 4 to 6 weeks or as directed. This will help prevent an infection in your vagina or uterus.     Bowel and bladder care: It may take 3 to 5 days to have a bowel movement after you deliver your baby. You can do the following to prevent or manage constipation, and get control of your bowel or bladder:  Take stool softeners as directed. A stool softener is medicine that will make your bowel movements softer. This may prevent or relieve constipation. A stool softener may also make bowel movements less painful.     Drink plenty of liquids. Ask how much liquid to drink each day and which liquids are best for you.  Liquids may help prevent constipation.     Eat foods high in fiber. Examples include fruits, vegetables, grains, beans, and lentils. Ask your healthcare provider how much fiber you need each day. Fiber may prevent constipation.          Do Kegel exercises as directed. Kegel exercises will help strengthen the muscles that control bowel movements and urination. Ask your healthcare provider for more information on Kegel exercises.     Apply cold compresses or medicine to hemorrhoids as directed. This may relieve swelling and pain. Your healthcare provider may tell you to apply ice or wipes that contain medicine to your hemorrhoids. He or she may also tell you to use a sitz bath. Ask your provider for more information on how to manage hemorrhoids.     Nutrition: Good nutrition is important in the postpartum period. It will help you return to a healthy weight, increase your energy levels, and prevent constipation. It will also help you get enough nutrients and calories if you are going to breastfeed your baby.  Eat a variety of healthy foods. Healthy foods include fruits, vegetables, whole-grain breads, low-fat dairy products, beans, lean meats, and fish. You may need 500 to 700 extra calories each day if you breastfeed your baby. You may also need extra protein.          Limit foods with added sugar and high amounts of fat. These foods are high in calories and low in healthy nutrients. Read food labels so you know how much sugar and fat is in the food you want to eat.     Drink 8 to 10 glasses of water per day. Water will help you make plenty of milk for your baby. It will also help prevent constipation. Drink a glass of water every time you breastfeed your baby.     Take vitamins as directed. Ask your healthcare provider what vitamins you need.     Limit caffeine and alcohol if you are breastfeeding. Caffeine and alcohol can get into your breast milk. Caffeine and alcohol can make your baby fussy. They can also  interfere with your baby's sleep. Ask your healthcare provider if you can drink alcohol or caffeine.     Rest and sleep: You may feel very tired in the postpartum period. Enough sleep will help you heal and give you energy to care for your baby. The following may help you get sleep and rest:  Nap when your baby naps. Your baby may nap several times during the day. Get rest during this time.     Limit visitors. Many people may want to see you and your baby. Ask friends or family to visit on different days. This will give you time to rest.     Do not plan too much for one day. Put off household chores so that you have time to rest. Gradually do more each day.     Ask for help from family, friends, or neighbors. Ask them to help you with laundry, cleaning, or errands. Also ask someone to watch the baby while you take a nap or relax. Ask your partner to help with the care of your baby. Pump some of your breast milk so your partner can feed your baby during the night.     Exercise after delivery: Wait until your healthcare provider says it is okay to exercise. Exercise can help you lose weight, increase your energy levels, and manage your mood. It can also prevent constipation and blood clots. Start with gentle exercises such as walking. Do more as you have more energy. You may need to avoid abdominal exercises for 1 to 2 weeks after you deliver. Talk to your healthcare provider about an exercise plan that is right for you.     Sexual activity after delivery:   Do not have sex until your healthcare provider says it is okay. You may need to wait 4 to 6 weeks before you have sex. This may prevent infection and allow time to heal.     Your menstrual cycle may begin as soon as 3 weeks after you deliver. Your period may be delayed if you breastfeed your baby. You can become pregnant before you get your first postpartum period. Talk to your healthcare provider about birth control that is right for you. Some types of birth  control are not safe during breastfeeding.     For support and more information: Join a support group for new mothers. Ask for help from family and friends with chores, errands, and care of your baby.  Office of Women's Health,  Department of Health and Human Services  [address] [place] [address]  [address] [place] [address]  Phone: [phone]  Web Address: www.womenshealth.gov  [facility]  [address] [place] [address]  Web Address: [email or web address]/[email or web address]-[email or web address]  Follow up with your doctor or obstetrician as directed: You will need to follow up within 2 to 6 weeks of delivery. Write down your questions so you remember to ask them at your visits.  © Copyright [employer] 2020 Information is for End User's use only and may not be sold, redistributed or otherwise used for commercial purposes. All illustrations and images included in CareNotes® are the copyrighted property of [employer] or [employer]  The above information is an  only. It is not intended as medical advice for individual conditions or treatments. Talk to your doctor, nurse or pharmacist before following any medical regimen to see if it is safe and effective for you.

## 2024-06-17 NOTE — ANESTHESIA POSTPROCEDURE EVALUATION
Post-Op Assessment Note    CV Status:  Stable  Pain Score: 0    Pain management: adequate       Mental Status:  Alert and awake   Hydration Status:  Euvolemic and stable   PONV Controlled:  Controlled   Airway Patency:  Patent     Post Op Vitals Reviewed: Yes    No anethesia notable event occurred.    Staff: Anesthesiologist               /79 (06/17/24 1445)    Temp 97.8 °F (36.6 °C) (06/17/24 1445)    Pulse 70 (06/17/24 1445)   Resp 20 (06/17/24 1445)    SpO2 97 % (06/17/24 1445)

## 2024-06-17 NOTE — ANESTHESIA PREPROCEDURE EVALUATION
Procedure:   SECTION () REPEAT (Uterus)    Relevant Problems   GYN   (+) 38 weeks gestation of pregnancy      HEMATOLOGY   (+) Maternal anemia      PULMONARY   (+) Asthma during pregnancy      Obstetrics/Gynecology   (+) Obesity affecting pregnancy        Physical Exam    Airway    Mallampati score: III  TM Distance: >3 FB  Neck ROM: full     Dental       Cardiovascular  Rhythm: regular, Rate: normal, Cardiovascular exam normal    Pulmonary  Pulmonary exam normal Breath sounds clear to auscultation    Other Findings  post-pubertal.      Anesthesia Plan  ASA Score- 3     Anesthesia Type- spinal with ASA Monitors.         Additional Monitors:     Airway Plan:     Comment: Intrathecal duramorph    for post op pain management .       Plan Factors-Exercise tolerance (METS): >4 METS.    Chart reviewed.   Existing labs reviewed.     Patient is not a current smoker.              Induction- intravenous.    Postoperative Plan-     Perioperative Resuscitation Plan - Level 1 - Full Code.       Informed Consent- Anesthetic plan and risks discussed with patient.

## 2024-06-17 NOTE — OP NOTE
OPERATIVE REPORT  PATIENT NAME: Adina Carvalho    :  1990  MRN: [id number]  Pt Location: [facility] L&D OR ROOM 01    SURGERY DATE: 2024    Surgeons and Role:     * Portillo Lawler MD - Primary     * Darya Gunn MD - Assisting    Preop Diagnosis:  Maternal request for repeat  delivery    Procedure(s) (LRB):   SECTION () REPEAT (N/A)    Specimen(s):  ID Type Source Tests Collected by Time Destination   A :  Tissue (Placenta on Hold) OB Only Placenta PLACENTA IN STORAGE Portillo Lawler MD 2024 1112    B :  Cord Blood Cord BLOOD GAS, VENOUS, CORD Portillo Lawler MD 2024 1110    C :  Cord Blood Cord BLOOD GAS, ARTERIAL, CORD Portillo Lawler MD 2024 1110        Surgical QBL:  Surgical QBL (mL): 259 mL      Drains:  Urethral Catheter Double-lumen;Non-latex;Straight-tip 16 Fr. (Active)   Site Assessment Clean;Skin intact 24 1029   Number of days: 0       Anesthesia Type:   Spinal    Operative Indications:  Maternal Request for Repeat  Delivery     Geovanni Group Classification System:  No Multiple pregnancy, No Transverse or oblique lie, No Breech lie, Gestational age is > or =37 weeks, Multiparous, Previous uterine scar +  is GEOVANNI GROUP 5    Operative Findings:  Normal tubes and ovaries bilaterally  Adhesive disease present from fascia to peritoneum  Normal uterus without fibroids  Intact bladder  Liveborn male     Complications:   None    Procedure and Technique:    The patient was seen prior to the procedure. Risks, benefits, possible complications, alternate treatment options, and expected outcomes were discussed with the patient.  The patient agreed with the proposed plan and gave informed consent for a repeat low transverse  section.      The patient was taken to the OB Operating Room at 1020 where she received spinal anesthesia. For infection prophylaxis, she received 900 mg clindamycin and  250 mg gentamicin preoperatively. Fetal heart tones in the OR were assessed and noted to be within normal limits and a Smith catheter and SCDs were placed.  The abdomen was prepped with Chloraprep, the vagina was prepped with Chlorhexidine, and following appropriate drying time, the patient was draped in the usual sterile manner.   A Time Out was held and the above information confirmed.  The patient was identified as Adina Carvalho and the procedure verified as a  Delivery for maternal request for repeat  delivery.    A Pfannenstiel incision was made and carried down through the underlying subcutaneous tissue to the fascia using a scalpel. The rectus fascia was then nicked in the midline and dissected laterally using Yoder scissors and a Bovie. Dense adhesions were noted within and below the fascia.  The superior edge of the  fascial incision was grasped with Kocher clamps bilaterally, tented upward and the underlying rectus muscles were dissected off sharply with a scalpel.  This was repeated on the inferior edge of the fascia and dissected down to the pubic rami.  The rectus muscles were  and the peritoneum was identified, entered sharply with hemostats and Metzenbaum scissors, and extended longitudinally with blunt dissection and with Bovie cautery. The bladder blade was inserted.  The vesicouterine peritoneum was identified, entered sharply, and dissected laterally with  Metzenbaum scissors to form a bladder flap. The bladder blade was repositioned.  Two moist laps were packed into the paracolic gutters to retract bowel and omentum. A low transverse uterine incision was made with the scalpel and extended laterally with blunt dissection. The amnion was entered bluntly.      The fetal head was palpated, elevated, and delivered through the uterine incision followed by the body without difficulty. Time of birth was noted at 1109. There was noted to be spontaneous cry and good tone.  There was no apparent injury to the . The umbilical cord was doubly clamped and cut after 30 seconds to allow for delayed cord clamping.  The infant was handed off to the  providers.  Arterial and venous cord gases, cord blood, and a segment of umbilical cord were obtained for evaluation.  The placenta delivered spontaneously at 1112 with uterine fundal massage and appeared normal. The uterus was exteriorized and cleaned out with a moist lap sponge.     The uterine incision was closed with a running locked suture of 0 Vicryl. A second layer of the same suture was used to imbricate the first.  Hemostasis was noted to be excellent.  Normal saline solution was used to irrigate the posterior culdesac.  The uterus was returned to the abdomen. The wet laps were removed from the paracolic gutters.  The fascia was closed with a running suture of 0 Vicryl. Subcutaneous adipose tissue was closed with a running suture of 2-0 Plain gut.  The skin was closed with a subcuticular running suture of 4-0 Monocryl.  Exofin was applied.      The patient appeared to tolerate the procedure very well.  Lap sponge, needle, and instrument counts were correct x2.  The patient's fundus was palpated and the uterus was expressed. She was then cleaned and transferred to her postpartum recovery room in stable condition and her infant went to the  nursery.    Attending Attestation: Dr. Portillo Lawler MD was present for the entire procedure.  Darya Gunn MD  OB/GYN PGY-1  2024 12:39 PM

## 2024-06-17 NOTE — DISCHARGE SUMMARY
Obstetrics Discharge Summary   Adina Carvalho 34 y.o. adult MRN: [id number]  Unit/Bed#: -01 Encounter: [id number]    Admission Date: 2024     Discharge Date:     Admitting Diagnoses:   Pregnancy at 39w  History of  section x1  FGR  Asthma  Anemia    Discharge Diagnoses:   Same, delivered      Procedures:   repeat  section, low transverse incision    Admitting Attending: Bucky Cerda Attending: Bucky Cox Attending: Ardite    Delivery  Route of Delivery: , Low Transverse  Reason for  delivery: Prior  1    Anesthesia: Spinal,   QBL: 259 mL    Delivery: , Low Transverse at 2024 11:09 AM    Baby's Weight: 2940 g (6 lb 7.7 oz); 103.7    Apgar scores: 8  and 9  at 1 and 5 minutes, respectively    Hospital Course:   Adina Carvalho is now a 34 y.o.  who was initially admitted at 39w0d for scheduled repeat  section. She underwent an uncomplicated delivery.       The patient's post partum course was unremarkable.. Her preoperative hemoglobin was 11 g/dL, postoperative was 9. Her postoperative pain was well controlled with oral analgesics.    On day of discharge, she was ambulating and able to reasonably perform all ADLs. She was voiding and had appropriate bowel function. Pain was well controlled. She was discharged home on post-operative day #3 without complications. Patient was instructed to follow up with her OB as an outpatient and was given appropriate warnings to call provider if she develops signs of infection or uncontrolled pain. Mom's blood type is A positive, so RhoGAM was not indicated.      Complications:   None    Condition at discharge:   good     Provisions for Follow-Up Care:  See after visit summary for information related to follow-up care and any pertinent home health orders.      Disposition:   Home    Planned Readmission:   No    Discharge Medications:   Please see AVS for a complete list of  discharge medications.    Discharge instructions :   Please see AVS for complete discharge instructions.

## 2024-06-17 NOTE — ASSESSMENT & PLAN NOTE
Routine postpartum care  Encourage ambulation  Encourage familial bonding  Lactation support as needed  Pain: Motrin/Tylenol around the clock, oxycodone if needed  Postpartum Contraceptive plan: OCP at 6w PP  Pre-delivery Hgb 11.0 --> 9.0 > venofer  DVT Ppx: ambulation, SCDs, Lovenox 40mg BID

## 2024-06-17 NOTE — PLAN OF CARE

## 2024-06-17 NOTE — ANESTHESIA PROCEDURE NOTES
Spinal Block    Patient location during procedure: OR  Start time: 6/17/2024 10:29 AM  Reason for block: procedure for pain and at surgeon's request  Staffing  Performed by: Leif Bella CRNA  Authorized by: Dennis Cobb DO    Preanesthetic Checklist  Completed: patient identified, IV checked, site marked, risks and benefits discussed, surgical consent, monitors and equipment checked, pre-op evaluation and timeout performed  Spinal Block  Patient position: sitting  Prep: ChloraPrep and site prepped and draped  Patient monitoring: frequent blood pressure checks, continuous pulse ox and heart rate  Approach: midline  Location: L3-4  Needle  Needle type: Pencan   Needle gauge: 24 G  Needle length: 4 in  Assessment  Sensory level: T4  Injection Assessment:  negative aspiration for heme, no paresthesia on injection and positive aspiration for clear CSF.  Post-procedure:  site cleaned

## 2024-06-17 NOTE — PROGRESS NOTES
"Obstetrics & Gynecology Progress Note  Adina Carvalho 34 y.o. adult MRN: [id number]  Unit/Bed#: -01 Encounter: [id number]      SUBJECTIVE:   Pain well controlled. She is tolerating PO intake. Voiding status: rose in place. Flatus: not yet. Bowel movement: none. Chest pain/shortness of breath: none. Calf pain: none.    OBJECTIVE:  Vitals:   /79 (BP Location: Right arm)   Pulse 70   Temp 97.8 °F (36.6 °C) (Oral)   Resp 20   Ht 5' 2\" (1.575 m)   Wt 114 kg (252 lb)   LMP 10/22/2023 (Approximate)   SpO2 97%   Breastfeeding Unknown   BMI 46.09 kg/m²       Intake/Output Summary (Last 24 hours) at 6/17/2024 1533  Last data filed at 6/17/2024 1244  Gross per 24 hour   Intake 2170 ml   Output 509 ml   Net 1661 ml       Invasive Devices       Peripheral Intravenous Line  Duration             Peripheral IV 06/17/24 Left Wrist <1 day              Drain  Duration             Urethral Catheter Double-lumen;Non-latex;Straight-tip 16 Fr. <1 day                    Physical Exam:   GEN: appears well, alert and oriented x 3, pleasant and cooperative   CARDIAC: regular rate and rhythm  PULMONARY: lungs clear to auscultation  ABDOMEN: soft, no tenderness, no distention, uterus firm and 2 cm below umbilicus, Incision clean, dry, intact  EXTREMITIES: SCDs on and pumping, nontender    ASSESSMENT/PLAN:  Postop Day #0 s/p RLTCS, stable. Continue routine postoperative care.  -Out of bed/ ambulation as tolerated   -regular diet  -Pain control with PO pain meds  -FU am labs  -rose in place, anticipate removal overnight    Darya Gunn MD  PGY 1, Obstetrics and Gynecology  6/17/2024  3:33 PM          "

## 2024-06-18 PROBLEM — D62 ABLA (ACUTE BLOOD LOSS ANEMIA): Status: ACTIVE | Noted: 2024-04-03

## 2024-06-18 LAB
BASOPHILS # BLD AUTO: 0.02 THOUSANDS/ÂΜL (ref 0–0.1)
BASOPHILS NFR BLD AUTO: 0 % (ref 0–1)
EOSINOPHIL # BLD AUTO: 0.06 THOUSAND/ÂΜL (ref 0–0.61)
EOSINOPHIL NFR BLD AUTO: 1 % (ref 0–6)
ERYTHROCYTE [DISTWIDTH] IN BLOOD BY AUTOMATED COUNT: 13.4 % (ref 11.6–15.1)
HCT VFR BLD AUTO: 26.8 % (ref 36.5–46.1)
HGB BLD-MCNC: 9 G/DL (ref 12–15.4)
IMM GRANULOCYTES # BLD AUTO: 0.05 THOUSAND/UL (ref 0–0.2)
IMM GRANULOCYTES NFR BLD AUTO: 1 % (ref 0–2)
LYMPHOCYTES # BLD AUTO: 1.12 THOUSANDS/ÂΜL (ref 0.6–4.47)
LYMPHOCYTES NFR BLD AUTO: 15 % (ref 14–44)
MCH RBC QN AUTO: 31.7 PG (ref 26.8–34.3)
MCHC RBC AUTO-ENTMCNC: 33.6 G/DL (ref 31.4–37.4)
MCV RBC AUTO: 94 FL (ref 82–98)
MONOCYTES # BLD AUTO: 0.55 THOUSAND/ÂΜL (ref 0.17–1.22)
MONOCYTES NFR BLD AUTO: 7 % (ref 4–12)
NEUTROPHILS # BLD AUTO: 5.87 THOUSANDS/ÂΜL (ref 1.85–7.62)
NEUTS SEG NFR BLD AUTO: 76 % (ref 43–75)
NRBC BLD AUTO-RTO: 0 /100 WBCS
PLATELET # BLD AUTO: 182 THOUSANDS/UL (ref 149–390)
PMV BLD AUTO: 10.2 FL (ref 8.9–12.7)
RBC # BLD AUTO: 2.84 MILLION/UL (ref 3.88–5.12)
WBC # BLD AUTO: 7.67 THOUSAND/UL (ref 4.31–10.16)

## 2024-06-18 PROCEDURE — 85025 COMPLETE CBC W/AUTO DIFF WBC: CPT

## 2024-06-18 PROCEDURE — 99024 POSTOP FOLLOW-UP VISIT: CPT | Performed by: OBSTETRICS & GYNECOLOGY

## 2024-06-18 RX ADMIN — ACETAMINOPHEN 650 MG: 325 TABLET, FILM COATED ORAL at 15:43

## 2024-06-18 RX ADMIN — IBUPROFEN 600 MG: 600 TABLET, FILM COATED ORAL at 13:39

## 2024-06-18 RX ADMIN — IRON SUCROSE 200 MG: 20 INJECTION, SOLUTION INTRAVENOUS at 09:58

## 2024-06-18 RX ADMIN — KETOROLAC TROMETHAMINE 30 MG: 30 INJECTION, SOLUTION INTRAMUSCULAR; INTRAVENOUS at 07:55

## 2024-06-18 RX ADMIN — ACETAMINOPHEN 650 MG: 325 TABLET, FILM COATED ORAL at 22:49

## 2024-06-18 RX ADMIN — ACETAMINOPHEN 650 MG: 325 TABLET, FILM COATED ORAL at 09:56

## 2024-06-18 RX ADMIN — ENOXAPARIN SODIUM 40 MG: 40 INJECTION SUBCUTANEOUS at 20:03

## 2024-06-18 RX ADMIN — DOCUSATE SODIUM 100 MG: 100 CAPSULE, LIQUID FILLED ORAL at 09:57

## 2024-06-18 RX ADMIN — KETOROLAC TROMETHAMINE 30 MG: 30 INJECTION, SOLUTION INTRAMUSCULAR at 00:57

## 2024-06-18 RX ADMIN — ACETAMINOPHEN 650 MG: 325 TABLET, FILM COATED ORAL at 04:35

## 2024-06-18 RX ADMIN — ENOXAPARIN SODIUM 40 MG: 40 INJECTION SUBCUTANEOUS at 08:05

## 2024-06-18 RX ADMIN — OXYCODONE 10 MG: 5 TABLET ORAL at 20:09

## 2024-06-18 RX ADMIN — KETOROLAC TROMETHAMINE 30 MG: 30 INJECTION, SOLUTION INTRAMUSCULAR at 07:55

## 2024-06-18 RX ADMIN — DOCUSATE SODIUM 100 MG: 100 CAPSULE, LIQUID FILLED ORAL at 17:56

## 2024-06-18 RX ADMIN — IBUPROFEN 600 MG: 600 TABLET, FILM COATED ORAL at 20:03

## 2024-06-18 NOTE — PROGRESS NOTES
Progress Note - OB/GYN   Adina Carvalho 34 y.o. adult MRN: [id number]  Unit/Bed#: -01 Encounter: [id number]    Interview conducted with Armand Mendosa732     Assessment/Plan    Adina Carvalho is a 34 y.o.  who is PPD 1 s/p LTCS at 39w0d     Maternal anemia  Assessment & Plan  Hgb 11.0 on admission, 9.0 on POD 1  Administer Venofer    Obesity affecting pregnancy  Assessment & Plan  DVT prophylaxis with Lovenox 40 mg BID postoperatively    Asthma during pregnancy  Assessment & Plan  Continue Flovent daily and albuterol as needed  Avoid hemabate    * S/P repeat low transverse   Assessment & Plan  Routine postpartum care  Encourage ambulation  Encourage familial bonding  Lactation support as needed  Pain: Motrin/Tylenol around the clock, oxycodone if needed  Postpartum Contraceptive plan: OCP at 6w PP  Pre-delivery Hgb 11.0 --> 9.0 > venofer  Smith catheter: removed, pending void trial  DVT Ppx: ambulation, SCDs, Lovenox 40mg BID           Disposition: Anticipate discharge home postpartum Day #2-3  Barriers to discharge: ongoing couplet care      Subjective/Objective     Subjective: Postpartum state    Pain: no  Tolerating PO: yes  Voiding: yes  Flatus: yes  BM: no  Ambulating: yes  Breastfeeding: Bottle feeding  Chest pain: no  Shortness of breath: no  Leg pain: no  Lochia: minimal    Objective:     Vitals:  Vitals:    24 1545 24 1900 24 2300 24 0300   BP: 101/60 110/71 101/59 121/73   BP Location: Right arm Right arm Right arm Right arm   Pulse: 67 78 79 81   Resp:    Temp: 98.4 °F (36.9 °C) 97.9 °F (36.6 °C) 98 °F (36.7 °C) 98 °F (36.7 °C)   TempSrc: Oral Oral Oral Oral   SpO2: 97% 98% 99% 98%   Weight:       Height:           Physical Exam:   GEN: appears well, alert and oriented x 3, pleasant and cooperative   CV: Regular rate and rhythm  RESP: non labored breathing, lungs clear to auscultation   ABDOMEN: soft, no tenderness, no  distention, Uterine fundus firm and non-tender, -1 cm below the umbilicus, incision c/d/i  EXTREMITIES: non-tender  NEURO Alert and oriented to person, place, and time.       Lab Results   Component Value Date    WBC 7.67 06/18/2024    HGB 9.0 (L) 06/18/2024    HCT 26.8 (L) 06/18/2024    MCV 94 06/18/2024     06/18/2024         Darya Gunn MD  Obstetrics & Gynecology  06/18/24

## 2024-06-19 PROCEDURE — 99024 POSTOP FOLLOW-UP VISIT: CPT

## 2024-06-19 RX ORDER — IBUPROFEN 600 MG/1
600 TABLET ORAL EVERY 6 HOURS
Status: DISCONTINUED | OUTPATIENT
Start: 2024-06-20 | End: 2024-06-20 | Stop reason: HOSPADM

## 2024-06-19 RX ORDER — OXYCODONE HYDROCHLORIDE 5 MG/1
5 TABLET ORAL EVERY 4 HOURS PRN
Status: DISCONTINUED | OUTPATIENT
Start: 2024-06-19 | End: 2024-06-20 | Stop reason: HOSPADM

## 2024-06-19 RX ORDER — LIDOCAINE 50 MG/G
1 PATCH TOPICAL DAILY PRN
Status: DISCONTINUED | OUTPATIENT
Start: 2024-06-19 | End: 2024-06-20 | Stop reason: HOSPADM

## 2024-06-19 RX ORDER — KETOROLAC TROMETHAMINE 10 MG/1
10 TABLET, FILM COATED ORAL EVERY 6 HOURS
Status: COMPLETED | OUTPATIENT
Start: 2024-06-19 | End: 2024-06-20

## 2024-06-19 RX ORDER — POLYETHYLENE GLYCOL 3350 17 G/17G
17 POWDER, FOR SOLUTION ORAL DAILY
Status: DISCONTINUED | OUTPATIENT
Start: 2024-06-19 | End: 2024-06-20 | Stop reason: HOSPADM

## 2024-06-19 RX ADMIN — IBUPROFEN 600 MG: 600 TABLET, FILM COATED ORAL at 02:22

## 2024-06-19 RX ADMIN — OXYCODONE 5 MG: 5 TABLET ORAL at 09:24

## 2024-06-19 RX ADMIN — ACETAMINOPHEN 650 MG: 325 TABLET, FILM COATED ORAL at 22:46

## 2024-06-19 RX ADMIN — IBUPROFEN 600 MG: 600 TABLET, FILM COATED ORAL at 08:06

## 2024-06-19 RX ADMIN — POLYETHYLENE GLYCOL 3350 17 G: 17 POWDER, FOR SOLUTION ORAL at 16:02

## 2024-06-19 RX ADMIN — ACETAMINOPHEN 650 MG: 325 TABLET, FILM COATED ORAL at 11:21

## 2024-06-19 RX ADMIN — SIMETHICONE 80 MG: 80 TABLET, CHEWABLE ORAL at 11:27

## 2024-06-19 RX ADMIN — ENOXAPARIN SODIUM 40 MG: 40 INJECTION SUBCUTANEOUS at 08:06

## 2024-06-19 RX ADMIN — ACETAMINOPHEN 650 MG: 325 TABLET, FILM COATED ORAL at 05:08

## 2024-06-19 RX ADMIN — FLUTICASONE FUROATE 1 PUFF: 200 POWDER RESPIRATORY (INHALATION) at 09:53

## 2024-06-19 RX ADMIN — ENOXAPARIN SODIUM 40 MG: 40 INJECTION SUBCUTANEOUS at 20:31

## 2024-06-19 RX ADMIN — OXYCODONE 10 MG: 5 TABLET ORAL at 13:38

## 2024-06-19 RX ADMIN — LIDOCAINE 1 PATCH: 50 PATCH TOPICAL at 16:02

## 2024-06-19 RX ADMIN — ACETAMINOPHEN 650 MG: 325 TABLET, FILM COATED ORAL at 17:35

## 2024-06-19 RX ADMIN — OXYCODONE 10 MG: 5 TABLET ORAL at 05:11

## 2024-06-19 RX ADMIN — DOCUSATE SODIUM 100 MG: 100 CAPSULE, LIQUID FILLED ORAL at 08:06

## 2024-06-19 RX ADMIN — IBUPROFEN 600 MG: 600 TABLET, FILM COATED ORAL at 14:00

## 2024-06-19 RX ADMIN — OXYCODONE 5 MG: 5 TABLET ORAL at 00:25

## 2024-06-19 RX ADMIN — KETOROLAC TROMETHAMINE 10 MG: 10 TABLET, FILM COATED ORAL at 20:39

## 2024-06-19 RX ADMIN — OXYCODONE 5 MG: 5 TABLET ORAL at 22:46

## 2024-06-19 NOTE — PROGRESS NOTES
"Progress Note - OB/GYN  Adina Carvalho 34 y.o. adult MRN: [id number]  Unit/Bed#:  420-01 Encounter: [id number]    Assessment and Plan     Adina Carvalho is a patient of: [facility], [facility]. She is PPD# 2 s/p  repeat  section, low transverse incision  Recovering well and is stable       ABLA (acute blood loss anemia)  Assessment & Plan  Hgb 11.0 on admission, 9.0 on POD 1  Administer Venofer    Obesity affecting pregnancy  Assessment & Plan  DVT prophylaxis with Lovenox 40 mg BID postoperatively    Asthma during pregnancy  Assessment & Plan  Continue Flovent daily and albuterol as needed  Avoid hemabate    * S/P repeat low transverse   Assessment & Plan  Routine postpartum care  Encourage ambulation  Encourage familial bonding  Lactation support as needed  Pain: Motrin/Tylenol around the clock, oxycodone if needed  Postpartum Contraceptive plan: OCP at 6w PP  Pre-delivery Hgb 11.0 --> 9.0 > venofer  DVT Ppx: ambulation, SCDs, Lovenox 40mg BID        Disposition    - Anticipate discharge home on PPD# 3; per patient request.      Subjective/Objective     Chief Complaint: Postpartum State     Subjective:    Adina Carvalho is POD#2 s/p  repeat  section, low transverse incision. She has no current complaints.  Pain is well controlled. She is recovering well and is stable.     Breastfeeding:  no    Tolerating PO: yes  Nausea or Vomiting: no  Voiding: yes  Flatus: yes; has had no bowel movement.   Ambulating: yes  Chest pain: no  Shortness of breath: no  Leg pain: no  Lochia: appropriate     Vitals:   /85 (BP Location: Right arm)   Pulse 78   Temp 98.1 °F (36.7 °C) (Oral)   Resp 18   Ht 5' 2\" (1.575 m)   Wt 114 kg (252 lb)   LMP 10/22/2023 (Approximate)   SpO2 98%   Breastfeeding Yes   BMI 46.09 kg/m²       Intake/Output Summary (Last 24 hours) at 2024 0914  Last data filed at 2024 1547  Gross per 24 hour   Intake --   Output 1400 ml   Net -1400 ml       Invasive Devices  "      Peripheral Intravenous Line  Duration             Peripheral IV 06/17/24 Left Wrist 2 days                    Physical Exam:   GEN: Adina Carvalho appears well, alert and oriented x 3, pleasant and cooperative   CARDIO: RRR, no murmurs or rubs  RESP:  CTAB, no wheezes or rales  ABDOMEN: soft, no tenderness, no distention, fundus @ U-1, Incision C/D/I  EXTREMITIES: SCDs on, non tender, no erythema, b/l Eliz's sign negative      Labs:     Hemoglobin   Date Value Ref Range Status   06/18/2024 9.0 (L) 12.0 - 15.4 g/dL Final   06/17/2024 11.0 (L) 12.0 - 15.4 g/dL Final     WBC   Date Value Ref Range Status   06/18/2024 7.67 4.31 - 10.16 Thousand/uL Final   06/17/2024 5.51 4.31 - 10.16 Thousand/uL Final     Platelets   Date Value Ref Range Status   06/18/2024 182 149 - 390 Thousands/uL Final   06/17/2024 238 149 - 390 Thousands/uL Final     Creatinine   Date Value Ref Range Status   08/10/2023 0.54 (L) 0.60 - 1.30 mg/dL Final     Comment:     Standardized to IDMS reference method   06/07/2023 0.66 0.40 - 1.10 mg/dL Final     Comment:     QA FLAGS AND/OR RANGES MODIFIED BY DEMOGRAPHIC UPDATE ON 06/08 AT 0752   08/01/2022 0.76 0.60 - 1.20 mg/dL Final     Comment:     Standardized to IDMS reference method     AST   Date Value Ref Range Status   08/10/2023 19 13 - 39 U/L Final   06/07/2023 18 <41 U/L Final   08/01/2022 24 14 - 36 U/L Final     Comment:     Specimen collection should occur prior to Sulfasalazine administration due to the potential for falsely depressed results.      ALT   Date Value Ref Range Status   08/10/2023 16 7 - 52 U/L Final     Comment:     Specimen collection should occur prior to Sulfasalazine administration due to the potential for falsely depressed results.    06/07/2023 13 <56 U/L Final   08/01/2022 25 <35 U/L Final     Comment:     Specimen collection should occur prior to Sulfasalazine administration due to the potential for falsely depressed results.           Love Arellano,  TAPAN  6/19/2024  9:14 AM

## 2024-06-19 NOTE — QUICK NOTE
"Subjective:  Presented to bedside to evaluate Adina who was reporting lower abdominal and back pain.  She said that when she was straining to attempt to have a bowel movement she felt increased pain in her back and lower abdomen.  She has not yet had a bowel movement since her  2 days ago.  She has been taking Tylenol and Motrin and has been taking oxycodone 10 mg fairly regularly which she says takes the edge off the pain however does not completely resolve it.  She is only been walking minimally in her room and has not been walking around the halls today.  She reports that she is tolerating oral intake, drinking well, urinating well.  She describes the pain as constant and it feels more sharp rather than cramping.  On evaluation she is sitting at a desk writing and texting on her phone, not in distress.     /87 (BP Location: Right arm)   Pulse 70   Temp 98.1 °F (36.7 °C) (Oral)   Resp 18   Ht 5' 2\" (1.575 m)   Wt 114 kg (252 lb)   LMP 10/22/2023 (Approximate)   SpO2 99%   Breastfeeding Yes   BMI 46.09 kg/m²     Exam:  General: sitting comfortably, not in distress  CV: regular rate and rhythm  Pulm: lungs clear to auscultation bilaterally  Abd: soft, uterus firm and one centimeter below umbilicus, no rebound tenderness or guarding, mild tenderness to palpation infraumbilically, incision clean, dry, intact    Plan:   Discussed that her pain is likely multifactorial post-surgical exacerbated by constipation. Recommended trying lidocaine patch for back pain, adding miralax to bowel regimen, and taking oxycodone only as needed as it worsens constipation. I also recommended she try to walk as much as possible. Low suspicion for intra-abdominal process such as bleeding or infection as she is not tachycardic, appears comfortable sitting, and with a soft abdomen that is appropriately tender to palpation.     Discussed with Dr. Jose Antonio Gunn MD  PGY 1, Obstetrics and " Gynecology  6/19/2024  3:32 PM

## 2024-06-20 VITALS
OXYGEN SATURATION: 99 % | HEIGHT: 62 IN | HEART RATE: 85 BPM | RESPIRATION RATE: 18 BRPM | TEMPERATURE: 97.8 F | DIASTOLIC BLOOD PRESSURE: 62 MMHG | BODY MASS INDEX: 46.38 KG/M2 | SYSTOLIC BLOOD PRESSURE: 115 MMHG | WEIGHT: 252 LBS

## 2024-06-20 PROCEDURE — 99024 POSTOP FOLLOW-UP VISIT: CPT | Performed by: OBSTETRICS & GYNECOLOGY

## 2024-06-20 RX ORDER — IBUPROFEN 600 MG/1
600 TABLET ORAL EVERY 6 HOURS
Qty: 30 TABLET | Refills: 0 | Status: CANCELLED | OUTPATIENT
Start: 2024-06-20

## 2024-06-20 RX ORDER — LIDOCAINE 50 MG/G
1 PATCH TOPICAL DAILY PRN
Start: 2024-06-20

## 2024-06-20 RX ORDER — ACETAMINOPHEN 325 MG/1
650 TABLET ORAL EVERY 6 HOURS SCHEDULED
Qty: 2 TABLET | Refills: 0 | Status: CANCELLED | OUTPATIENT
Start: 2024-06-20 | End: 2024-06-21

## 2024-06-20 RX ORDER — OXYCODONE HYDROCHLORIDE 5 MG/1
5 TABLET ORAL EVERY 4 HOURS PRN
Status: CANCELLED
Start: 2024-06-20 | End: 2024-06-30

## 2024-06-20 RX ORDER — IBUPROFEN 600 MG/1
600 TABLET ORAL EVERY 6 HOURS
Qty: 30 TABLET | Refills: 0 | Status: SHIPPED | OUTPATIENT
Start: 2024-06-20

## 2024-06-20 RX ORDER — BENZOCAINE/MENTHOL 6 MG-10 MG
1 LOZENGE MUCOUS MEMBRANE DAILY PRN
Start: 2024-06-20

## 2024-06-20 RX ADMIN — KETOROLAC TROMETHAMINE 10 MG: 10 TABLET, FILM COATED ORAL at 08:22

## 2024-06-20 RX ADMIN — FLUTICASONE FUROATE 1 PUFF: 200 POWDER RESPIRATORY (INHALATION) at 08:21

## 2024-06-20 RX ADMIN — OXYCODONE 5 MG: 5 TABLET ORAL at 04:05

## 2024-06-20 RX ADMIN — POLYETHYLENE GLYCOL 3350 17 G: 17 POWDER, FOR SOLUTION ORAL at 08:21

## 2024-06-20 RX ADMIN — IBUPROFEN 600 MG: 600 TABLET, FILM COATED ORAL at 14:54

## 2024-06-20 RX ADMIN — GLYCERIN 1 SUPPOSITORY: 2 SUPPOSITORY RECTAL at 11:16

## 2024-06-20 RX ADMIN — KETOROLAC TROMETHAMINE 10 MG: 10 TABLET, FILM COATED ORAL at 01:52

## 2024-06-20 RX ADMIN — ENOXAPARIN SODIUM 40 MG: 40 INJECTION SUBCUTANEOUS at 08:21

## 2024-06-20 RX ADMIN — ACETAMINOPHEN 650 MG: 325 TABLET, FILM COATED ORAL at 11:12

## 2024-06-20 RX ADMIN — ACETAMINOPHEN 650 MG: 325 TABLET, FILM COATED ORAL at 04:05

## 2024-06-20 NOTE — CASE MANAGEMENT
CARE MANAGEMENT ASSESSMENT    Consult(s):     CM met w/MOB who provided the following information:      Baby's name/gender: Hung Cano, male  Mother of baby: Adina Carvalho  Father of baby//SO: Hung Chapin  Other Legal Guardian(s) for baby:   Alternate emergency contact:   Other children: Crescencio, 10 months  Lives with: mother, father, maternal grandmother  Support System: maternal grandmother  Baby Supplies:  MOB states has car seat, crib, diapers, wipes, formula  Bottle or Breast Feeding: Bottle feeding  Breast Pump if breast feeding:   Government Assistance Programs/WIC/EBT/SSI:  MOB confirmed she has WIC and SNAP  Work/School:  father employed  Transportation: Both MOB and FOB drive  Prenatal care:   [facility] [place]  Pediatrician:  [facility] Hx or Treatment: MOB denies  Substance Abuse: MOB denies  Hx DV/IPV: not reported  Legal (probation/parole/incarceration): MOB denies  Community Referrals/C&Y/NFP:  MOB denies  Insurance for baby: [employer]     MOB denies any other CM needs at this time.   Provided MOB with [facility] Resource List for [facility].  MOB reports she has a pediatric appointment tomorrow at [facility], encouraged family to contact CM as needed.    Bety SANTOS  6/20/2024  4:05 PM

## 2024-06-20 NOTE — NURSING NOTE
Patient received POST BIRTH magnet and discharge education. The patient denied having any further questions at this time.    ALYSSA Grigsby  
normal for race

## 2024-06-20 NOTE — PROGRESS NOTES
Progress Note - OB/GYN   Adina Carvalho 34 y.o. adult MRN: [id number]  Unit/Bed#: -01 Encounter: [id number]      Assessment/Plan    Adina Carvalho is a 34 y.o.  who is PPD 3 s/p LTCS at 39w0d     ABLA (acute blood loss anemia)  Assessment & Plan  Hgb 11.0 on admission, 9.0 on POD 1  S/p Venofer    Obesity affecting pregnancy  Assessment & Plan  DVT prophylaxis with Lovenox 40 mg BID postoperatively    Asthma during pregnancy  Assessment & Plan  Continue Flovent daily and albuterol as needed  Avoid hemabate    * S/P repeat low transverse   Assessment & Plan  Routine postpartum care  Encourage ambulation  Encourage familial bonding  Lactation support as needed  Pain: Motrin/Tylenol around the clock, oxycodone if needed  Postpartum Contraceptive plan: OCP at 6w PP  Pre-delivery Hgb 11.0 --> 9.0 > venofer  DVT Ppx: ambulation, SCDs, Lovenox 40mg BID           Disposition: Anticipate discharge home postpartum Day #3-4  Barriers to discharge: ongoing couplet care      Subjective/Objective     Subjective: Postpartum state    Pain: no  Tolerating PO: yes  Voiding: yes  Flatus: yes  BM: no  Ambulating: yes  Breastfeeding: Breastfeeding and Bottle feeding  Chest pain: no  Shortness of breath: no  Leg pain: no  Lochia: minimal    Objective:     Vitals:  Vitals:    24 0700 24 1100 24 1455 24 2300   BP: 131/85 117/71 121/87 134/79   BP Location: Right arm Right arm Right arm Right arm   Pulse: 78 70 70 78   Resp: 18 18 18 16   Temp: 98.1 °F (36.7 °C) 97.7 °F (36.5 °C) 98.1 °F (36.7 °C) 97.7 °F (36.5 °C)   TempSrc: Oral Oral Oral Temporal   SpO2: 98% 99% 99% 97%   Weight:       Height:           Physical Exam:   GEN: appears well, alert and oriented x 3, pleasant and cooperative   CV: Regular rate and rhythm  RESP: non labored breathing, lungs clear to auscultation  ABDOMEN: soft, no tenderness, no distention, Uterine fundus firm and non-tender, -1 cm below the umbilicus,  incision c/d/i  EXTREMITIES: non-tender  NEURO Alert and oriented to person, place, and time.       Lab Results   Component Value Date    WBC 7.67 06/18/2024    HGB 9.0 (L) 06/18/2024    HCT 26.8 (L) 06/18/2024    MCV 94 06/18/2024     06/18/2024         Darya Gunn MD  Obstetrics & Gynecology  06/20/24

## 2024-06-21 NOTE — UTILIZATION REVIEW
"Mother and baby  discharged on 2024       NOTIFICATION OF INPATIENT ADMISSION   MATERNITY/DELIVERY AUTHORIZATION REQUEST   SERVICING FACILITY:   [facility] - L&D, [place], NICU  [address] [place] [address]  Tax ID: [id number]  NPI: [id number] ATTENDING PROVIDER:  Attending Name and NPI#: Portillo Lawler Md [[id number]]  Address: [address] [place] [address]  Phone: [phone]     ADMISSION INFORMATION:  Place of Service: Inpatient [facility]  Place of Service Code: 21  Inpatient Admission Date/Time: 24  8:21 AM  Discharge Date/Time: 2024  5:45 PM  Admitting Diagnosis Code/Description:  Encounter for  delivery without indication [O82]   Mother: Adina Carvalho 1990 Estimated Date of Delivery: 24  Delivering clinician: Portillo Lawler   OB History          2    Para   2    Term   1       1    AB   0    Living   2         SAB   0    IAB   0    Ectopic   0    Multiple   0    Live Births   2               [place] Name & MRN:   Information for the patient's :  Jerome Carvalho, Baby Boy (Adina) [[id number]]   [place] Delivery Information:  Sex: male  Delivered 2024 11:09 AM by , Low Transverse; Gestational Age: 39w0d     Measurements:  Weight: 6 lb 7.7 oz (2940 g);  Height: 19\"    APGAR 1 minute 5 minutes 10 minutes   Totals: 8 9       UTILIZATION REVIEW CONTACT:  Megan Mckeon, Utilization   Network Utilization Review Department  Phone: [phone]  Fax [phone]  Email: [email or web address]  Contact for approvals/pending authorizations, clinical reviews, and discharge.   PHYSICIAN ADVISORY SERVICES:  Medical Necessity Denial & Nunn-ds-Lrwv Review  Phone: [phone]  Fax: [phone]  Email: [email or web address]   DISCHARGE SUPPORT TEAM:  For Patients Discharge Needs & Updates  Phone: [phone] opt. 2 Fax: " [phone]  Email: [email or web address]

## 2024-06-24 ENCOUNTER — POSTPARTUM VISIT (OUTPATIENT)
Dept: OBGYN CLINIC | Facility: CLINIC | Age: 34
End: 2024-06-24

## 2024-06-24 VITALS
HEART RATE: 77 BPM | SYSTOLIC BLOOD PRESSURE: 136 MMHG | DIASTOLIC BLOOD PRESSURE: 90 MMHG | WEIGHT: 247.2 LBS | BODY MASS INDEX: 45.49 KG/M2 | HEIGHT: 62 IN

## 2024-06-24 DIAGNOSIS — Z59.9 FINANCIAL DIFFICULTIES: ICD-10-CM

## 2024-06-24 DIAGNOSIS — Z59.12 INADEQUATE HOUSING UTILITIES: ICD-10-CM

## 2024-06-24 PROBLEM — D62 ABLA (ACUTE BLOOD LOSS ANEMIA): Status: RESOLVED | Noted: 2024-04-03 | Resolved: 2024-06-24

## 2024-06-24 PROBLEM — O09.299 PRIOR PREGNANCY COMPLICATED BY IUGR, ANTEPARTUM: Status: RESOLVED | Noted: 2024-01-27 | Resolved: 2024-06-24

## 2024-06-24 PROBLEM — O09.899 SHORT INTERVAL BETWEEN PREGNANCIES AFFECTING PREGNANCY, ANTEPARTUM: Status: RESOLVED | Noted: 2024-01-27 | Resolved: 2024-06-24

## 2024-06-24 PROBLEM — O99.820 GBS (GROUP B STREPTOCOCCUS CARRIER), +RV CULTURE, CURRENTLY PREGNANT: Status: RESOLVED | Noted: 2024-06-03 | Resolved: 2024-06-24

## 2024-06-24 PROBLEM — O99.210 OBESITY AFFECTING PREGNANCY: Status: RESOLVED | Noted: 2024-01-27 | Resolved: 2024-06-24

## 2024-06-24 PROBLEM — O99.519 ASTHMA DURING PREGNANCY: Status: RESOLVED | Noted: 2024-01-27 | Resolved: 2024-06-24

## 2024-06-24 PROBLEM — O09.899 HISTORY OF PRETERM DELIVERY, CURRENTLY PREGNANT: Status: RESOLVED | Noted: 2024-01-27 | Resolved: 2024-06-24

## 2024-06-24 PROBLEM — Z98.891 S/P REPEAT LOW TRANSVERSE C-SECTION: Status: RESOLVED | Noted: 2024-01-27 | Resolved: 2024-06-24

## 2024-06-24 PROBLEM — R87.619 ABNORMAL PAP SMEAR OF CERVIX: Status: RESOLVED | Noted: 2024-02-10 | Resolved: 2024-06-24

## 2024-06-24 PROBLEM — O36.5990 IUGR (INTRAUTERINE GROWTH RESTRICTION) AFFECTING CARE OF MOTHER: Status: RESOLVED | Noted: 2024-06-12 | Resolved: 2024-06-24

## 2024-06-24 PROBLEM — J45.909 ASTHMA DURING PREGNANCY: Status: RESOLVED | Noted: 2024-01-27 | Resolved: 2024-06-24

## 2024-06-24 PROBLEM — O34.219 HISTORY OF CESAREAN DELIVERY, ANTEPARTUM: Status: RESOLVED | Noted: 2024-01-27 | Resolved: 2024-06-24

## 2024-06-24 PROCEDURE — 99024 POSTOP FOLLOW-UP VISIT: CPT | Performed by: NURSE PRACTITIONER

## 2024-06-24 SDOH — ECONOMIC STABILITY - HOUSING INSECURITY: INADEQUATE HOUSING UTILITIES: Z59.12

## 2024-06-24 SDOH — ECONOMIC STABILITY - INCOME SECURITY: PROBLEM RELATED TO HOUSING AND ECONOMIC CIRCUMSTANCES, UNSPECIFIED: Z59.9

## 2024-06-24 NOTE — PATIENT INSTRUCTIONS
Jessica por ricketts confianza en nuestro equipo.   Le agradecemos y agradecemos leoncio comentarios.   Si recibe radha encuesta nuestra, tómese unos momentos para informarnos cómo estamos.   Sinceramente,  TAPAN Suarez       DESPUÉS DEL PARTO      Debe comunicarse con ricketts proveedor de GINECÓLOGO si usted experimenta cualquiera de los siguientes:  1. sangrado que empapa radha almohadilla cada hora jeremy 2 horas.  2. mal olor procedente de la vagina.  3. fiebre de 100.4 o superior.  4. incisión o dolor abdominal que no irá lejos a pesar de prescribe medicamentos para el dolor.  5. hinchazón, enrojecimiento, secreción o sangrado de la incisión de cesárea o sitio de desgarro perineal.  6. la incisión se comienza a separar.  7. problemas para orinar incluyendo incapacidad para orinar, ardor al orinar o muy oscura de la orina.  8. no movimiento intestinal dentro de 4 días de carlos manuel a mak, o la dificultad con las deposiciones después de eso.  9. cualquier tipo de disturbio visual (visión doble, Desenfoque, etcetera).  10. cefalea.  11. gripe-olimpia síntomas.  12. dolor o enrojecimiento en nuno o ambos de leoncio pechos.  13. dolor, calor, sensibilidad o hinchazón en las piernas, especialmente la radha de la pantorrilla.  14. frecuentes náuseas y vómitos.  15. signos de depresión o ansiedad.  16. Si experimenta indira de pecho o tiene problemas para respirar, llame al 911 inmediatamente.    En general puede reanudar el coito sexual después de 6 semanas de la entrega.  Es importante continuar cambiando tus toallas sanitarias con frecuencia y limpiar el perineo al menos 2 - 3 veces al día.  Mantenga la incisión abdominal después de entrega cesariana limpio y seco en todo momento.

## 2024-06-24 NOTE — PROGRESS NOTES
"POSTPARTUM VISIT    Adina Carvalho presents today for postoperative incision check.  She had a  delivery on 2024.  Complications included none.  She is bottlefeeding her infant and reports no issues with such.  She was provided with [place] Depression Screening tool and her score was 1.    Review of Systems:   -Constitutional: denies issues, reports incisional pain well-controlled with Ibuprofen/Tylenol   -Breasts: denies tenderness   -Gynecologic: lochia light   -Urinary: denies issues urinating   -GI: stools WNL, denies issues    Physical Exam:   -Vitals:   Vitals:    24 1136   BP: 136/90   BP Location: Left arm   Patient Position: Sitting   Cuff Size: Standard   Pulse: 77   Weight: 112 kg (247 lb 3.2 oz)   Height: 5' 2\" (1.575 m)      -General: A&Ox3, no acute distress noted   -Abdomen: soft, non-tender, incision appears clean/dry/intact and healing well   -Extremities: nontender, +1 BLE edema      Assessment/Plan:  1. Normal postpartum exam.  2. Depression screening negative.  3. Return in 4 weeks for incision check      "

## 2024-06-25 ENCOUNTER — PATIENT OUTREACH (OUTPATIENT)
Dept: OBGYN CLINIC | Facility: CLINIC | Age: 34
End: 2024-06-25

## 2024-06-25 LAB — PLACENTA IN STORAGE: NORMAL

## 2024-06-25 NOTE — PROGRESS NOTES
"ESMRE THURSTON was referred by TAPAN Alcocer at pts PP visit this week. Pt delivered her baby one week ago at [facility]. Pt was seen by MILTON THURSTON at that time, IP CM provided pt with the infant resource list for any baby supply needs. ESMER THURSTON received a referral at PP visit for SDOH \"financial insecurity\" ESMER THURSTON attempted to outreach by phone today with [employer] interpretor, we called twice, there was no answer, there was no ability to leave a VM.  "

## 2024-06-28 ENCOUNTER — PATIENT OUTREACH (OUTPATIENT)
Dept: OBGYN CLINIC | Facility: CLINIC | Age: 34
End: 2024-06-28

## 2024-06-28 NOTE — PROGRESS NOTES
ESMER THURSTON outreached pt for SDOH referral that was placed for financial concerns. ESMER THURSTON outreached pt with the [employer] interpretor, pt did answer, ESMER Thurston explained self and reason for call, pt declines any needs at this time. ESMER THURSTON encouraged her to outreach for any needs. ESMER THURSTON will close this referral at this time.

## 2024-08-13 ENCOUNTER — TELEPHONE (OUTPATIENT)
Dept: OBGYN CLINIC | Facility: CLINIC | Age: 34
End: 2024-08-13

## 2024-08-13 ENCOUNTER — POSTPARTUM VISIT (OUTPATIENT)
Dept: OBGYN CLINIC | Facility: CLINIC | Age: 34
End: 2024-08-13

## 2024-08-13 VITALS
SYSTOLIC BLOOD PRESSURE: 132 MMHG | WEIGHT: 240.6 LBS | DIASTOLIC BLOOD PRESSURE: 87 MMHG | HEIGHT: 62 IN | BODY MASS INDEX: 44.27 KG/M2 | HEART RATE: 73 BPM

## 2024-08-13 DIAGNOSIS — Z30.09 UNWANTED FERTILITY: ICD-10-CM

## 2024-08-13 LAB — SL AMB POCT URINE HCG: POSITIVE

## 2024-08-13 PROCEDURE — 99213 OFFICE O/P EST LOW 20 MIN: CPT | Performed by: NURSE PRACTITIONER

## 2024-08-13 PROCEDURE — 81025 URINE PREGNANCY TEST: CPT | Performed by: NURSE PRACTITIONER

## 2024-08-13 RX ORDER — NORGESTIMATE AND ETHINYL ESTRADIOL 0.25-0.035
1 KIT ORAL DAILY
Qty: 28 TABLET | Refills: 3 | Status: SHIPPED | OUTPATIENT
Start: 2024-08-13

## 2024-08-13 RX ORDER — VITAMIN A ACETATE, .BETA.-CAROTENE, ASCORBIC ACID, CHOLECALCIFEROL, .ALPHA.-TOCOPHEROL ACETATE, DL-, THIAMINE MONONITRATE, RIBOFLAVIN, NIACINAMIDE, PYRIDOXINE HYDROCHLORIDE, FOLIC ACID, CYANOCOBALAMIN, CALCIUM CARBONATE, FERROUS FUMARATE, ZINC OXIDE, CUPRIC OXIDE 9.9; 120; 920; 200; 400; 2; 12; 27; 1; 20; 10; 3; 1.84; 3080; 25 MG/1; MG/1; [IU]/1; MG/1; [IU]/1; MG/1; UG/1; MG/1; MG/1; MG/1; MG/1; MG/1; MG/1; [IU]/1; MG/1
1 TABLET, FILM COATED ORAL DAILY
Qty: 90 TABLET | Refills: 4 | Status: SHIPPED | OUTPATIENT
Start: 2024-08-13

## 2024-08-13 NOTE — PROGRESS NOTES
"POSTPARTUM VISIT    Adina Carvalho presents today for postpartum visit.  She had a  delivery on 2024.  Complications included none.  She is bottlefeeding her infant and reports no issues with such.  She desires OCP's for contraception.  She was provided with [place] Depression Screening tool and her score was 1.    Review of Systems:   -Constitutional: denies issues, denies pain   -Breasts: denies tenderness   -Gynecologic: lochia stopped 2-3 weeks after delivery, no menses yet   -Urinary: denies issues urinating   -GI: stools WNL, denies issues    Physical Exam:   -Vitals:   Vitals:    24 1413   BP: 132/87   BP Location: Left arm   Patient Position: Sitting   Cuff Size: Large   Pulse: 73   Weight: 109 kg (240 lb 9.6 oz)   Height: 5' 2\" (1.575 m)      -General: A&Ox3, no acute distress noted   -Abdomen: soft, non-tender, incision appears clean/dry/intact and well-healed   -Extremities: nontender, no edema noted   -Breasts: deferred   -Pelvic exam: deferred    Point of Care Testing:   -urine pregnancy test: negative    Assessment/Plan:  1. Normal postpartum exam.  2. Depression screening negative.  3. Last pap smear was done 2024 and result was LSIL.  She colposcopy without biopsy in 3/2024 and impression was LSIL.  Return for colposcopy w/biopsy in 4 weeks.  Advise return for next annual GYN exam in 2025.  4. Contraception: Given Rx OCP's and instructed on appropriate administration technique.  Return in 3 months for surveillance visit.      "

## 2024-08-13 NOTE — PROGRESS NOTES
Patient's pregnancy test initially noted to be negative, was noted to be positive after she left. Will send her for serum Bhcg level for confirmation.  Will have my staff contact her in Czech and communicate this to her.  I will call her with the results.

## 2024-08-13 NOTE — PATIENT INSTRUCTIONS
Jessica por ricketts confianza en nuestro equipo.   Le agradecemos y agradecemos leoncio comentarios.   Si recibe radha encuesta nuestra, tómese unos momentos para informarnos cómo estamos.   Sinceramente,  TAPAN Suarez       DESPUÉS DEL PARTO      Debe comunicarse con ricketts proveedor de GINECÓLOGO si usted experimenta cualquiera de los siguientes:  1. sangrado que empapa radha almohadilla cada hora jeremy 2 horas.  2. mal olor procedente de la vagina.  3. fiebre de 100.4 o superior.  4. incisión o dolor abdominal que no irá lejos a pesar de prescribe medicamentos para el dolor.  5. hinchazón, enrojecimiento, secreción o sangrado de la incisión de cesárea o sitio de desgarro perineal.  6. la incisión se comienza a separar.  7. problemas para orinar incluyendo incapacidad para orinar, ardor al orinar o muy oscura de la orina.  8. no movimiento intestinal dentro de 4 días de carlos manuel a mak, o la dificultad con las deposiciones después de eso.  9. cualquier tipo de disturbio visual (visión doble, Desenfoque, etcetera).  10. cefalea.  11. gripe-olimpia síntomas.  12. dolor o enrojecimiento en nuno o ambos de leoncio pechos.  13. dolor, calor, sensibilidad o hinchazón en las piernas, especialmente la radha de la pantorrilla.  14. frecuentes náuseas y vómitos.  15. signos de depresión o ansiedad.  16. Si experimenta indira de pecho o tiene problemas para respirar, llame al 911 inmediatamente.    En general puede reanudar el coito sexual después de 6 semanas de la entrega.  Es importante continuar cambiando tus toallas sanitarias con frecuencia y limpiar el perineo al menos 2 - 3 veces al día.

## 2024-08-13 NOTE — LETTER
Adina Carvalho  1990           To whom it may concern,      Adina Carvalho is under our care for pregnancy and postpartum period.  She may return to work on 8/19/2024 unrestricted.  Please contact our office with any questions or concerns.        Sincerely,     TAPAN Suarez  8/13/2024

## 2024-08-14 ENCOUNTER — TELEPHONE (OUTPATIENT)
Dept: OBGYN CLINIC | Facility: CLINIC | Age: 34
End: 2024-08-14

## 2024-08-16 NOTE — TELEPHONE ENCOUNTER
Patient called in.  It was explained to her in Slovenian that she needs to have bloodwork drawn today and repeat in 2-3 days for Bhcg.  She verbalizes understanding.  I will call her with results.

## 2024-08-19 ENCOUNTER — APPOINTMENT (OUTPATIENT)
Dept: LAB | Facility: HOSPITAL | Age: 34
End: 2024-08-19
Payer: COMMERCIAL

## 2024-08-19 ENCOUNTER — TELEPHONE (OUTPATIENT)
Dept: OBGYN CLINIC | Facility: CLINIC | Age: 34
End: 2024-08-19

## 2024-08-19 DIAGNOSIS — Z30.09 UNWANTED FERTILITY: ICD-10-CM

## 2024-08-19 LAB — B-HCG SERPL-ACNC: 0.6 MIU/ML (ref 0–5)

## 2024-08-19 PROCEDURE — 36415 COLL VENOUS BLD VENIPUNCTURE: CPT

## 2024-08-19 PROCEDURE — 84702 CHORIONIC GONADOTROPIN TEST: CPT

## 2024-08-19 NOTE — TELEPHONE ENCOUNTER
Please contact patient in Danish and advise her that serum pregnancy test was negative.  No need to repeat it again.  She should have started birth control yesterday.

## 2024-09-12 ENCOUNTER — TELEPHONE (OUTPATIENT)
Dept: OBGYN CLINIC | Facility: CLINIC | Age: 34
End: 2024-09-12

## 2024-09-26 ENCOUNTER — OFFICE VISIT (OUTPATIENT)
Dept: OBGYN CLINIC | Facility: CLINIC | Age: 34
End: 2024-09-26

## 2024-09-26 VITALS
SYSTOLIC BLOOD PRESSURE: 131 MMHG | WEIGHT: 233.4 LBS | DIASTOLIC BLOOD PRESSURE: 85 MMHG | HEART RATE: 72 BPM | BODY MASS INDEX: 42.69 KG/M2

## 2024-09-26 DIAGNOSIS — M54.9 BACK PAIN, UNSPECIFIED BACK LOCATION, UNSPECIFIED BACK PAIN LATERALITY, UNSPECIFIED CHRONICITY: Primary | ICD-10-CM

## 2024-09-26 DIAGNOSIS — N62 LARGE BREASTS: ICD-10-CM

## 2024-09-26 PROCEDURE — NC001 PR NO CHARGE: Performed by: NURSE PRACTITIONER

## 2024-09-26 NOTE — PROGRESS NOTES
Adina Carvalho presents today with c/o back pain from large, pendulous breasts.  She desires surgical breast reduction.  Advised her to work with her PCP as they may recommend PT or other therapies first prior to surgical intervention. For back pain.  She agrees.

## 2024-10-10 ENCOUNTER — TELEPHONE (OUTPATIENT)
Age: 34
End: 2024-10-10

## 2024-10-10 ENCOUNTER — OFFICE VISIT (OUTPATIENT)
Dept: FAMILY MEDICINE CLINIC | Facility: CLINIC | Age: 34
End: 2024-10-10

## 2024-10-10 VITALS
OXYGEN SATURATION: 99 % | RESPIRATION RATE: 16 BRPM | HEIGHT: 62 IN | HEART RATE: 79 BPM | DIASTOLIC BLOOD PRESSURE: 90 MMHG | BODY MASS INDEX: 42.88 KG/M2 | WEIGHT: 233 LBS | TEMPERATURE: 98 F | SYSTOLIC BLOOD PRESSURE: 130 MMHG

## 2024-10-10 DIAGNOSIS — Z91.89 NEED FOR DENTAL CARE: ICD-10-CM

## 2024-10-10 DIAGNOSIS — E66.01 CLASS 3 SEVERE OBESITY WITHOUT SERIOUS COMORBIDITY WITH BODY MASS INDEX (BMI) OF 40.0 TO 44.9 IN ADULT, UNSPECIFIED OBESITY TYPE (HCC): Primary | ICD-10-CM

## 2024-10-10 DIAGNOSIS — G89.29 CHRONIC BILATERAL LOW BACK PAIN WITH RIGHT-SIDED SCIATICA: ICD-10-CM

## 2024-10-10 DIAGNOSIS — N62 MACROMASTIA: ICD-10-CM

## 2024-10-10 DIAGNOSIS — J45.30 MILD PERSISTENT ASTHMA WITHOUT COMPLICATION: ICD-10-CM

## 2024-10-10 DIAGNOSIS — E66.813 CLASS 3 SEVERE OBESITY WITHOUT SERIOUS COMORBIDITY WITH BODY MASS INDEX (BMI) OF 40.0 TO 44.9 IN ADULT, UNSPECIFIED OBESITY TYPE (HCC): Primary | ICD-10-CM

## 2024-10-10 DIAGNOSIS — Z00.00 ANNUAL PHYSICAL EXAM: ICD-10-CM

## 2024-10-10 DIAGNOSIS — G89.29 CHRONIC BACK PAIN GREATER THAN 3 MONTHS DURATION: ICD-10-CM

## 2024-10-10 DIAGNOSIS — M54.9 CHRONIC BACK PAIN GREATER THAN 3 MONTHS DURATION: ICD-10-CM

## 2024-10-10 DIAGNOSIS — M54.41 CHRONIC BILATERAL LOW BACK PAIN WITH RIGHT-SIDED SCIATICA: ICD-10-CM

## 2024-10-10 PROCEDURE — 99385 PREV VISIT NEW AGE 18-39: CPT | Performed by: NURSE PRACTITIONER

## 2024-10-10 RX ORDER — IBUPROFEN 600 MG/1
600 TABLET, FILM COATED ORAL EVERY 6 HOURS
Qty: 30 TABLET | Refills: 0 | Status: SHIPPED | OUTPATIENT
Start: 2024-10-10

## 2024-10-10 RX ORDER — CLINDAMYCIN HCL 300 MG
CAPSULE ORAL
COMMUNITY
Start: 2024-08-29

## 2024-10-10 RX ORDER — FLUTICASONE PROPIONATE 220 UG/1
2 AEROSOL, METERED RESPIRATORY (INHALATION) 2 TIMES DAILY
Qty: 12 G | Refills: 5 | Status: SHIPPED | OUTPATIENT
Start: 2024-10-10

## 2024-10-10 RX ORDER — ALBUTEROL SULFATE 90 UG/1
2 INHALANT RESPIRATORY (INHALATION) EVERY 6 HOURS PRN
Qty: 8.5 G | Refills: 2 | Status: SHIPPED | OUTPATIENT
Start: 2024-10-10

## 2024-10-10 NOTE — TELEPHONE ENCOUNTER
Rec'd call from patient requesting to schedule a consult for a breast reduction. I explained the process of Anu reaching out to her via email with the list of criteria needed to submit to insurance for coverage. Patient verbalized understanding.    Please contact patient at your earliest convenience. Patient's email address is [email or web address] Thank you.

## 2024-10-10 NOTE — PROGRESS NOTES
Adult Annual Physical  Name: Adina Carvalho      : 1990      MRN: [id number]  Encounter Provider: TAPAN Reyes  Encounter Date: 10/10/2024   Encounter department: [facility]    Assessment & Plan  Class 3 severe obesity without serious comorbidity with body mass index (BMI) of 40.0 to 44.9 in adult, unspecified obesity type (HCC)      Orders:    CBC and differential; Future    Comprehensive metabolic panel; Future    Hemoglobin A1C; Future    Lipid panel; Future    TSH, 3rd generation with Free T4 reflex; Future    Annual physical exam         Chronic bilateral low back pain with right-sided sciatica  Reports low back pain w/ right LE radiation >1 year   No injury, states since epidural during first delivery   Recommend PT, OTC analgesics     Orders:    XR spine lumbar minimum 4 views non injury; Future    Ambulatory Referral to Physical Therapy; Future    ibuprofen (MOTRIN) 600 mg tablet; Take 1 tablet (600 mg total) by mouth every 6 (six) hours    Mild persistent asthma without complication  Stable, continue with current regimen     Orders:    fluticasone (Flovent HFA) 220 mcg/act inhaler; Inhale 2 puffs 2 (two) times a day Rinse mouth after use.    albuterol (ProAir HFA) 90 mcg/act inhaler; Inhale 2 puffs every 6 (six) hours as needed for wheezing    Need for dental care    Orders:    Ambulatory referral to [facility]; Future    Chronic back pain greater than 3 months duration    Orders:    Ambulatory Referral to Plastic Surgery; Future    Macromastia  Patient requests referral to Plastics for consult re: breast reduction   Discussed that typically patient must have tried and failed medications and PT before this can be considered an option     Orders:    Ambulatory Referral to Plastic Surgery; Future    Immunizations and preventive care screenings were discussed with patient today. Appropriate education was printed on patient's after  visit summary.    Counseling:  Alcohol/drug use: discussed moderation in alcohol intake, the recommendations for healthy alcohol use, and avoidance of illicit drug use.  Dental Health: discussed importance of regular tooth brushing, flossing, and dental visits.  Injury prevention: discussed safety/seat belts, safety helmets, smoke detectors, carbon monoxide detectors, and smoking near bedding or upholstery.  Sexual health: discussed sexually transmitted diseases, partner selection, use of condoms, avoidance of unintended pregnancy, and contraceptive alternatives.  Exercise: the importance of regular exercise/physical activity was discussed. Recommend exercise 3-5 times per week for at least 30 minutes.     BMI Counseling: Body mass index is 42.62 kg/m². The BMI is above normal. Nutrition recommendations include decreasing portion sizes, encouraging healthy choices of fruits and vegetables, decreasing fast food intake, consuming healthier snacks and limiting drinks that contain sugar. Exercise recommendations include exercising 3-5 times per week. Rationale for BMI follow-up plan is due to patient being overweight or obese.     Depression Screening and Follow-up Plan: Patient was screened for depression during today's encounter. They screened negative with a PHQ-2 score of 0.        History of Present Illness     Adult Annual Physical:  Patient presents for annual physical. Patient is a 35 YO female who presents today to establish care and for physical.     Reports low back pain since receiving first epidural one year ago. Reports right after pregnancy became pregnant again and recently delivered 2nd baby in June. Denies numbness/ tingling, incontinence, falls, weakness, fever, saddle anesthesia.       The following portions of the patient's history were reviewed and updated as appropriate: allergies, current medications, past family history, past medical history, past social history, past surgical history and  problem list.  .     Diet and Physical Activity:  - Diet/Nutrition: limited junk food.  - Exercise: no formal exercise.    Depression Screening:  - PHQ-2 Score: 0    General Health:  - Sleep: sleeps well.  - Hearing: normal hearing right ear and normal hearing left ear.  - Vision: no vision problems.  - Dental: regular dental visits.    /GYN Health:  - Follows with GYN: yes.   - Menopause: premenopausal.     Review of Systems   Constitutional:  Negative for activity change, appetite change, chills, fatigue, fever and unexpected weight change.   HENT:  Negative for hearing loss, nosebleeds, sinus pain, sneezing, sore throat and trouble swallowing.    Eyes:  Negative for photophobia and visual disturbance.   Respiratory:  Negative for cough, chest tightness, shortness of breath and wheezing.    Cardiovascular:  Negative for chest pain, palpitations and leg swelling.   Gastrointestinal:  Negative for abdominal pain, constipation, nausea and vomiting.   Genitourinary:  Negative for decreased urine volume, difficulty urinating, dysuria, flank pain, genital sores, hematuria and urgency.   Musculoskeletal:  Positive for arthralgias, back pain and myalgias. Negative for gait problem.   Skin:  Negative for pallor, rash and wound.   Neurological:  Negative for dizziness, seizures, syncope, weakness, numbness and headaches.   Hematological:  Negative for adenopathy. Does not bruise/bleed easily.   Psychiatric/Behavioral:  Negative for confusion, hallucinations, self-injury, sleep disturbance and suicidal ideas. The patient is not nervous/anxious.      Current Outpatient Medications on File Prior to Visit   Medication Sig Dispense Refill    clindamycin (CLEOCIN) 300 MG capsule take 1 capsule by mouth twice a day for 7 days      ferrous sulfate 324 (65 Fe) mg Take 1 tablet (324 mg total) by mouth daily before breakfast 30 tablet 5    hydrocortisone 1 % cream Apply 1 Application topically daily as needed for irritation       "norgestimate-ethinyl estradiol (Sprintec 28) 0.25-35 MG-MCG per tablet Take 1 tablet by mouth daily 28 tablet 3    [DISCONTINUED] albuterol (ProAir HFA) 90 mcg/act inhaler Inhale 2 puffs every 6 (six) hours as needed for wheezing 8.5 g 2    [DISCONTINUED] fluticasone (Flovent HFA) 220 mcg/act inhaler Inhale 2 puffs 2 (two) times a day Rinse mouth after use. 12 g 5    [DISCONTINUED] ibuprofen (MOTRIN) 600 mg tablet Take 1 tablet (600 mg total) by mouth every 6 (six) hours 30 tablet 0    [DISCONTINUED] Prenatal Vit-Fe Fumarate-FA (Prenatal Plus Vitamin/Mineral) 27-1 MG TABS Take 1 tablet by mouth in the morning (Patient not taking: Reported on 9/26/2024) 90 tablet 4     No current facility-administered medications on file prior to visit.        Objective     /90 (BP Location: Right arm, Patient Position: Sitting, Cuff Size: Standard)   Pulse 79   Temp 98 °F (36.7 °C) (Temporal)   Resp 16   Ht 5' 2\" (1.575 m)   Wt 106 kg (233 lb)   LMP 09/12/2024 (Approximate)   SpO2 99%   BMI 42.62 kg/m²     Physical Exam  Vitals and nursing note reviewed.   Constitutional:       General: She is not in acute distress.     Appearance: Normal appearance. She is not diaphoretic.   HENT:      Head: Normocephalic.      Right Ear: External ear normal.      Left Ear: External ear normal.      Nose: Nose normal.      Mouth/Throat:      Mouth: Mucous membranes are moist.   Eyes:      General:         Right eye: No discharge.         Left eye: No discharge.      Extraocular Movements: Extraocular movements intact.      Conjunctiva/sclera: Conjunctivae normal.      Pupils: Pupils are equal, round, and reactive to light.   Cardiovascular:      Rate and Rhythm: Normal rate and regular rhythm.   Pulmonary:      Effort: Pulmonary effort is normal. No respiratory distress.      Breath sounds: Normal breath sounds.   Abdominal:      General: Bowel sounds are normal. There is no distension.      Palpations: Abdomen is soft. "   Musculoskeletal:         General: Normal range of motion.      Cervical back: Normal range of motion and neck supple. No rigidity.      Lumbar back: Tenderness present. No bony tenderness. Normal range of motion. Negative right straight leg raise test and negative left straight leg raise test.      Right lower leg: No edema.      Left lower leg: No edema.   Lymphadenopathy:      Cervical: No cervical adenopathy.   Skin:     General: Skin is warm and dry.      Capillary Refill: Capillary refill takes less than 2 seconds.      Findings: No rash.   Neurological:      General: No focal deficit present.      Mental Status: She is alert and oriented to person, place, and time.   Psychiatric:         Mood and Affect: Mood normal.         Behavior: Behavior normal.

## 2024-10-10 NOTE — ASSESSMENT & PLAN NOTE
Orders:    CBC and differential; Future    Comprehensive metabolic panel; Future    Hemoglobin A1C; Future    Lipid panel; Future    TSH, 3rd generation with Free T4 reflex; Future

## 2024-10-10 NOTE — PATIENT INSTRUCTIONS
"Patient Education     Ejercicios para la espalda   Conceptos Básicos   Redactado por los médicos y editores de UpToDate   ¿Por qué necesito hacer ejercicios para la espalda? -- Los ejercicios para la espalda pueden ayudar a aliviar el dolor de espalda y podrían ayudar a prevenir indira de espalda futuros. A bandar plazo, es importante fortalecer los músculos de la parte baja de la espalda, las nalgas y el área del estómago. Estos son leoncio \"músculos troncales\". Los ejercicios de estiramiento también son importantes para mantener los músculos flexibles.  A continuación se muestran algunos ejercicios de estiramiento y fortalecimiento que podrían ayudarle. Otras formas de movimiento también pueden ayudar a aliviar o prevenir el dolor de espalda. Por ejemplo, a algunas personas les gusta caminar, hacer ejercicio aeróbico o hacer yoga o shay chi. Lo más importante es  el cuerpo. Smith médico, enfermero o fisioterapeuta puede ayudarle a encontrar diferentes tipos de actividad que le edy berna.  ¿Qué ejercicios de estiramiento lashonda hacer? -- A continuación se muestran algunos ejemplos de ejercicios de estiramiento. [place] leoncio músculos antes de estirar para ayudar a prevenir lesiones. Para calentar, puede caminar, trotar o andar en bicicleta jeremy unos minutos.  Comience repitiendo cada nuno de estos estiramientos de 2 a 3 veces. Intente mantener cada estiramiento jeremy 5 a 10 segundos y trate de hacer los estiramientos 2 a 3 veces al día. Respire lenta y profundamente mientras hace los ejercicios. Nunca rebote al hacer estiramientos.   Estiramiento eric-thalia (figura 1) - Comience a cuatro patas en el suelo, con las reji debajo de los hombros, las rodillas debajo de las caderas y la espalda plana. Artie, meta la barbilla y contraiga los músculos del estómago mientras curva la espalda (olimpia un \"eric\"). Mantenga el estiramiento jeremy unos 10 segundos. Descanse unos segundos mientras aplana la espalda. Luego, levante " "la barbilla y deje que el vientre y la espalda baja se hundan hacia el suelo (olimpia radha \"thalia\"). Mantenga el estiramiento jeremy unos 10 segundos.   Estiramientos simples de rodilla a pecho (figura 2) ? Mientras se acuesta boca arriba, flexione las rodillas con los pies apoyados en el suelo. Tire de radha rodilla hacia el pecho hasta que sienta un estiramiento en la parte baja de la espalda y en el área de los glúteos. Baje y repita con la otra rodilla. Si tiene problemas de rodilla, levante la rodilla agarrando la parte posterior del muslo en lugar de la parte delantera de la rodilla. También puede hacer lilly ejercicio agarrando ambas rodillas al mismo tiempo.   Rotaciones del tronco inferior (figura 3) ? Mientras se acuesta boca arriba, flexione las rodillas con los pies apoyados en el suelo. Mantenga las rodillas y los tobillos juntos y luego déjelos caer hacia un lado. Mantenga ambos hombros tocando el suelo hasta que sienta un estiramiento en los músculos laterales de la espalda. Repita en el otro lado.   Estiramientos de la espalda baja sentado (figura 4) - Siéntese en radha silla con los pies separados aproximadamente al ancho de los hombros. Luego, inclínese hacia adelante hasta que sienta un estiramiento en la radha lumbar.  ¿Qué ejercicios de fortalecimiento lashonda hacer? -- A continuación se muestran algunos ejemplos de ejercicios de fortalecimiento.  Empiece por hacer cada ejercicio 2 o 3 veces. Trabaje hasta hacer cada ejercicio 10 veces. Mantenga cada ejercicio jeremy 3 a 5 segundos e intente realizarlos 2 a 3 veces al día. Glenna todos los ejercicios lentamente.   Apretones del omóplato (figura 5) - Junte los omóplatos en la parte superior de la espalda y manténgalo así jeremy 3 a 5 segundos. También puede hacer estos 1 lado a la vez. Siéntese con buena postura y asegúrese de que leoncio hombros no se levanten cuando glenna lilly ejercicio. Relaje.   Inclinaciones pélvicas (figura 6) ? Acuéstese boca arriba con las " "rodillas flexionadas y los pies apoyados en el suelo. Apriete los músculos del abdomen y presione la parte baja de la espalda hacia el suelo. Relaje. Debería poder respirar cómodamente jeremy lilly ejercicio.   Levantamientos de cadera (figura 7) ? Acuéstese boca arriba con las rodillas flexionadas y los pies apoyados en el suelo. Apriete los músculos del abdomen, mantenga la espalda plana y levante las nalgas del suelo. Relaje. Debería sentir esto en leoncio nalgas, no en ricketts espalda baja.  ¿Qué más lashonda saber?    El ejercicio, incluido el estiramiento, puede resultar un poco incómodo, daryl no debe sentir dolor sharona o intenso. Si siente un dolor intenso, deje de hacer lo que está haciendo. Si el dolor intenso continúa, llame a ricketts médico o enfermero.   No contenga la respiración cuando glenna ejercicio. Si tiende a contener la respiración, intente contar en voz nayana cuando glenna ejercicio. Si algún ejercicio le molesta, deténgase de inmediato.   Siempre entre en calor antes de hacer ejercicio. Los músculos calientes se estiran mucho más fácilmente que los músculos fríos. Estirar los músculos fríos puede provocar lesiones.   Hacer ejercicios antes de radha comida puede ser radha buena forma de adoptar radha rutina.  Todos los artículos se actualizan a medida que se descubre nueva evidencia y culmina nuestro proceso de evaluación por homólogos   Lilly artículo se recuperó de UpToDate el: Apr 03, 2024.  Artículo [id number] Versión 2.0.es-419.1  Release: 32.2.4 - C32.92  © 2024 UpToDate, Inc. Todos los derechos reservados.  figura 1: Estiramiento eric-thalia     Comience a cuatro patas en el suelo, con las reji debajo de los hombros, las rodillas debajo de las caderas y la espalda plana. Artie, meta la barbilla y contraiga los músculos del estómago mientras curva la espalda (olimpia un \"eric\"). Mantenga el estiramiento jeremy unos 10 segundos. Descanse unos segundos mientras aplana la espalda. Luego, levante la barbilla y deje que el " "vientre y la espalda baja se hundan hacia el suelo (olimpia radha \"thalia\"). Mantenga el estiramiento jeremy unos 10 segundos.  Gráfico [id number] Versión 1.0  figura 2: Estiramiento simple de rodilla a pecho     Acuéstese boca arriba con las rodillas dobladas y apoye los pies en el suelo. Tire de radha rodilla hacia el pecho hasta que sienta un estiramiento en la parte baja de la espalda y en el área de los glúteos. Repita con la otra rodilla. Si tiene problemas de rodilla, levante la rodilla agarrando la parte posterior del muslo en lugar de la parte delantera de la rodilla. También puede hacer lilly ejercicio agarrando ambas rodillas al mismo tiempo.  Gráfico [id number] Versión 1.0  figura 3: Rotación del tronco inferior     Mientras se acuesta boca arriba, doble las rodillas y apoye los pies en el suelo. Mantenga las piernas juntas y luego déjelas caer hacia un lado. Mantenga ambos hombros tocando el suelo hasta que sienta un estiramiento en los músculos laterales de la espalda. Repita en el otro lado.  Gráfico [id number] Versión 1.0  figura 4: Estiramiento de la espalda baja     Siéntese en radha silla con los pies separados aproximadamente al ancho de los hombros. Luego, inclínese hacia adelante hasta que sienta un estiramiento en la radha lumbar.  Gráfico [id number] Versión 1.0  figura 5: Apretones del omóplato     Junte los omóplatos en la parte superior de la espalda y manténgalo así jeremy 3 a 5 segundos. Asegúrese de estar sentado con radha buena postura y de que leoncio hombros no se levanten cuando glenna lilly ejercicio. Relaje.  Gráfico [id number] Versión 1.0  figura 6: Inclinaciones pélvicas     Acuéstese boca arriba con las rodillas dobladas y los pies apoyados en el suelo. Apriete los músculos del estómago y presione la parte baja de la espalda hacia el suelo. Relaje.  Gráfico [id number] Versión 1.0  figura 7: Levantamientos de cadera     Acuéstese boca arriba con las rodillas dobladas y los pies apoyados en el suelo. Apriete los músculos " del estómago y levante las nalgas del suelo. Relaje.  Gráfico [id number] Versión 1.0  Exención de responsabilidad y uso de la información del consumidor   Descargo de responsabilidad: esta información generalizada es un resumen limitado de información sobre el diagnóstico, el tratamiento y/o los medicamentos. No pretende ser exhaustiva y se debe utilizar olimpia herramienta para ayudar al usuario a comprender y/o evaluar las posibles opciones de diagnóstico y tratamiento. No incluye toda la información sobre afecciones, tratamientos, medicamentos, efectos secundarios o riesgos puedan ser aplicables a un paciente específico. No tiene el propósito de servir olimpia recomendación médica ni de sustituir la recomendación médica, el diagnóstico o el tratamiento de un profesional de atención médica que se base en el examen y la evaluación de lilly profesional de la germán respecto a las circunstancias específicas y únicas del paciente. Los pacientes deben hablar con un profesional de atención médica para obtener información completa sobre ricketts germán, cuestiones médicas y opciones de tratamiento, incluidos los riesgos o los beneficios relacionados con el uso de medicamentos. Esta información no certifica que los tratamientos o medicamentos felix seguros, eficaces o estén aprobados para tratar a un paciente específico. UpToDate, Inc. y leoncio afiliados renuncian a cualquier garantía o responsabilidad relacionada con esta información o el uso de la misma.El uso de esta información está sujeto a las Condiciones de uso, disponibles en https://www.DeliveryEdgeuwer.com/en/know/clinical-effectiveness-terms. 2024© [employer] y leoncio afiliados y/o licenciantes. Todos los derechos reservados.  Copyright   © 2024 UpToDate, Inc. Todos los derechos reservados.

## 2024-10-14 ENCOUNTER — TELEPHONE (OUTPATIENT)
Dept: PLASTIC SURGERY | Facility: CLINIC | Age: 34
End: 2024-10-14

## 2024-10-14 NOTE — TELEPHONE ENCOUNTER
Emailed patient Iranian version of criteria for breast reduction consultation as we need all to be met.

## 2024-10-14 NOTE — TELEPHONE ENCOUNTER
Patient (Brazilian speaking )called looking to be transferred to [facility] .   Spoke with Anu ; she will call her back With the  line . Patient verbally admitted to understand .

## 2024-10-17 ENCOUNTER — TELEPHONE (OUTPATIENT)
Dept: PLASTIC SURGERY | Facility: CLINIC | Age: 34
End: 2024-10-17

## 2024-10-17 NOTE — TELEPHONE ENCOUNTER
Rec'd call from patient. Utilized language line for call.    Patient states that she called 2 weeks ago and hasn't yet had response. She states that she had a missed call from today. I explained that was Anu, our patient care coordinator call her and was unable to leave  message. Anu currently unavailable.    Told patient that Anu would call her back at her earliest convenience. Patient states that would it even be worth it - she's already waited 2 weeks. This was taking too long. Reminded patient that it was one week today that she originally contacted the office. I stated that Anu e-mailed her criteria in Yakut on Monday - 2 business days after her call. Patient states that she's been waiting since Monday and hasn't received anything.     I again informed patient that I would have Anu return her call when she could.    Patient verbalized understanding.

## 2024-10-21 ENCOUNTER — TELEPHONE (OUTPATIENT)
Dept: PLASTIC SURGERY | Facility: CLINIC | Age: 34
End: 2024-10-21

## 2024-10-21 NOTE — TELEPHONE ENCOUNTER
Attempted to call patient about breast reduction criteria and patient's voicemail is still not set up so unable to leave message.

## 2024-10-22 ENCOUNTER — TELEPHONE (OUTPATIENT)
Dept: OBGYN CLINIC | Facility: CLINIC | Age: 34
End: 2024-10-22

## 2024-10-22 ENCOUNTER — TELEPHONE (OUTPATIENT)
Dept: PLASTIC SURGERY | Facility: CLINIC | Age: 34
End: 2024-10-22

## 2024-10-22 NOTE — TELEPHONE ENCOUNTER
Resent criteria for breast reduction to new email address Jeanne is reaching out to patient to review criteria as patient is Martiniquais speaking.    I just got notification that email address is not valid

## 2024-10-22 NOTE — TELEPHONE ENCOUNTER
Spoke with pt regarding breast reduction criteria. Criteria was reviewed and pt needs all 6 months of documentation for a consult. Pt upset she is currently in pain and has to collect 6 months of documentation.She expressed she did not want to wait that long. I let her know if she still wanted to proceed through insurance, she would need to call her insurance and have a note written that they will cover her surgery entirely w/o 6 months of documentation. Pt verbalized understanding and hung up. Pt also verified email address to send requirements. Email came back as undeliverable.

## 2024-10-22 NOTE — TELEPHONE ENCOUNTER
Attempted to call patient to review criteria. No vm set up to leave a message. [facility], thank you.

## 2024-10-22 NOTE — TELEPHONE ENCOUNTER
Received call from patient who says she has not received information regarding her breast surgery from Anu. I informed patient that Anu was unable to leave voice mail.     Patient verbalized that she did not get any phone calls. Patient also provided a new email to email the information to. She also verbalized she has been having pain for a month in her back while waiting to hear regarding her surgery.    I verified phone number is correct.     Phone: [phone]    New Email: [email or web address]    Anu BAIRES: Please contact patient back.     ADDENDUM: Had attempted multiple times to verify email on Turkish Language Call with .

## 2024-10-22 NOTE — TELEPHONE ENCOUNTER
Patient called because she would like to speak to a provider because at the plastic surgery consultation she had she was told she needs at least 6 months of notes before getting breast surgery.

## 2024-10-23 ENCOUNTER — EVALUATION (OUTPATIENT)
Dept: PHYSICAL THERAPY | Facility: CLINIC | Age: 34
End: 2024-10-23
Payer: COMMERCIAL

## 2024-10-23 DIAGNOSIS — G89.29 CHRONIC BILATERAL LOW BACK PAIN WITH RIGHT-SIDED SCIATICA: Primary | ICD-10-CM

## 2024-10-23 DIAGNOSIS — M54.41 CHRONIC BILATERAL LOW BACK PAIN WITH RIGHT-SIDED SCIATICA: Primary | ICD-10-CM

## 2024-10-23 PROCEDURE — 97110 THERAPEUTIC EXERCISES: CPT

## 2024-10-23 PROCEDURE — 97162 PT EVAL MOD COMPLEX 30 MIN: CPT

## 2024-10-23 NOTE — PROGRESS NOTES
PT Evaluation     Today's date: 10/23/2024  Patient name: Adina Carvalho  : 1990  MRN: [id number]  Referring provider: Marily Blair, *  Dx:   Encounter Diagnosis     ICD-10-CM    1. Chronic bilateral low back pain with right-sided sciatica  M54.41 Ambulatory Referral to Physical Therapy    G89.29           Start Time: 1122  Stop Time: 1209  Total time in clinic (min): 47 minutes    Assessment  Impairments: abnormal gait, abnormal muscle firing, abnormal or restricted ROM, activity intolerance, impaired physical strength, lacks appropriate home exercise program, pain with function, weight-bearing intolerance, poor body mechanics and activity limitations  Symptom irritability: high    Assessment details: Pt is a 34 y.o. year old female presenting to physical therapy for Chronic bilateral low back pain with right-sided sciatica. She presents with the following impairments: Pain with all lumbar ROM, TTP over spinous processes/erectors l/s, (+) slump test R, apprehension to any lumbar movement, unable to adapt prone/supine, affecting her function with walking, bending, squatting, carrying, caring for children, recreational activities, ADLs.  Pt will benefit from skilled physical therapy to address functional limitations noted in evaluation and meet patient goals. Pt presents with S+S of mechanical LBP with subsequent nerve pain down to the area of the R calf. Pt's most severe pain is over the low back. Pt is very sensitive to any lumbar movement or touch. Pt has more LBP when going into extension. Beginning of therapy will consist of symptom modulation before more dynamic exercises can be tolerated. Will work towards more lumbosacral mobility in the coming weeks.  Understanding of Dx/Px/POC: good     Prognosis: good    Goals  ST. Pt will be independent with HEP.  2. Pt will improve lumbar extension mobility deficits to WNL with 5/10 pain  3. Pt will do 3x10 mini squats with no more than 5/10  pain in the lumbar spine.  LT. Pt will express no pain in the lumbar paraspinals/spinous processes with palpation by dc.  2. Pt will experience full centralization of radicular symptoms.   3.  Pt will tolerate 3x10 bridges with no additional pain.  4. Pt will achieve predicted FOTO score.     Plan  Patient would benefit from: PT eval and skilled physical therapy  Planned modality interventions: biofeedback, manual electrical stimulation, microcurrent electrical stimulation, TENS, electrical stimulation/Russian stimulation, thermotherapy: hydrocollator packs, cryotherapy and unattended electrical stimulation    Planned therapy interventions: abdominal trunk stabilization, joint mobilization, manual therapy, massage, ADL retraining, neuromuscular re-education, body mechanics training, patient education, postural training, strengthening, stretching, therapeutic activities, therapeutic exercise, flexibility, functional ROM exercises and home exercise program    Frequency: 2x week  Duration in weeks: 6  Treatment plan discussed with: patient        Subjective Evaluation    History of Present Illness  Mechanism of injury: Pt presents to the clinic with reports of BL LBP with R sided sciatica that started around a year ago after her first pregnancy. Pt states the pain goes down to the R calf area. Pt states the back pain is always there and is worse than the leg pain. No RICK other than pregnancy. No reported sensation or strength differences in the legs. States bending over gives her the most pain. States going form sitting to standing hurts the most. Pt states that he pain is stabbing in nature. No bowel or bladder issues. States she sleeps in a recliner at night. All red flag symptoms cleared.   Patient Goals  Patient goal: Less pain and more function  Pain  Current pain rating: 10  At best pain rating: 10  At worst pain rating: 10  Quality: sharp  Alleviating factors: no change in symptoms.  Progression:  worsening      Diagnostic Tests    FCE comments: Future xray      Objective     Postural Observations  Seated posture: fair  Standing posture: poor      Palpation   Left   Tenderness of the erector spinae, lumbar paraspinals and quadratus lumborum.     Right   Tenderness of the erector spinae, lumbar paraspinals and quadratus lumborum.     Tenderness     Lumbar Spine  Tenderness in the spinous process.     Neurological Testing     Sensation     Lumbar   Left   Intact: light touch    Right   Intact: light touch    Reflexes   Left   Patellar (L4): normal (2+)  Achilles (S1): normal (2+)  Babinski sign: negative  Clonus sign: negative    Right   Patellar (L4): trace (1+)  Achilles (S1): trace (1+)  Babinski sign: positive  Clonus sign: negative    Additional Neurological Details  R babinski (+) for a couple of tests then negative.     Active Range of Motion     Lumbar   Flexion:  with pain Restriction level: minimal  Extension:  with pain Restriction level: moderate  Left lateral flexion:  with pain Restriction level: minimal  Right lateral flexion:  with pain Restriction level: minimal    Additional Active Range of Motion Details  Pain worse when going into ext    Strength/Myotome Testing     Lumbar   Left   Heel walk: normal  Toe walk: normal    Right   Heel walk: normal  Toe walk: normal    Left Hip   Planes of Motion   Flexion: 4+  Abduction: 5  Adduction: 4+  External rotation: 4+  Internal rotation: 4+    Right Hip   Planes of Motion   Flexion: 4+  Abduction: 5  Adduction: 4+  External rotation: 4+  Internal rotation: 4+    Left Knee   Flexion: 4+  Extension: 4+    Right Knee   Flexion: 4+  Extension: 4+    Left Ankle/Foot   Dorsiflexion: 5  Plantar flexion: 5    Right Ankle/Foot   Dorsiflexion: 5  Plantar flexion: 5    Tests     Lumbar     Left   Negative slump test.     Right   Positive slump test.     Additional Tests Details  Hypersensitive to any touch over the lumbar spine/paraspinals    Ambulation      Comments   Slight trendelenburg lurch over the LLE. Slow gait speed without much arm swing.    General Comments:      Lumbar Comments  Pt can't tolerate laying face down or face up for further testing.       Flowsheet Rows      Flowsheet Row Most Recent Value   PT/OT G-Codes    Current Score 28   Projected Score 50               Precautions: Can't tolerate supine/prone, Malay speaking  Date 10/23            Visit # IE            FOTO IE             Re-eval IE                 Manuals 10/23            Erector STM                                                    Neuro Re-Ed 10/23            Nerve glides seated 3x10            Seated TA             Seated TA+march                                                                 Ther Ex 10/23            Ball roll out  3x10            Seated marches 3x20            Seated abduction 3x20            Leg press             Seated thoracic rotation             SKTC seated                                       Ther Activity 10/23            Sidestepping              Standing marches             Gait Training                                       Modalities

## 2024-10-24 NOTE — TELEPHONE ENCOUNTER
Please advise her to make an appointment with [facility] downstairs to establish care.  They can be her PCP.

## 2024-10-28 NOTE — PROGRESS NOTES
Daily Note     Today's date: 10/28/2024  Patient name: Adina Carvalho  : 1990  MRN: [id number]  Referring provider: Marily Blair, *  Dx: No diagnosis found.               Subjective: ***      Objective: See treatment diary below      Assessment: Adina Tolerated treatment well with appropriate muscle fatigue experienced with ex's. She is making steady gains towards goals. Required vc's t/o session for proper positioning with ex's. She would benefit from continued PT and compliance with HEP.        Plan: Continue per plan of care.      Precautions: Can't tolerate supine/prone, Irish speaking  Date 10/23            Visit # IE            FOTO IE             Re-eval IE                 Manuals 10/23            Erector STM                                                    Neuro Re-Ed 10/23            Nerve glides seated 3x10            Seated TA             Seated TA+march                                                                 Ther Ex 10/23            Ball roll out  3x10            Seated marches 3x20            Seated abduction 3x20            Leg press             Seated thoracic rotation             SKTC seated                                       Ther Activity 10/23            Sidestepping              Standing march             Gait Training                                       Modalities

## 2024-10-29 ENCOUNTER — OFFICE VISIT (OUTPATIENT)
Dept: PHYSICAL THERAPY | Facility: CLINIC | Age: 34
End: 2024-10-29
Payer: COMMERCIAL

## 2024-10-29 DIAGNOSIS — G89.29 CHRONIC BILATERAL LOW BACK PAIN WITH RIGHT-SIDED SCIATICA: Primary | ICD-10-CM

## 2024-10-29 DIAGNOSIS — M54.41 CHRONIC BILATERAL LOW BACK PAIN WITH RIGHT-SIDED SCIATICA: Primary | ICD-10-CM

## 2024-10-29 PROCEDURE — 97010 HOT OR COLD PACKS THERAPY: CPT

## 2024-10-29 PROCEDURE — 97110 THERAPEUTIC EXERCISES: CPT

## 2024-10-29 PROCEDURE — 97112 NEUROMUSCULAR REEDUCATION: CPT

## 2024-10-29 PROCEDURE — 97014 ELECTRIC STIMULATION THERAPY: CPT

## 2024-10-29 NOTE — PROGRESS NOTES
"Daily Note     Today's date: 10/29/2024  Patient name: Adina Carvalho  : 1990  MRN: [id number]  Referring provider: Marily Blair, *  Dx: No diagnosis found.               Subjective: Patient reports 9/10 LBP today with no improvement since initial eval.      Objective: See treatment diary below      Assessment: Patient started standing ext and abd with no increase in pain, minimal VC provided to improve form to reduce hip flexor and HS compensation. Pt started seated SKTC and lumbar roll today, exercises held due to increased LBP. Patient tolerated TENS and MHP well, reports minimal improvement in symptoms. Patient demonstrated fatigue post treatment, exhibited good technique with therapeutic exercises, and would benefit from continued PT      Plan: Continue per plan of care.  Progress treatment as tolerated.      Session completed by ERICKSON Flor under supervision from Gutierrez Nguyen DPT.      Precautions: Can't tolerate supine/prone, Luxembourgish speaking  Date 10/23 10/29           Visit # IE 2           FOTO IE             Re-eval IE                 Manuals 10/23 10/29           Erector STM                                                    Neuro Re-Ed 10/23 10/29           Nerve glides seated 3x10 20x ea           Seated TA  Standing TA red PB press 2x10            Seated TA+march             3 way hip  20x ea abd and ext                                                  Ther Ex 10/23 10/29           Ball roll out  3x10 3x10\" Pn! held           Seated marches 3x20 20x ea           Seated abduction 3x20 3x10  GTB           Leg press             Seated thoracic rotation             SKTC seated  held                                     Ther Activity 10/23 10/29           Sidestepping              Standing marches             Gait Training                                       Modalities             TENS and MHP  10' pre                             "

## 2024-10-31 ENCOUNTER — OFFICE VISIT (OUTPATIENT)
Dept: PHYSICAL THERAPY | Facility: CLINIC | Age: 34
End: 2024-10-31
Payer: COMMERCIAL

## 2024-10-31 DIAGNOSIS — G89.29 CHRONIC BILATERAL LOW BACK PAIN WITH RIGHT-SIDED SCIATICA: Primary | ICD-10-CM

## 2024-10-31 DIAGNOSIS — M54.41 CHRONIC BILATERAL LOW BACK PAIN WITH RIGHT-SIDED SCIATICA: Primary | ICD-10-CM

## 2024-10-31 PROCEDURE — 97110 THERAPEUTIC EXERCISES: CPT

## 2024-10-31 NOTE — PROGRESS NOTES
"Daily Note     Today's date: 10/31/2024  Patient name: Adina Carvalho  : 1990  MRN: [id number]  Referring provider: Marily Blair, *  Dx:   Encounter Diagnosis     ICD-10-CM    1. Chronic bilateral low back pain with right-sided sciatica  M54.41     G89.29                      Subjective: Pt reports her back is very painful today  rates her pain 9/10      Objective: See treatment diary below      Assessment: Pt reports discomfort with MHP after 5', poor tolerance for TE, progress as pt is able to tolerate. Pt reports her pain is not improved at all with any of the tx today.       Plan: Continue per plan of care.      Precautions: Can't tolerate supine/prone, Congolese speaking  Date 10/23 10/29 10/31          Visit # IE 2 3          FOTO IE             Re-eval IE                 Manuals 10/23 10/29 10/31          Erector STM                                                    Neuro Re-Ed 10/23 10/29 10/31          Nerve glides seated 3x10 20x ea           Seated TA  Standing TA red PB press 2x10  Standing TA red PB press 2x10           Seated TA+march             3 way hip  20x ea abd and ext Abd/ext x20 ea          Seated hip add iso   5\"x20                                    Ther Ex 10/23 10/29 10/31          Ball roll out  3x10 3x10\" Pn! held           Seated marches 3x20 20x ea           Seated abduction 3x20 3x10  GTB 3x10 GTB          Leg press             Seated thoracic rotation             SKTC seated  held                                     Ther Activity 10/23 10/29           Sidestepping              Standing marches             Gait Training                                       Modalities             TENS and MHP  10' pre MHP 5' with TE                              "

## 2024-11-05 ENCOUNTER — APPOINTMENT (OUTPATIENT)
Dept: PHYSICAL THERAPY | Facility: CLINIC | Age: 34
End: 2024-11-05
Payer: COMMERCIAL

## 2024-11-06 ENCOUNTER — OFFICE VISIT (OUTPATIENT)
Dept: FAMILY MEDICINE CLINIC | Facility: CLINIC | Age: 34
End: 2024-11-06

## 2024-11-06 ENCOUNTER — APPOINTMENT (OUTPATIENT)
Dept: LAB | Facility: CLINIC | Age: 34
End: 2024-11-06
Payer: COMMERCIAL

## 2024-11-06 ENCOUNTER — OFFICE VISIT (OUTPATIENT)
Dept: PHYSICAL THERAPY | Facility: CLINIC | Age: 34
End: 2024-11-06
Payer: COMMERCIAL

## 2024-11-06 ENCOUNTER — HOSPITAL ENCOUNTER (OUTPATIENT)
Dept: RADIOLOGY | Facility: HOSPITAL | Age: 34
Discharge: HOME | End: 2024-11-06
Payer: COMMERCIAL

## 2024-11-06 VITALS
HEART RATE: 73 BPM | SYSTOLIC BLOOD PRESSURE: 120 MMHG | WEIGHT: 233.5 LBS | HEIGHT: 62 IN | DIASTOLIC BLOOD PRESSURE: 76 MMHG | BODY MASS INDEX: 42.97 KG/M2 | OXYGEN SATURATION: 99 % | TEMPERATURE: 98.3 F | RESPIRATION RATE: 18 BRPM

## 2024-11-06 DIAGNOSIS — E66.01 CLASS 3 SEVERE OBESITY WITHOUT SERIOUS COMORBIDITY WITH BODY MASS INDEX (BMI) OF 40.0 TO 44.9 IN ADULT, UNSPECIFIED OBESITY TYPE (HCC): ICD-10-CM

## 2024-11-06 DIAGNOSIS — M54.9 CHRONIC BACK PAIN GREATER THAN 3 MONTHS DURATION: Primary | ICD-10-CM

## 2024-11-06 DIAGNOSIS — E66.813 CLASS 3 SEVERE OBESITY WITHOUT SERIOUS COMORBIDITY WITH BODY MASS INDEX (BMI) OF 40.0 TO 44.9 IN ADULT, UNSPECIFIED OBESITY TYPE (HCC): ICD-10-CM

## 2024-11-06 DIAGNOSIS — M54.41 CHRONIC BILATERAL LOW BACK PAIN WITH RIGHT-SIDED SCIATICA: ICD-10-CM

## 2024-11-06 DIAGNOSIS — G89.29 CHRONIC BILATERAL LOW BACK PAIN WITH RIGHT-SIDED SCIATICA: Primary | ICD-10-CM

## 2024-11-06 DIAGNOSIS — G89.29 CHRONIC BILATERAL LOW BACK PAIN WITH RIGHT-SIDED SCIATICA: ICD-10-CM

## 2024-11-06 DIAGNOSIS — M54.41 CHRONIC BILATERAL LOW BACK PAIN WITH RIGHT-SIDED SCIATICA: Primary | ICD-10-CM

## 2024-11-06 DIAGNOSIS — G89.29 CHRONIC BACK PAIN GREATER THAN 3 MONTHS DURATION: Primary | ICD-10-CM

## 2024-11-06 LAB
ALBUMIN SERPL BCG-MCNC: 4.5 G/DL (ref 3.5–5)
ALP SERPL-CCNC: 79 U/L (ref 34–104)
ALT SERPL W P-5'-P-CCNC: 70 U/L (ref 7–52)
ANION GAP SERPL CALCULATED.3IONS-SCNC: 8 MMOL/L (ref 4–13)
AST SERPL W P-5'-P-CCNC: 44 U/L (ref 13–39)
BASOPHILS # BLD AUTO: 0.03 THOUSANDS/ÂΜL (ref 0–0.1)
BASOPHILS NFR BLD AUTO: 1 % (ref 0–1)
BILIRUB SERPL-MCNC: 0.57 MG/DL (ref 0.2–1)
BUN SERPL-MCNC: 11 MG/DL (ref 5–25)
CALCIUM SERPL-MCNC: 8.8 MG/DL (ref 8.4–10.2)
CHLORIDE SERPL-SCNC: 104 MMOL/L (ref 96–108)
CHOLEST SERPL-MCNC: 151 MG/DL
CO2 SERPL-SCNC: 25 MMOL/L (ref 21–32)
CREAT SERPL-MCNC: 0.85 MG/DL (ref 0.6–1.3)
EOSINOPHIL # BLD AUTO: 0.08 THOUSAND/ÂΜL (ref 0–0.61)
EOSINOPHIL NFR BLD AUTO: 3 % (ref 0–6)
ERYTHROCYTE [DISTWIDTH] IN BLOOD BY AUTOMATED COUNT: 13.5 % (ref 11.6–15.1)
GFR SERPL CREATININE-BSD FRML MDRD: 90 ML/MIN/1.73SQ M
GLUCOSE P FAST SERPL-MCNC: 68 MG/DL (ref 65–99)
HCT VFR BLD AUTO: 40.6 % (ref 34.8–46.1)
HDLC SERPL-MCNC: 54 MG/DL
HGB BLD-MCNC: 13.4 G/DL (ref 11.5–15.4)
IMM GRANULOCYTES # BLD AUTO: 0.01 THOUSAND/UL (ref 0–0.2)
IMM GRANULOCYTES NFR BLD AUTO: 0 % (ref 0–2)
LDLC SERPL CALC-MCNC: 85 MG/DL (ref 0–100)
LYMPHOCYTES # BLD AUTO: 1.22 THOUSANDS/ÂΜL (ref 0.6–4.47)
LYMPHOCYTES NFR BLD AUTO: 39 % (ref 14–44)
MCH RBC QN AUTO: 31 PG (ref 26.8–34.3)
MCHC RBC AUTO-ENTMCNC: 33 G/DL (ref 31.4–37.4)
MCV RBC AUTO: 94 FL (ref 82–98)
MONOCYTES # BLD AUTO: 0.28 THOUSAND/ÂΜL (ref 0.17–1.22)
MONOCYTES NFR BLD AUTO: 9 % (ref 4–12)
NEUTROPHILS # BLD AUTO: 1.55 THOUSANDS/ÂΜL (ref 1.85–7.62)
NEUTS SEG NFR BLD AUTO: 48 % (ref 43–75)
NONHDLC SERPL-MCNC: 97 MG/DL
NRBC BLD AUTO-RTO: 0 /100 WBCS
PLATELET # BLD AUTO: 209 THOUSANDS/UL (ref 149–390)
PMV BLD AUTO: 10.5 FL (ref 8.9–12.7)
POTASSIUM SERPL-SCNC: 3.8 MMOL/L (ref 3.5–5.3)
PROT SERPL-MCNC: 7.5 G/DL (ref 6.4–8.4)
RBC # BLD AUTO: 4.32 MILLION/UL (ref 3.81–5.12)
SODIUM SERPL-SCNC: 137 MMOL/L (ref 135–147)
T4 FREE SERPL-MCNC: 0.59 NG/DL (ref 0.61–1.12)
TRIGL SERPL-MCNC: 61 MG/DL
TSH SERPL DL<=0.05 MIU/L-ACNC: 12.78 UIU/ML (ref 0.45–4.5)
WBC # BLD AUTO: 3.17 THOUSAND/UL (ref 4.31–10.16)

## 2024-11-06 PROCEDURE — 36415 COLL VENOUS BLD VENIPUNCTURE: CPT

## 2024-11-06 PROCEDURE — 80053 COMPREHEN METABOLIC PANEL: CPT

## 2024-11-06 PROCEDURE — 99214 OFFICE O/P EST MOD 30 MIN: CPT | Performed by: NURSE PRACTITIONER

## 2024-11-06 PROCEDURE — 72110 X-RAY EXAM L-2 SPINE 4/>VWS: CPT

## 2024-11-06 PROCEDURE — 97110 THERAPEUTIC EXERCISES: CPT

## 2024-11-06 PROCEDURE — 85025 COMPLETE CBC W/AUTO DIFF WBC: CPT

## 2024-11-06 PROCEDURE — 84443 ASSAY THYROID STIM HORMONE: CPT

## 2024-11-06 PROCEDURE — 84439 ASSAY OF FREE THYROXINE: CPT

## 2024-11-06 PROCEDURE — 83036 HEMOGLOBIN GLYCOSYLATED A1C: CPT

## 2024-11-06 PROCEDURE — 80061 LIPID PANEL: CPT

## 2024-11-06 PROCEDURE — 97112 NEUROMUSCULAR REEDUCATION: CPT

## 2024-11-06 RX ORDER — LIDOCAINE 50 MG/G
1 PATCH TOPICAL DAILY
Qty: 30 PATCH | Refills: 0 | Status: SHIPPED | OUTPATIENT
Start: 2024-11-06

## 2024-11-06 RX ORDER — METHOCARBAMOL 500 MG/1
500 TABLET, FILM COATED ORAL 2 TIMES DAILY PRN
Qty: 40 TABLET | Refills: 0 | Status: SHIPPED | OUTPATIENT
Start: 2024-11-06

## 2024-11-06 NOTE — PROGRESS NOTES
Ambulatory Visit  Name: Adina Carvalho      : 1990      MRN: [id number]  Encounter Provider: TAPAN Reyes  Encounter Date: 2024   Encounter department: [facility]    Assessment & Plan  Chronic back pain greater than 3 months duration    Orders:    methocarbamol (ROBAXIN) 500 mg tablet; Take 1 tablet (500 mg total) by mouth 2 (two) times a day as needed for muscle spasms    lidocaine (Lidoderm) 5 %; Apply 1 patch topically over 12 hours daily Remove & Discard patch within 12 hours or as directed by MD       History of Present Illness     Patient is a 33 YO female with chronic mid and low back pain. She presents today for follow up. She states that pain has not improved and wants me to call insurance to let them know this. She has not completed imaging. She has completed 2 PT sessions only. She has not tried any medications. Denies numbness/ tingling, incontinence, falls, weakness, fever, saddle anesthesia.       The following portions of the patient's history were reviewed and updated as appropriate: allergies, current medications, past family history, past medical history, past social history, past surgical history and problem list.             Review of Systems   Constitutional:  Negative for chills and fever.   HENT:  Negative for ear pain and sore throat.    Eyes:  Negative for pain and visual disturbance.   Respiratory:  Negative for cough and shortness of breath.    Cardiovascular:  Negative for chest pain and palpitations.   Gastrointestinal:  Negative for abdominal pain and vomiting.   Genitourinary:  Negative for dysuria and hematuria.   Musculoskeletal:  Positive for arthralgias, back pain and myalgias.   Skin:  Negative for color change and rash.   Neurological:  Negative for seizures and syncope.   All other systems reviewed and are negative.          Objective     /76 (BP Location: Right arm, Patient Position: Sitting, Cuff Size:  "Large)   Pulse 73   Temp 98.3 °F (36.8 °C) (Temporal)   Resp 18   Ht 5' 2\" (1.575 m)   Wt 106 kg (233 lb 8 oz)   LMP  (LMP Unknown)   SpO2 99%   BMI 42.71 kg/m²     Physical Exam  Vitals and nursing note reviewed.   Constitutional:       General: She is not in acute distress.     Appearance: She is well-developed. She is obese.   HENT:      Head: Normocephalic and atraumatic.   Eyes:      Conjunctiva/sclera: Conjunctivae normal.   Cardiovascular:      Rate and Rhythm: Normal rate and regular rhythm.      Heart sounds: No murmur heard.  Pulmonary:      Effort: Pulmonary effort is normal. No respiratory distress.      Breath sounds: Normal breath sounds.   Abdominal:      Palpations: Abdomen is soft.   Musculoskeletal:         General: No swelling.      Cervical back: Neck supple.      Thoracic back: No spasms. Normal range of motion.      Lumbar back: No deformity or bony tenderness. Normal range of motion. Negative right straight leg raise test and negative left straight leg raise test.   Skin:     General: Skin is warm and dry.      Capillary Refill: Capillary refill takes less than 2 seconds.   Neurological:      Mental Status: She is alert.   Psychiatric:         Mood and Affect: Mood normal.         "

## 2024-11-06 NOTE — PROGRESS NOTES
"Daily Note     Today's date: 2024  Patient name: Adina Carvalho  : 1990  MRN: [id number]  Referring provider: Marily Blair, *  Dx:   Encounter Diagnosis     ICD-10-CM    1. Chronic bilateral low back pain with right-sided sciatica  M54.41     G89.29                      Subjective: Pt presents to PT reporting she had her blood drawn and an xray and states her pain is a 9/10 and describes it as stabbing.  Pt reports no change in pain post PT session.      Objective: See treatment diary below      Assessment: Pt demonstrates antalgic gait with short strides and slow movements.  Pt offered MHP and ice pack but deferred stating it did not help. Pt demonstrates difficulty with TE secondary pain in low back. Pt demonstrates better tolerance sitting of stand TE.  Pt demonstrated fatigue post treatment and would benefit from continued PT to increase flexibility, strength and function.      Plan: Continue per plan of care.      Precautions: Can't tolerate supine/prone, Moldovan speaking  Date 10/23 10/29 10/31 11/6         Visit # IE 2 3 4         FOTO IE             Re-eval IE                 Manuals 10/23 10/29 10/31 11/6         Erector STM                                                    Neuro Re-Ed 10/23 10/29 10/31 11/6         Nerve glides seated 3x10 20x ea           Seated TA  Standing TA red PB press 2x10  Standing TA red PB press 2x10  Standing TA red PB press 3\" x 15         Seated TA+march             3 way hip  20x ea abd and ext Abd/ext x20 ea Abd/ext x20 ea         Seated hip add iso   5\"x20 5\"x30                                   Ther Ex 10/23 10/29 10/31 11/6         Ball roll out  3x10 3x10\" Pn! held  Deferred pn!         Seated march 3x20 20x ea           Seated abduction 3x20 3x10  GTB 3x10 GTB 3x10 GTB         Leg press             Seated thoracic rotation             SKTC seated  held                                     Ther Activity 10/23 10/29 10/31 11/6       "   Sidestepping     3 laps         Standing marches    20x ea B         Gait Training                                       Modalities   10/31 11/6         TENS and MHP  10' pre MHP 5' with TE deferred

## 2024-11-06 NOTE — ASSESSMENT & PLAN NOTE
Orders:    methocarbamol (ROBAXIN) 500 mg tablet; Take 1 tablet (500 mg total) by mouth 2 (two) times a day as needed for muscle spasms    lidocaine (Lidoderm) 5 %; Apply 1 patch topically over 12 hours daily Remove & Discard patch within 12 hours or as directed by MD

## 2024-11-06 NOTE — PATIENT INSTRUCTIONS
"Patient Education     Ejercicios para la espalda   Conceptos Básicos   Redactado por los médicos y editores de [employer]   ¿Por qué necesito hacer ejercicios para la espalda? -- Los ejercicios para la espalda pueden ayudar a aliviar el dolor de espalda y podrían ayudar a prevenir indira de espalda futuros. A bandar plazo, es importante fortalecer los músculos de la parte baja de la espalda, las nalgas y el área del estómago. Estos son leoncio \"músculos troncales\". Los ejercicios de estiramiento también son importantes para mantener los músculos flexibles.  A continuación se muestran algunos ejercicios de estiramiento y fortalecimiento que podrían ayudarle. Otras formas de movimiento también pueden ayudar a aliviar o prevenir el dolor de espalda. Por ejemplo, a algunas personas les gusta caminar, hacer ejercicio aeróbico o hacer yoga o shay chi. Lo más importante es  el cuerpo. Smith médico, enfermero o fisioterapeuta puede ayudarle a encontrar diferentes tipos de actividad que le edy berna.  ¿Qué ejercicios de estiramiento lashonda hacer? -- A continuación se muestran algunos ejemplos de ejercicios de estiramiento. Josephine leoncio músculos antes de estirar para ayudar a prevenir lesiones. Para calentar, puede caminar, trotar o andar en bicicleta jeremy unos minutos.  Comience repitiendo cada nuno de estos estiramientos de 2 a 3 veces. Intente mantener cada estiramiento jeremy 5 a 10 segundos y trate de hacer los estiramientos 2 a 3 veces al día. Respire lenta y profundamente mientras hace los ejercicios. Nunca rebote al hacer estiramientos.   Estiramiento eric-thalia (figura 1) - Comience a cuatro patas en el suelo, con las reji debajo de los hombros, las rodillas debajo de las caderas y la espalda plana. Artie, meta la barbilla y contraiga los músculos del estómago mientras curva la espalda (olimpia un \"eric\"). Mantenga el estiramiento jeremy unos 10 segundos. Descanse unos segundos mientras aplana la espalda. Luego, levante " "la barbilla y deje que el vientre y la espalda baja se hundan hacia el suelo (olimpia radha \"thalia\"). Mantenga el estiramiento jeremy unos 10 segundos.   Estiramientos simples de rodilla a pecho (figura 2) ? Mientras se acuesta boca arriba, flexione las rodillas con los pies apoyados en el suelo. Tire de radha rodilla hacia el pecho hasta que sienta un estiramiento en la parte baja de la espalda y en el área de los glúteos. Baje y repita con la otra rodilla. Si tiene problemas de rodilla, levante la rodilla agarrando la parte posterior del muslo en lugar de la parte delantera de la rodilla. También puede hacer lilly ejercicio agarrando ambas rodillas al mismo tiempo.   Rotaciones del tronco inferior (figura 3) ? Mientras se acuesta boca arriba, flexione las rodillas con los pies apoyados en el suelo. Mantenga las rodillas y los tobillos juntos y luego déjelos caer hacia un lado. Mantenga ambos hombros tocando el suelo hasta que sienta un estiramiento en los músculos laterales de la espalda. Repita en el otro lado.   Estiramientos de la espalda baja sentado (figura 4) - Siéntese en radha silla con los pies separados aproximadamente al ancho de los hombros. Luego, inclínese hacia adelante hasta que sienta un estiramiento en la radha lumbar.  ¿Qué ejercicios de fortalecimiento lashonda hacer? -- A continuación se muestran algunos ejemplos de ejercicios de fortalecimiento.  Empiece por hacer cada ejercicio 2 o 3 veces. Trabaje hasta hacer cada ejercicio 10 veces. Mantenga cada ejercicio jeremy 3 a 5 segundos e intente realizarlos 2 a 3 veces al día. Glenna todos los ejercicios lentamente.   Apretones del omóplato (figura 5) - Junte los omóplatos en la parte superior de la espalda y manténgalo así jeremy 3 a 5 segundos. También puede hacer estos 1 lado a la vez. Siéntese con buena postura y asegúrese de que leoncio hombros no se levanten cuando glenna lilly ejercicio. Relaje.   Inclinaciones pélvicas (figura 6) ? Acuéstese boca arriba con las " "rodillas flexionadas y los pies apoyados en el suelo. Apriete los músculos del abdomen y presione la parte baja de la espalda hacia el suelo. Relaje. Debería poder respirar cómodamente jeremy lilly ejercicio.   Levantamientos de cadera (figura 7) ? Acuéstese boca arriba con las rodillas flexionadas y los pies apoyados en el suelo. Apriete los músculos del abdomen, mantenga la espalda plana y levante las nalgas del suelo. Relaje. Debería sentir esto en leoncio nalgas, no en ricketts espalda baja.  ¿Qué más lashonda saber?    El ejercicio, incluido el estiramiento, puede resultar un poco incómodo, daryl no debe sentir dolor sharona o intenso. Si siente un dolor intenso, deje de hacer lo que está haciendo. Si el dolor intenso continúa, llame a ricketts médico o enfermero.   No contenga la respiración cuando glenna ejercicio. Si tiende a contener la respiración, intente contar en voz nayana cuando glenna ejercicio. Si algún ejercicio le molesta, deténgase de inmediato.   Siempre entre en calor antes de hacer ejercicio. Los músculos calientes se estiran mucho más fácilmente que los músculos fríos. Estirar los músculos fríos puede provocar lesiones.   Hacer ejercicios antes de radha comida puede ser radha buena forma de adoptar radha rutina.  Todos los artículos se actualizan a medida que se descubre nueva evidencia y culmina nuestro proceso de evaluación por homólogos   Lilly artículo se recuperó de Up[employer]Date el: Apr 03, 2024.  Artículo [id number] Versión 2.0.es-419.1  Release: 32.2.4 - C32.92  © 2024 [employer] Inc. Todos los derechos reservados.  figura 1: Estiramiento eric-thalia     Comience a cuatro patas en el suelo, con las reji debajo de los hombros, las rodillas debajo de las caderas y la espalda plana. Artie, meta la barbilla y contraiga los músculos del estómago mientras curva la espalda (olimpia un \"eric\"). Mantenga el estiramiento jeremy unos 10 segundos. Descanse unos segundos mientras aplana la espalda. Luego, levante la barbilla y deje que el " "vientre y la espalda baja se hundan hacia el suelo (olimpia radha \"thalia\"). Mantenga el estiramiento jeremy unos 10 segundos.  Gráfico [id number] Versión 1.0  figura 2: Estiramiento simple de rodilla a pecho     Acuéstese boca arriba con las rodillas dobladas y apoye los pies en el suelo. Tire de radha rodilla hacia el pecho hasta que sienta un estiramiento en la parte baja de la espalda y en el área de los glúteos. Repita con la otra rodilla. Si tiene problemas de rodilla, levante la rodilla agarrando la parte posterior del muslo en lugar de la parte delantera de la rodilla. También puede hacer lilly ejercicio agarrando ambas rodillas al mismo tiempo.  Gráfico [id number] Versión 1.0  figura 3: Rotación del tronco inferior     Mientras se acuesta boca arriba, doble las rodillas y apoye los pies en el suelo. Mantenga las piernas juntas y luego déjelas caer hacia un lado. Mantenga ambos hombros tocando el suelo hasta que sienta un estiramiento en los músculos laterales de la espalda. Repita en el otro lado.  Gráfico [id number] Versión 1.0  figura 4: Estiramiento de la espalda baja     Siéntese en radha silla con los pies separados aproximadamente al ancho de los hombros. Luego, inclínese hacia adelante hasta que sienta un estiramiento en la radha lumbar.  Gráfico [id number] Versión 1.0  figura 5: Apretones del omóplato     Junte los omóplatos en la parte superior de la espalda y manténgalo así jeremy 3 a 5 segundos. Asegúrese de estar sentado con radha buena postura y de que leoncio hombros no se levanten cuando glenna lilly ejercicio. Relaje.  Gráfico [id number] Versión 1.0  figura 6: Inclinaciones pélvicas     Acuéstese boca arriba con las rodillas dobladas y los pies apoyados en el suelo. Apriete los músculos del estómago y presione la parte baja de la espalda hacia el suelo. Relaje.  Gráfico [id number] Versión 1.0  figura 7: Levantamientos de cadera     Acuéstese boca arriba con las rodillas dobladas y los pies apoyados en el suelo. Apriete los músculos " del estómago y levante las nalgas del suelo. Relaje.  Gráfico [id number] Versión 1.0  Exención de responsabilidad y uso de la información del consumidor   Descargo de responsabilidad: esta información generalizada es un resumen limitado de información sobre el diagnóstico, el tratamiento y/o los medicamentos. No pretende ser exhaustiva y se debe utilizar olimpia herramienta para ayudar al usuario a comprender y/o evaluar las posibles opciones de diagnóstico y tratamiento. No incluye toda la información sobre afecciones, tratamientos, medicamentos, efectos secundarios o riesgos puedan ser aplicables a un paciente específico. No tiene el propósito de servir olimpia recomendación médica ni de sustituir la recomendación médica, el diagnóstico o el tratamiento de un profesional de atención médica que se base en el examen y la evaluación de lilly profesional de la germán respecto a las circunstancias específicas y únicas del paciente. Los pacientes deben hablar con un profesional de atención médica para obtener información completa sobre ricketts germán, cuestiones médicas y opciones de tratamiento, incluidos los riesgos o los beneficios relacionados con el uso de medicamentos. Esta información no certifica que los tratamientos o medicamentos felix seguros, eficaces o estén aprobados para tratar a un paciente específico. [employer] Inc[employer] y leoncio afiliados renuncian a cualquier garantía o responsabilidad relacionada con esta información o el uso de la misma.El uso de esta información está sujeto a las Condiciones de uso, disponibles en https://www."[employer]uwer.com/en/know/clinical-effectiveness-terms. 2024© [employer] y leoncio afiliados y/o licenciantes. Todos los derechos reservados.  Copyright   © 2024 [employer]. Todos los derechos reservados.

## 2024-11-07 LAB
EST. AVERAGE GLUCOSE BLD GHB EST-MCNC: 111 MG/DL
HBA1C MFR BLD: 5.5 %

## 2024-11-08 ENCOUNTER — APPOINTMENT (OUTPATIENT)
Dept: PHYSICAL THERAPY | Facility: CLINIC | Age: 34
End: 2024-11-08
Payer: COMMERCIAL

## 2024-11-12 ENCOUNTER — OFFICE VISIT (OUTPATIENT)
Dept: PHYSICAL THERAPY | Facility: CLINIC | Age: 34
End: 2024-11-12
Payer: COMMERCIAL

## 2024-11-12 DIAGNOSIS — G89.29 CHRONIC BILATERAL LOW BACK PAIN WITH RIGHT-SIDED SCIATICA: Primary | ICD-10-CM

## 2024-11-12 DIAGNOSIS — M54.41 CHRONIC BILATERAL LOW BACK PAIN WITH RIGHT-SIDED SCIATICA: Primary | ICD-10-CM

## 2024-11-12 PROCEDURE — 97110 THERAPEUTIC EXERCISES: CPT

## 2024-11-12 PROCEDURE — 97112 NEUROMUSCULAR REEDUCATION: CPT

## 2024-11-12 NOTE — PROGRESS NOTES
"Daily Note     Today's date: 2024  Patient name: Adina Carvalho  : 1990  MRN: [id number]  Referring provider: Marily Blair, *  Dx:   Encounter Diagnosis     ICD-10-CM    1. Chronic bilateral low back pain with right-sided sciatica  M54.41     G89.29           Start Time: 1130  Stop Time: 1208  Total time in clinic (min): 38 minutes    Subjective: Pt presents to the clinic with reports of no change in pain. Pt recently had an x-ray that came back negative. Pt was prescribed pain meds by the doctor and she says it just makes her tired with no pain relief. Pt goes back to see the doctor in 2 weeks.      Objective: See treatment diary below      Assessment: Pt continues to tolerate any exercise in therapy. She is in pain all the time in the middle of the low back. Pt states the doctor gave her medication but it is not helping. PT saw she was prescribed lidocaine patches but the pt states she hasn't received them. Pt told to get incontact with her doctor's office to ask about this prescription. Will continue pain modulation and exercise to pt tolerance. Tolerated treatment well. Patient demonstrated fatigue post treatment and would benefit from continued PT      Plan: Continue per plan of care.      Precautions: Can't tolerate supine/prone, Indian speaking  Date 10/23 10/29 10/31 11/6 11/12        Visit # IE 2 3 4 5        FOTO IE     nv        Re-eval IE                 Manuals 10/23 10/29 10/31 11/6 11/12        Erector STM                                                    Neuro Re-Ed 10/23 10/29 10/31 11/6 11/12        Nerve glides seated 3x10 20x ea           Seated TA  Standing TA red PB press 2x10  Standing TA red PB press 2x10  Standing TA red PB press 3\" x 15 Standing TA red PB press 3\" x 15        Seated TA+march             3 way hip  20x ea abd and ext Abd/ext x20 ea Abd/ext x20 ea         Seated hip add iso   5\"x20 5\"x30 5\" x 30                                   Ther Ex 10/23 " "10/29 10/31 11/6 11/12        Ball roll out  3x10 3x10\" Pn! held  Deferred pn!         Seated marches 3x20 20x ea           Seated abduction 3x20 3x10  GTB 3x10 GTB 3x10 GTB 3x10 GTB        Leg press             Seated thoracic rotation             SKTC seated  held   NH        Pt education                          Ther Activity 10/23 10/29 10/31 11/6 11/12        Sidestepping     3 laps         Standing marches    20x ea B         Gait Training                                       Modalities   10/31 11/6         TENS and MHP  10' pre MHP 5' with TE deferred                                 "

## 2024-11-14 ENCOUNTER — APPOINTMENT (OUTPATIENT)
Dept: PHYSICAL THERAPY | Facility: CLINIC | Age: 34
End: 2024-11-14
Payer: COMMERCIAL

## 2024-11-19 ENCOUNTER — OFFICE VISIT (OUTPATIENT)
Dept: PHYSICAL THERAPY | Facility: CLINIC | Age: 34
End: 2024-11-19
Payer: COMMERCIAL

## 2024-11-19 DIAGNOSIS — G89.29 CHRONIC BILATERAL LOW BACK PAIN WITH RIGHT-SIDED SCIATICA: Primary | ICD-10-CM

## 2024-11-19 DIAGNOSIS — M54.41 CHRONIC BILATERAL LOW BACK PAIN WITH RIGHT-SIDED SCIATICA: Primary | ICD-10-CM

## 2024-11-19 PROCEDURE — 97530 THERAPEUTIC ACTIVITIES: CPT

## 2024-11-19 PROCEDURE — 97110 THERAPEUTIC EXERCISES: CPT

## 2024-11-19 NOTE — PROGRESS NOTES
"Daily Note     Today's date: 2024  Patient name: Adina Carvalho  : 1990  MRN: [id number]  Referring provider: Marily Blair, *  Dx:   Encounter Diagnosis     ICD-10-CM    1. Chronic bilateral low back pain with right-sided sciatica  M54.41     G89.29           Start Time: 1200  Stop Time: 1238  Total time in clinic (min): 38 minutes    Subjective: Pt presents with no improvements in pain. States she is wearing her patches but they aren't working. Pt states she will likely get in contact with her doctor to try and get in to the office sooner.      Objective: See treatment diary below      Assessment: Pt continues to struggle with all exercises prescribed. Pt is sensitive to all movement that puts stress on the low back. Pt can't tolerate any modalities or touch to the low back as well. PT would recommend further evaluation by orthopedist or pain management for relief. At this point, pt is making minimal improvements with no pain relief. Pt told to get in contact with doctor for further evaluation. Tolerated treatment well. Patient demonstrated fatigue post treatment and would benefit from continued PT      Plan: Continue per plan of care.      Precautions: Can't tolerate supine/prone, Chinese speaking  Date 10/23 10/29 10/31 11/6 11/12 11/19       Visit # IE 2 3 4 5 6       FOTO IE     nv        Re-eval IE                 Manuals 10/23 10/29 10/31 11/6 11/12 11/19       Erector STM                                                    Neuro Re-Ed 10/23 10/29 10/31 11/6 11/12 11/19       Nerve glides seated 3x10 20x ea           Seated TA  Standing TA red PB press 2x10  Standing TA red PB press 2x10  Standing TA red PB press 3\" x 15 Standing TA red PB press 3\" x 15        Seated TA+march             3 way hip  20x ea abd and ext Abd/ext x20 ea Abd/ext x20 ea         Seated hip add iso   5\"x20 5\"x30 5\" x 30                                   Ther Ex 10/23 10/29 10/31 11/6 11/12 11/19       Ball " "roll out  3x10 3x10\" Pn! held  Deferred pn!  2x10 p!       Seated marches 3x20 20x ea    GTB 2x20        Seated abduction 3x20 3x10  GTB 3x10 GTB 3x10 GTB 3x10 GTB 3x10 GTB       LAQ      2x10 b/l       Leg press             Seated thoracic rotation             Calf raises      2x20       SKTC seated  held   NH NH       Pt education                          Ther Activity 10/23 10/29 10/31 11/6 11/12 11/19       Sidestepping     3 laps  [id number] OTB       Standing marches    20x ea B  2x20 OTB       Gait Training                                       Modalities   10/31 11/6         TENS and MHP  10' pre MHP 5' with TE deferred                                   "

## 2024-11-27 ENCOUNTER — APPOINTMENT (OUTPATIENT)
Dept: PHYSICAL THERAPY | Facility: CLINIC | Age: 34
End: 2024-11-27
Payer: COMMERCIAL

## 2025-03-06 ENCOUNTER — TELEPHONE (OUTPATIENT)
Dept: OBGYN CLINIC | Facility: CLINIC | Age: 35
End: 2025-03-06

## (undated) DEVICE — SUT MONOCRYL 4-0 PS-2 27 IN Y426H

## (undated) DEVICE — TELFA NON-ADHERENT ABSORBENT DRESSING: Brand: TELFA

## (undated) DEVICE — CHLORAPREP HI-LITE 26ML ORANGE

## (undated) DEVICE — GLOVE INDICATOR PI UNDERGLOVE SZ 7 BLUE

## (undated) DEVICE — GLOVE SRG BIOGEL ECLIPSE 7

## (undated) DEVICE — SUT VICRYL 0 CT 36 IN J958H

## (undated) DEVICE — KIT,BETHLEHEM C SECT DELIVERY: Brand: CARDINAL HEALTH

## (undated) DEVICE — SUT VICRYL 0 CTX 36 IN J978H

## (undated) DEVICE — ADHESIVE SKIN HIGH VISCOSITY EXOFIN 1ML

## (undated) DEVICE — SCD SEQUENTIAL COMPRESSION COMFORT SLEEVE MEDIUM KNEE LENGTH: Brand: KENDALL SCD

## (undated) DEVICE — SUT VICRYL 0 CT-1 36 IN J946H

## (undated) DEVICE — ABG MICROSTICKS SAFETY

## (undated) DEVICE — PACK C-SECTION PBDS

## (undated) DEVICE — SUT PLAIN 3-0 CT-1 27 IN 842H

## (undated) DEVICE — GLOVE INDICATOR PI UNDERGLOVE SZ 7.5 BLUE